# Patient Record
Sex: MALE | Race: WHITE | Employment: OTHER | URBAN - METROPOLITAN AREA
[De-identification: names, ages, dates, MRNs, and addresses within clinical notes are randomized per-mention and may not be internally consistent; named-entity substitution may affect disease eponyms.]

---

## 2017-02-10 PROBLEM — E23.6 PITUITARY MASS (HCC): Status: ACTIVE | Noted: 2017-02-10

## 2017-02-10 PROBLEM — I47.20 VENTRICULAR TACHYCARDIA (HCC): Status: ACTIVE | Noted: 2017-02-10

## 2017-02-10 PROBLEM — R51.9 GENERALIZED HEADACHES: Status: ACTIVE | Noted: 2017-02-10

## 2017-02-10 PROBLEM — R26.0 ATAXIC GAIT: Status: ACTIVE | Noted: 2017-02-10

## 2017-02-10 PROBLEM — I10 ACCELERATED HYPERTENSION: Status: ACTIVE | Noted: 2017-02-10

## 2017-02-10 PROBLEM — R00.0 WIDE-COMPLEX TACHYCARDIA: Status: ACTIVE | Noted: 2017-02-10

## 2017-02-10 PROBLEM — E22.0 ACROMEGALY (HCC): Status: ACTIVE | Noted: 2017-02-10

## 2017-02-10 PROBLEM — R55 SYNCOPE AND COLLAPSE: Status: ACTIVE | Noted: 2017-02-10

## 2017-02-10 PROBLEM — E23.6 PITUITARY MASS (HCC): Chronic | Status: ACTIVE | Noted: 2017-02-10

## 2017-03-24 ENCOUNTER — OFFICE VISIT (OUTPATIENT)
Dept: FAMILY MEDICINE CLINIC | Age: 77
End: 2017-03-24

## 2017-03-24 VITALS
HEART RATE: 69 BPM | DIASTOLIC BLOOD PRESSURE: 62 MMHG | WEIGHT: 285.6 LBS | RESPIRATION RATE: 14 BRPM | BODY MASS INDEX: 42.3 KG/M2 | OXYGEN SATURATION: 98 % | SYSTOLIC BLOOD PRESSURE: 116 MMHG | HEIGHT: 69 IN | TEMPERATURE: 98 F

## 2017-03-24 DIAGNOSIS — E78.2 MIXED HYPERLIPIDEMIA: ICD-10-CM

## 2017-03-24 DIAGNOSIS — I10 ESSENTIAL HYPERTENSION: Primary | ICD-10-CM

## 2017-03-24 DIAGNOSIS — E23.6 PITUITARY MASS (HCC): Chronic | ICD-10-CM

## 2017-03-24 DIAGNOSIS — Z51.81 MEDICATION MONITORING ENCOUNTER: ICD-10-CM

## 2017-03-24 DIAGNOSIS — J20.9 ACUTE TRACHEOBRONCHITIS: ICD-10-CM

## 2017-03-24 DIAGNOSIS — I47.20 VENTRICULAR TACHYARRHYTHMIA: ICD-10-CM

## 2017-03-24 DIAGNOSIS — I25.83 CORONARY ARTERY DISEASE DUE TO LIPID RICH PLAQUE: ICD-10-CM

## 2017-03-24 DIAGNOSIS — E22.0 ACROMEGALY (HCC): ICD-10-CM

## 2017-03-24 DIAGNOSIS — I25.10 CORONARY ARTERY DISEASE DUE TO LIPID RICH PLAQUE: ICD-10-CM

## 2017-03-24 DIAGNOSIS — E66.01 MORBID OBESITY WITH BMI OF 40.0-44.9, ADULT (HCC): ICD-10-CM

## 2017-03-24 DIAGNOSIS — D35.2 PITUITARY MACROADENOMA (HCC): ICD-10-CM

## 2017-03-24 PROCEDURE — G8510 SCR DEP NEG, NO PLAN REQD: HCPCS | Performed by: FAMILY MEDICINE

## 2017-03-24 PROCEDURE — 99214 OFFICE O/P EST MOD 30 MIN: CPT | Performed by: FAMILY MEDICINE

## 2017-03-24 PROCEDURE — 3288F FALL RISK ASSESSMENT DOCD: CPT | Performed by: FAMILY MEDICINE

## 2017-03-24 RX ORDER — LISINOPRIL 20 MG/1
TABLET ORAL
Qty: 180 TABLET | Refills: 3 | Status: SHIPPED | OUTPATIENT
Start: 2017-03-24 | End: 2017-04-12 | Stop reason: SDUPTHER

## 2017-03-24 RX ORDER — METHYLPREDNISOLONE 4 MG/1
TABLET ORAL
Qty: 1 KIT | Refills: 1 | Status: SHIPPED | OUTPATIENT
Start: 2017-03-24 | End: 2017-03-30

## 2017-03-24 RX ORDER — BENZONATATE 200 MG/1
200 CAPSULE ORAL 3 TIMES DAILY PRN
Qty: 30 CAPSULE | Refills: 3 | Status: SHIPPED | OUTPATIENT
Start: 2017-03-24 | End: 2018-06-04 | Stop reason: ALTCHOICE

## 2017-03-24 RX ORDER — METOPROLOL TARTRATE 50 MG/1
TABLET, FILM COATED ORAL
Qty: 180 TABLET | Refills: 3 | Status: SHIPPED | OUTPATIENT
Start: 2017-03-24 | End: 2017-04-12 | Stop reason: SDUPTHER

## 2017-03-24 RX ORDER — GUAIFENESIN 400 MG/1
400 TABLET ORAL 4 TIMES DAILY PRN
COMMUNITY
End: 2018-06-04 | Stop reason: ALTCHOICE

## 2017-03-24 ASSESSMENT — ENCOUNTER SYMPTOMS
COUGH: 1
GASTROINTESTINAL NEGATIVE: 1
CHOKING: 0
ALLERGIC/IMMUNOLOGIC NEGATIVE: 1
EYES NEGATIVE: 1
WHEEZING: 0
RHINORRHEA: 1
SHORTNESS OF BREATH: 0

## 2017-03-24 ASSESSMENT — PATIENT HEALTH QUESTIONNAIRE - PHQ9
SUM OF ALL RESPONSES TO PHQ9 QUESTIONS 1 & 2: 0
SUM OF ALL RESPONSES TO PHQ QUESTIONS 1-9: 0
1. LITTLE INTEREST OR PLEASURE IN DOING THINGS: 0
2. FEELING DOWN, DEPRESSED OR HOPELESS: 0

## 2017-03-29 LAB
CHOLESTEROL, TOTAL: 198 MG/DL
CHOLESTEROL/HDL RATIO: NORMAL
HDLC SERPL-MCNC: 45 MG/DL (ref 35–70)
LDL CHOLESTEROL CALCULATED: 124 MG/DL (ref 0–160)
TRIGL SERPL-MCNC: 143 MG/DL
VLDLC SERPL CALC-MCNC: 29 MG/DL

## 2017-04-12 DIAGNOSIS — I10 ESSENTIAL HYPERTENSION: ICD-10-CM

## 2017-04-13 RX ORDER — LISINOPRIL 20 MG/1
TABLET ORAL
Qty: 180 TABLET | Refills: 3 | Status: SHIPPED | OUTPATIENT
Start: 2017-04-13 | End: 2018-01-26 | Stop reason: SDUPTHER

## 2017-04-13 RX ORDER — METOPROLOL TARTRATE 50 MG/1
TABLET, FILM COATED ORAL
Qty: 180 TABLET | Refills: 3 | Status: SHIPPED | OUTPATIENT
Start: 2017-04-13 | End: 2018-01-26 | Stop reason: SDUPTHER

## 2018-01-26 DIAGNOSIS — I10 ESSENTIAL HYPERTENSION: ICD-10-CM

## 2018-01-29 RX ORDER — LISINOPRIL 20 MG/1
TABLET ORAL
Qty: 180 TABLET | Refills: 3 | Status: SHIPPED | OUTPATIENT
Start: 2018-01-29 | End: 2018-12-03 | Stop reason: SDUPTHER

## 2018-01-29 RX ORDER — METOPROLOL TARTRATE 50 MG/1
TABLET, FILM COATED ORAL
Qty: 180 TABLET | Refills: 3 | Status: SHIPPED | OUTPATIENT
Start: 2018-01-29 | End: 2018-12-03 | Stop reason: SDUPTHER

## 2018-02-19 ENCOUNTER — TELEPHONE (OUTPATIENT)
Dept: FAMILY MEDICINE CLINIC | Age: 78
End: 2018-02-19

## 2018-02-19 RX ORDER — OSELTAMIVIR PHOSPHATE 75 MG/1
75 CAPSULE ORAL 2 TIMES DAILY
Qty: 10 CAPSULE | Refills: 0 | Status: SHIPPED | OUTPATIENT
Start: 2018-02-19 | End: 2018-02-24

## 2018-06-04 ENCOUNTER — OFFICE VISIT (OUTPATIENT)
Dept: FAMILY MEDICINE CLINIC | Age: 78
End: 2018-06-04
Payer: MEDICARE

## 2018-06-04 VITALS
WEIGHT: 289.4 LBS | SYSTOLIC BLOOD PRESSURE: 122 MMHG | HEART RATE: 76 BPM | HEIGHT: 70 IN | BODY MASS INDEX: 41.43 KG/M2 | DIASTOLIC BLOOD PRESSURE: 76 MMHG | RESPIRATION RATE: 16 BRPM

## 2018-06-04 DIAGNOSIS — Z00.00 ROUTINE GENERAL MEDICAL EXAMINATION AT A HEALTH CARE FACILITY: Primary | ICD-10-CM

## 2018-06-04 PROCEDURE — G0438 PPPS, INITIAL VISIT: HCPCS | Performed by: FAMILY MEDICINE

## 2018-06-04 PROCEDURE — 4040F PNEUMOC VAC/ADMIN/RCVD: CPT | Performed by: FAMILY MEDICINE

## 2018-06-04 PROCEDURE — G8598 ASA/ANTIPLAT THER USED: HCPCS | Performed by: FAMILY MEDICINE

## 2018-06-04 ASSESSMENT — LIFESTYLE VARIABLES: HOW OFTEN DO YOU HAVE A DRINK CONTAINING ALCOHOL: 0

## 2018-06-04 ASSESSMENT — ANXIETY QUESTIONNAIRES: GAD7 TOTAL SCORE: 0

## 2018-06-04 ASSESSMENT — PATIENT HEALTH QUESTIONNAIRE - PHQ9: SUM OF ALL RESPONSES TO PHQ QUESTIONS 1-9: 0

## 2018-06-05 ENCOUNTER — OFFICE VISIT (OUTPATIENT)
Dept: FAMILY MEDICINE CLINIC | Age: 78
End: 2018-06-05
Payer: MEDICARE

## 2018-06-05 VITALS
BODY MASS INDEX: 41.37 KG/M2 | HEIGHT: 70 IN | OXYGEN SATURATION: 96 % | RESPIRATION RATE: 14 BRPM | HEART RATE: 64 BPM | SYSTOLIC BLOOD PRESSURE: 110 MMHG | DIASTOLIC BLOOD PRESSURE: 76 MMHG | WEIGHT: 289 LBS

## 2018-06-05 DIAGNOSIS — N50.812 TESTICULAR PAIN, LEFT: Primary | ICD-10-CM

## 2018-06-05 PROCEDURE — G8598 ASA/ANTIPLAT THER USED: HCPCS | Performed by: FAMILY MEDICINE

## 2018-06-05 PROCEDURE — G8427 DOCREV CUR MEDS BY ELIG CLIN: HCPCS | Performed by: FAMILY MEDICINE

## 2018-06-05 PROCEDURE — 4040F PNEUMOC VAC/ADMIN/RCVD: CPT | Performed by: FAMILY MEDICINE

## 2018-06-05 PROCEDURE — G8417 CALC BMI ABV UP PARAM F/U: HCPCS | Performed by: FAMILY MEDICINE

## 2018-06-05 PROCEDURE — 99213 OFFICE O/P EST LOW 20 MIN: CPT | Performed by: FAMILY MEDICINE

## 2018-06-05 PROCEDURE — 1123F ACP DISCUSS/DSCN MKR DOCD: CPT | Performed by: FAMILY MEDICINE

## 2018-06-05 PROCEDURE — 1036F TOBACCO NON-USER: CPT | Performed by: FAMILY MEDICINE

## 2018-12-03 DIAGNOSIS — I10 ESSENTIAL HYPERTENSION: ICD-10-CM

## 2018-12-04 RX ORDER — METOPROLOL TARTRATE 50 MG/1
TABLET, FILM COATED ORAL
Qty: 180 TABLET | Refills: 3 | Status: SHIPPED | OUTPATIENT
Start: 2018-12-04 | End: 2019-08-13 | Stop reason: SDUPTHER

## 2018-12-04 RX ORDER — LISINOPRIL 20 MG/1
TABLET ORAL
Qty: 180 TABLET | Refills: 3 | Status: SHIPPED | OUTPATIENT
Start: 2018-12-04 | End: 2019-08-13 | Stop reason: SDUPTHER

## 2019-02-07 ENCOUNTER — OFFICE VISIT (OUTPATIENT)
Dept: FAMILY MEDICINE CLINIC | Age: 79
End: 2019-02-07
Payer: MEDICARE

## 2019-02-07 VITALS
HEIGHT: 69 IN | BODY MASS INDEX: 43.22 KG/M2 | DIASTOLIC BLOOD PRESSURE: 84 MMHG | SYSTOLIC BLOOD PRESSURE: 136 MMHG | RESPIRATION RATE: 16 BRPM | HEART RATE: 64 BPM | WEIGHT: 291.8 LBS

## 2019-02-07 DIAGNOSIS — E22.0 ACROMEGALY (HCC): ICD-10-CM

## 2019-02-07 DIAGNOSIS — R42 EPISODIC LIGHTHEADEDNESS: ICD-10-CM

## 2019-02-07 DIAGNOSIS — I25.10 CORONARY ARTERY DISEASE DUE TO LIPID RICH PLAQUE: ICD-10-CM

## 2019-02-07 DIAGNOSIS — I25.83 CORONARY ARTERY DISEASE DUE TO LIPID RICH PLAQUE: ICD-10-CM

## 2019-02-07 DIAGNOSIS — I10 ESSENTIAL HYPERTENSION: Primary | ICD-10-CM

## 2019-02-07 DIAGNOSIS — M54.2 CERVICAL MUSCLE PAIN: ICD-10-CM

## 2019-02-07 DIAGNOSIS — E78.2 MIXED HYPERLIPIDEMIA: ICD-10-CM

## 2019-02-07 PROCEDURE — 99214 OFFICE O/P EST MOD 30 MIN: CPT | Performed by: FAMILY MEDICINE

## 2019-02-07 PROCEDURE — G8427 DOCREV CUR MEDS BY ELIG CLIN: HCPCS | Performed by: FAMILY MEDICINE

## 2019-02-07 PROCEDURE — G8598 ASA/ANTIPLAT THER USED: HCPCS | Performed by: FAMILY MEDICINE

## 2019-02-07 PROCEDURE — 1101F PT FALLS ASSESS-DOCD LE1/YR: CPT | Performed by: FAMILY MEDICINE

## 2019-02-07 PROCEDURE — 1036F TOBACCO NON-USER: CPT | Performed by: FAMILY MEDICINE

## 2019-02-07 PROCEDURE — 4040F PNEUMOC VAC/ADMIN/RCVD: CPT | Performed by: FAMILY MEDICINE

## 2019-02-07 PROCEDURE — G8417 CALC BMI ABV UP PARAM F/U: HCPCS | Performed by: FAMILY MEDICINE

## 2019-02-07 PROCEDURE — G8484 FLU IMMUNIZE NO ADMIN: HCPCS | Performed by: FAMILY MEDICINE

## 2019-02-07 PROCEDURE — 1123F ACP DISCUSS/DSCN MKR DOCD: CPT | Performed by: FAMILY MEDICINE

## 2019-02-07 ASSESSMENT — PATIENT HEALTH QUESTIONNAIRE - PHQ9
SUM OF ALL RESPONSES TO PHQ9 QUESTIONS 1 & 2: 0
2. FEELING DOWN, DEPRESSED OR HOPELESS: 0
1. LITTLE INTEREST OR PLEASURE IN DOING THINGS: 0
SUM OF ALL RESPONSES TO PHQ QUESTIONS 1-9: 0
SUM OF ALL RESPONSES TO PHQ QUESTIONS 1-9: 0

## 2019-02-07 ASSESSMENT — ENCOUNTER SYMPTOMS
ABDOMINAL PAIN: 0
RESPIRATORY NEGATIVE: 1
ALLERGIC/IMMUNOLOGIC NEGATIVE: 1
COUGH: 0
SHORTNESS OF BREATH: 0
NAUSEA: 0
GASTROINTESTINAL NEGATIVE: 1
PHOTOPHOBIA: 0

## 2019-08-13 ENCOUNTER — OFFICE VISIT (OUTPATIENT)
Dept: FAMILY MEDICINE CLINIC | Age: 79
End: 2019-08-13
Payer: MEDICARE

## 2019-08-13 VITALS
RESPIRATION RATE: 20 BRPM | DIASTOLIC BLOOD PRESSURE: 78 MMHG | HEART RATE: 64 BPM | WEIGHT: 291.6 LBS | BODY MASS INDEX: 41.75 KG/M2 | HEIGHT: 70 IN | SYSTOLIC BLOOD PRESSURE: 134 MMHG

## 2019-08-13 DIAGNOSIS — Z00.00 ROUTINE GENERAL MEDICAL EXAMINATION AT A HEALTH CARE FACILITY: Primary | ICD-10-CM

## 2019-08-13 DIAGNOSIS — I10 ESSENTIAL HYPERTENSION: ICD-10-CM

## 2019-08-13 PROCEDURE — 4040F PNEUMOC VAC/ADMIN/RCVD: CPT | Performed by: FAMILY MEDICINE

## 2019-08-13 PROCEDURE — 1123F ACP DISCUSS/DSCN MKR DOCD: CPT | Performed by: FAMILY MEDICINE

## 2019-08-13 PROCEDURE — G0439 PPPS, SUBSEQ VISIT: HCPCS | Performed by: FAMILY MEDICINE

## 2019-08-13 PROCEDURE — G8598 ASA/ANTIPLAT THER USED: HCPCS | Performed by: FAMILY MEDICINE

## 2019-08-13 RX ORDER — METOPROLOL TARTRATE 50 MG/1
50 TABLET, FILM COATED ORAL 2 TIMES DAILY
Qty: 180 TABLET | Refills: 3 | Status: SHIPPED | OUTPATIENT
Start: 2019-08-13 | End: 2020-02-13 | Stop reason: SDUPTHER

## 2019-08-13 RX ORDER — LISINOPRIL 20 MG/1
20 TABLET ORAL 2 TIMES DAILY
Qty: 180 TABLET | Refills: 3 | Status: SHIPPED | OUTPATIENT
Start: 2019-08-13 | End: 2020-02-13 | Stop reason: SDUPTHER

## 2019-08-13 RX ORDER — IBUPROFEN 200 MG
200 TABLET ORAL EVERY 6 HOURS PRN
Status: ON HOLD | COMMUNITY
End: 2021-10-19 | Stop reason: HOSPADM

## 2019-08-13 ASSESSMENT — PATIENT HEALTH QUESTIONNAIRE - PHQ9
SUM OF ALL RESPONSES TO PHQ QUESTIONS 1-9: 0
SUM OF ALL RESPONSES TO PHQ QUESTIONS 1-9: 0

## 2019-08-13 ASSESSMENT — LIFESTYLE VARIABLES: HOW OFTEN DO YOU HAVE A DRINK CONTAINING ALCOHOL: 0

## 2019-08-13 NOTE — PROGRESS NOTES
History of blood transfusion     Hyperlipidemia 2005    Hypertension 2008    Other disorders of kidney and ureter in diseases classified elsewhere     Pituitary macroadenoma ([facility] [address] 2009    Zoster 2009     Past Surgical History:   Procedure Laterality Date    ABDOMEN SURGERY  unsure    CARDIAC CATHETERIZATION      CHOLECYSTECTOMY      COLONOSCOPY      DENTAL SURGERY      at age 23 yr    HERNIA REPAIR  2003    right inguinal    TONSILLECTOMY  1946    TONSILLECTOMY AND ADENOIDECTOMY      TOOTH EXTRACTION  unsure     Family History   Problem Relation Age of Onset    Heart Disease Father     Hearing Loss Father     High Blood Pressure Father     Arthritis Father     Arthritis Mother     Cancer Mother     High Blood Pressure Mother     Vision Loss Mother     High Blood Pressure Brother     Other Brother         bilateral knee repacement    Stroke Brother        CareTeam (Including outside providers/suppliers regularly involved in providing care):   Patient Care Team:  Suzy Ontiveros MD as PCP - General (Family Medicine)  Suzy Ontiveros MD as PCP - [facility] Empaneled Provider    Wt Readings from Last 3 Encounters:   08/13/19 291 lb 9.6 oz (132.3 kg)   02/07/19 291 lb 12.8 oz (132.4 kg)   06/05/18 289 lb (131.1 kg)     Vitals:    08/13/19 1446   BP: 134/78   Site: Right Upper Arm   Position: Sitting   Cuff Size: Large Adult   Pulse: 64   Resp: 20   Weight: 291 lb 9.6 oz (132.3 kg)   Height: 5' 9.75\" (1.772 m)     Body mass index is 42.14 kg/m². Based upon direct observation of the patient, evaluation of cognition reveals recent and remote memory intact.     General Appearance: alert and oriented to person, place and time, well developed and well- nourished, in no acute distress  Skin: warm and dry, no rash or erythema  Head: normocephalic and atraumatic  ENT: tympanic membrane, external ear and ear canal normal bilaterally, nose without deformity, nasal mucosa and turbinates normal without polyps  Neck:

## 2019-08-13 NOTE — PATIENT INSTRUCTIONS
swimming. · Always wear a seat belt when traveling in a car. Always wear a helmet when riding a bicycle or motorcycle.

## 2020-02-13 ENCOUNTER — OFFICE VISIT (OUTPATIENT)
Dept: FAMILY MEDICINE CLINIC | Age: 80
End: 2020-02-13
Payer: MEDICARE

## 2020-02-13 VITALS
SYSTOLIC BLOOD PRESSURE: 122 MMHG | DIASTOLIC BLOOD PRESSURE: 74 MMHG | BODY MASS INDEX: 41.58 KG/M2 | RESPIRATION RATE: 16 BRPM | HEART RATE: 76 BPM | WEIGHT: 287.7 LBS

## 2020-02-13 PROCEDURE — 99214 OFFICE O/P EST MOD 30 MIN: CPT | Performed by: FAMILY MEDICINE

## 2020-02-13 RX ORDER — METOPROLOL TARTRATE 50 MG/1
50 TABLET, FILM COATED ORAL 2 TIMES DAILY
Qty: 180 TABLET | Refills: 3 | Status: SHIPPED | OUTPATIENT
Start: 2020-02-13 | End: 2020-11-12 | Stop reason: SDUPTHER

## 2020-02-13 RX ORDER — LISINOPRIL 20 MG/1
20 TABLET ORAL 2 TIMES DAILY
Qty: 180 TABLET | Refills: 3 | Status: SHIPPED | OUTPATIENT
Start: 2020-02-13 | End: 2020-11-12 | Stop reason: SDUPTHER

## 2020-02-13 RX ORDER — ERYTHROMYCIN 5 MG/G
OINTMENT OPHTHALMIC
Qty: 1 TUBE | Refills: 0 | Status: SHIPPED | OUTPATIENT
Start: 2020-02-13 | End: 2020-02-23

## 2020-02-13 SDOH — ECONOMIC STABILITY: INCOME INSECURITY: HOW HARD IS IT FOR YOU TO PAY FOR THE VERY BASICS LIKE FOOD, HOUSING, MEDICAL CARE, AND HEATING?: NOT HARD AT ALL

## 2020-02-13 SDOH — ECONOMIC STABILITY: FOOD INSECURITY: WITHIN THE PAST 12 MONTHS, YOU WORRIED THAT YOUR FOOD WOULD RUN OUT BEFORE YOU GOT MONEY TO BUY MORE.: NEVER TRUE

## 2020-02-13 SDOH — ECONOMIC STABILITY: TRANSPORTATION INSECURITY
IN THE PAST 12 MONTHS, HAS THE LACK OF TRANSPORTATION KEPT YOU FROM MEDICAL APPOINTMENTS OR FROM GETTING MEDICATIONS?: NO

## 2020-02-13 SDOH — ECONOMIC STABILITY: TRANSPORTATION INSECURITY
IN THE PAST 12 MONTHS, HAS LACK OF TRANSPORTATION KEPT YOU FROM MEETINGS, WORK, OR FROM GETTING THINGS NEEDED FOR DAILY LIVING?: NO

## 2020-02-13 SDOH — ECONOMIC STABILITY: FOOD INSECURITY: WITHIN THE PAST 12 MONTHS, THE FOOD YOU BOUGHT JUST DIDN'T LAST AND YOU DIDN'T HAVE MONEY TO GET MORE.: NEVER TRUE

## 2020-02-13 ASSESSMENT — ENCOUNTER SYMPTOMS
BLOOD IN STOOL: 0
RESPIRATORY NEGATIVE: 1
EYE ITCHING: 1
NAUSEA: 0
EYE REDNESS: 1
DIARRHEA: 0
ABDOMINAL DISTENTION: 0
ALLERGIC/IMMUNOLOGIC NEGATIVE: 1
EYE PAIN: 0
GASTROINTESTINAL NEGATIVE: 1
ANAL BLEEDING: 0
CONSTIPATION: 0
RECTAL PAIN: 0
ABDOMINAL PAIN: 0
VOMITING: 0
EYE DISCHARGE: 0

## 2020-02-13 ASSESSMENT — PATIENT HEALTH QUESTIONNAIRE - PHQ9
SUM OF ALL RESPONSES TO PHQ QUESTIONS 1-9: 0
1. LITTLE INTEREST OR PLEASURE IN DOING THINGS: 0
SUM OF ALL RESPONSES TO PHQ9 QUESTIONS 1 & 2: 0
2. FEELING DOWN, DEPRESSED OR HOPELESS: 0
SUM OF ALL RESPONSES TO PHQ QUESTIONS 1-9: 0

## 2020-02-13 NOTE — PROGRESS NOTES
Subjective:      Patient ID: Teddy Raygoza is a [de-identified] y.o. male. HPI  Follow up of chronic conditions. Encounter Diagnoses   Name Primary? 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  Coronary artery disease due to lipid rich plaque     Pituitary macroadenoma, 2009     Routine general medical examination at a health care facility     Medication monitoring encounter     Urinary hesitancy     Hordeolum externum of right lower eyelid      HTN is stable with current medications. Pt has no medication side effects nor orthostatic symptoms. BP Readings from Last 3 Encounters:   02/13/20 122/74   08/13/19 134/78   02/07/19 136/84     Patient has had some mild urinary hesitation but feels like it is not severe enough to warrant any attention currently. His acromegaly secondary to his pituitary adenoma is unchanged. He is not actively following with endocrinology at this time although he has been evaluated at [facility] in the past.    His coronary artery disease remained stable. He denies having exertional chest pain or shortness of breath. He also has had no PND or nocturnal dyspnea. About a week and a half ago, a stye formed on his right lower eyelid and he started spraying the area with colloidal silver which helped decrease the swelling and tenderness. It has not totally resolved and is here for further evaluation also. The rest of this patient's conditions are stable. Past medical and surgical hx reviewed.   Past Medical History:   Diagnosis Date    Adenomatous colon polyp 2011    Blood transfusion reaction     Erectile dysfunction     History of blood transfusion     Hyperlipidemia 2005    Hypertension 2008    Other disorders of kidney and ureter in diseases classified elsewhere     Pituitary macroadenoma ([address] 2009    Zoster 2009     Past Surgical History:   Procedure Laterality Date    ABDOMEN SURGERY  unsure    CARDIAC CATHETERIZATION      CHOLECYSTECTOMY      COLONOSCOPY      DENTAL SURGERY      at age 23 yr   [address] [facility]  2003    right inguinal    TONSILLECTOMY  1946    TONSILLECTOMY AND ADENOIDECTOMY      TOOTH EXTRACTION  unsure     Portions of this note were completed with a voice recording program.  Efforts were made to edit the dictations but occasionally words are mis-transcribed. Review of Systems   Constitutional: Negative. HENT: Negative. Eyes: Positive for redness and itching. Negative for pain and discharge. Respiratory: Negative. Cardiovascular: Negative. Negative for palpitations and leg swelling. Gastrointestinal: Negative. Negative for abdominal distention, abdominal pain, anal bleeding, blood in stool, constipation, diarrhea, nausea, rectal pain and vomiting. Endocrine: Positive for polyuria. Negative for polydipsia and polyphagia. Genitourinary: Positive for difficulty urinating. Musculoskeletal: Positive for arthralgias. Skin: Negative. Allergic/Immunologic: Negative. Neurological: Negative. Hematological: Negative. Psychiatric/Behavioral: Negative. All other systems reviewed and are negative. Objective:   Physical Exam  Vitals signs and nursing note reviewed. Constitutional:       Appearance: He is obese. HENT:      Head: Normocephalic. Right Ear: Tympanic membrane, ear canal and external ear normal.      Left Ear: Tympanic membrane, ear canal and external ear normal.      Mouth/Throat:      Pharynx: Oropharynx is clear. Eyes:      Pupils: Pupils are equal, round, and reactive to light. Neck:      Musculoskeletal: No muscular tenderness. Vascular: No carotid bruit. Cardiovascular:      Rate and Rhythm: Normal rate and regular rhythm. Pulses: Normal pulses. Heart sounds: Normal heart sounds. Pulmonary:      Effort: Pulmonary effort is normal.      Breath sounds: Normal breath sounds.    Abdominal:      General: Bowel sounds are normal.      Palpations: There is no mass. Hernia: No hernia is present. Musculoskeletal:         General: No swelling or tenderness. Left lower leg: No edema. Lymphadenopathy:      Cervical: No cervical adenopathy. Skin:     General: Skin is warm and dry. Capillary Refill: Capillary refill takes less than 2 seconds. Neurological:      General: No focal deficit present. Mental Status: He is alert and oriented to person, place, and time. Psychiatric:         Mood and Affect: Mood normal.         Assessment:       Diagnosis Orders   1. Mixed hyperlipidemia  Lipid, Fasting   2. Essential hypertension  metoprolol tartrate (LOPRESSOR) 50 MG tablet    lisinopril (PRINIVIL;ZESTRIL) 20 MG tablet    CBC With Auto Differential    Comprehensive Metabolic Panel, Fasting    Lipid, Fasting    Urinalysis   3. 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 4. Coronary artery disease due to lipid rich plaque     5. Pituitary macroadenoma, 2009     6. Routine general medical examination at a health care facility     7. Medication monitoring encounter  CBC With Auto Differential    Comprehensive Metabolic Panel, Fasting    Lipid, Fasting    Urinalysis    PSA Prostatic Specific Antigen   8. Urinary hesitancy  Urinalysis    PSA Prostatic Specific Antigen   9.  Hordeolum externum of right lower eyelid  erythromycin (ROMYCIN) 5 MG/GM ophthalmic ointment           Plan:      Orders Placed This Encounter   Procedures    CBC With Auto Differential     Standing Status:   Future     Standing Expiration Date:   2/13/2021    Comprehensive Metabolic Panel, Fasting     Standing Status:   Future     Standing Expiration Date:   2/13/2021    Lipid, Fasting     Standing Status:   Future     Standing Expiration Date:   2/13/2021    Urinalysis     Standing Status:   Future     Standing Expiration Date:   2/13/2021   Kim Mendieta PSA Prostatic Specific Antigen     Standing Status:   Future     Standing Expiration Date: 2/13/2021     Medications Discontinued During This Encounter   Medication Reason    metoprolol tartrate (LOPRESSOR) 50 MG tablet REORDER    lisinopril (PRINIVIL;ZESTRIL) 20 MG tablet REORDER     Current Outpatient Medications   Medication Sig Dispense Refill    metoprolol tartrate (LOPRESSOR) 50 MG tablet Take 1 tablet by mouth 2 times daily 180 tablet 3    lisinopril (PRINIVIL;ZESTRIL) 20 MG tablet Take 1 tablet by mouth 2 times daily 180 tablet 3    erythromycin (ROMYCIN) 5 MG/GM ophthalmic ointment Apply qid to OD until clear. 1 Tube 0    Misc Natural Products (GLUCOSAMINE CHONDROITIN MSM PO) Take by mouth      ibuprofen (ADVIL;MOTRIN) 200 MG tablet Take 200 mg by mouth every 6 hours as needed for Pain      docusate sodium (COLACE) 100 MG capsule Take 100 mg by mouth every evening       multivitamin (THERAGRAN) per tablet Take 1 tablet by mouth daily       Lysine 500 MG CAPS Take 500 mg by mouth. 2 daily         Arginine 500 MG CAPS Take 1,000 mg by mouth daily       Olive Leaf 250 MG CAPS Take 250 mg by mouth Once or twice daily      B Complex Vitamins (VITAMIN B COMPLEX PO) Take  by mouth. Twice weekly        No current facility-administered medications for this visit. Continue presents medications. Continue present level of physical activity. Discussed use, benefit, and side effects of prescribed medications. Barriers to compliance discussed. All patient questions answered. Pt voiced understanding.           George Maldonado MD

## 2020-02-13 NOTE — PATIENT INSTRUCTIONS
You may receive a survey about your visit with us today. The feedback from our patients helps us identify what is working well and where the service to all patients can be enhanced. Thank you! Continue presents medications. Continue present level of physical activity. Patient Education        Styes and Chalazia: Care Instructions  Your Care Instructions    Styes and chalazia (say \"lat-PZM-ccv-anmol\") are both conditions that can cause swelling of the eyelid. A stye is an infection in the root of an eyelash. The infection causes a tender red lump on the edge of the eyelid. The infection can spread until the whole eyelid becomes red and inflamed. Styes usually break open, and a tiny amount of pus drains. They usually clear up on their own in about a week, but they sometimes need treatment with antibiotics. A chalazion is a lump or cyst in the eyelid (chalazion is singular; chalazia is plural). It is caused by swelling and inflammation of deep oil glands inside the eyelid. Chalazia are usually not infected. They can take a few months to heal.  If a chalazion becomes more swollen and painful or does not go away, you may need to have it drained by your doctor. Follow-up care is a key part of your treatment and safety. Be sure to make and go to all appointments, and call your doctor if you are having problems. It's also a good idea to know your test results and keep a list of the medicines you take. How can you care for yourself at home? · Do not rub your eyes. Do not squeeze or try to open a stye or chalazion. · To help a stye or chalazion heal faster:  ? Put a warm, moist compress on your eye for 5 to 10 minutes, 3 to 6 times a day. Heat often brings a stye to a point where it drains on its own. Keep in mind that warm compresses will often increase swelling a little at first.  ? Do not use hot water or heat a wet cloth in a microwave oven. The compress may get too hot and can burn the eyelid.   · Always wash your hands before and after you use a compress or touch your eyes. · If the doctor gave you antibiotic drops or ointment, use the medicine exactly as directed. Use the medicine for as long as instructed, even if your eye starts to feel better. · To put in eyedrops or ointment:  ? Tilt your head back, and pull your lower eyelid down with one finger. ? Drop or squirt the medicine inside the lower lid. ? Close your eye for 30 to 60 seconds to let the drops or ointment move around. ? Do not touch the ointment or dropper tip to your eyelashes or any other surface. · Do not wear eye makeup or contact lenses until the stye or chalazion heals. · Do not share towels, pillows, or washcloths while you have a stye. When should you call for help? Call your doctor now or seek immediate medical care if:    · You have pain in your eye.     · You have a change in vision or loss of vision.     · Redness and swelling get much worse.    Watch closely for changes in your health, and be sure to contact your doctor if:    · Your stye does not get better in 1 week.     · Your chalazion does not start to get better after several weeks. Where can you learn more? Go to https://invendo medical.[employer]. org and sign in to your [employer] account. Enter F573 in the [facility] box to learn more about \"Styes and Chalazia: Care Instructions. \"     If you do not have an account, please click on the \"Sign Up Now\" link. Current as of: May 5, 2019  Content Version: 12.3  © 0784-9668 Healthwise, Incorporated. Care instructions adapted under license by [facility] ([facility]). If you have questions about a medical condition or this instruction, always ask your healthcare professional. [place] [address] any warranty or liability for your use of this information.          Patient Education        Styes and Chalazia: Care Instructions  Your Care Instructions    Styes and chalazia (say \"utx-XBN-tmx-uh\") are both conditions that can cause swelling of the eyelid. A stye is an infection in the root of an eyelash. The infection causes a tender red lump on the edge of the eyelid. The infection can spread until the whole eyelid becomes red and inflamed. Styes usually break open, and a tiny amount of pus drains. They usually clear up on their own in about a week, but they sometimes need treatment with antibiotics. A chalazion is a lump or cyst in the eyelid (chalazion is singular; chalazia is plural). It is caused by swelling and inflammation of deep oil glands inside the eyelid. Chalazia are usually not infected. They can take a few months to heal.  If a chalazion becomes more swollen and painful or does not go away, you may need to have it drained by your doctor. Follow-up care is a key part of your treatment and safety. Be sure to make and go to all appointments, and call your doctor if you are having problems. It's also a good idea to know your test results and keep a list of the medicines you take. How can you care for yourself at home? · Do not rub your eyes. Do not squeeze or try to open a stye or chalazion. · To help a stye or chalazion heal faster:  ? Put a warm, moist compress on your eye for 5 to 10 minutes, 3 to 6 times a day. Heat often brings a stye to a point where it drains on its own. Keep in mind that warm compresses will often increase swelling a little at first.  ? Do not use hot water or heat a wet cloth in a microwave oven. The compress may get too hot and can burn the eyelid. · Always wash your hands before and after you use a compress or touch your eyes. · If the doctor gave you antibiotic drops or ointment, use the medicine exactly as directed. Use the medicine for as long as instructed, even if your eye starts to feel better. · To put in eyedrops or ointment:  ? Tilt your head back, and pull your lower eyelid down with one finger. ? Drop or squirt the medicine inside the lower lid.   ? Close your eye for 30 to 60 seconds to let the drops or ointment move around. ? Do not touch the ointment or dropper tip to your eyelashes or any other surface. · Do not wear eye makeup or contact lenses until the stye or chalazion heals. · Do not share towels, pillows, or washcloths while you have a stye. When should you call for help? Call your doctor now or seek immediate medical care if:    · You have pain in your eye.     · You have a change in vision or loss of vision.     · Redness and swelling get much worse.    Watch closely for changes in your health, and be sure to contact your doctor if:    · Your stye does not get better in 1 week.     · Your chalazion does not start to get better after several weeks. Where can you learn more? Go to https://chpepiceweb.[employer]. org and sign in to your [employer] account. Enter W964 in the [employer] box to learn more about \"Styes and Chalazia: Care Instructions. \"     If you do not have an account, please click on the \"Sign Up Now\" link. Current as of: May 5, 2019  Content Version: 12.3  © 0663-6695 Healthwise, Incorporated. Care instructions adapted under license by [facility] ([facility]). If you have questions about a medical condition or this instruction, always ask your healthcare professional. [address] any warranty or liability for your use of this information.

## 2020-02-19 LAB
CHOLESTEROL, TOTAL: 205 MG/DL
CHOLESTEROL/HDL RATIO: NORMAL
HDLC SERPL-MCNC: 40 MG/DL (ref 35–70)
LDL CHOLESTEROL CALCULATED: 123 MG/DL (ref 0–160)
TRIGL SERPL-MCNC: 210 MG/DL
VLDLC SERPL CALC-MCNC: 42 MG/DL

## 2020-02-19 NOTE — RESULT ENCOUNTER NOTE
The labs came back looking good but his PSA level was 7.9 which is elevated but still less than 10.0. This may be all age-related change but in order to repeat a PSA in 6 months is on the printer and this will tell us if it is a stable reading or if there is a change that needs to be investigated.

## 2020-08-13 ENCOUNTER — OFFICE VISIT (OUTPATIENT)
Dept: FAMILY MEDICINE CLINIC | Age: 80
End: 2020-08-13
Payer: MEDICARE

## 2020-08-13 VITALS
HEART RATE: 68 BPM | BODY MASS INDEX: 41.42 KG/M2 | TEMPERATURE: 96.8 F | WEIGHT: 286.6 LBS | SYSTOLIC BLOOD PRESSURE: 124 MMHG | RESPIRATION RATE: 16 BRPM | DIASTOLIC BLOOD PRESSURE: 72 MMHG

## 2020-08-13 PROCEDURE — 99214 OFFICE O/P EST MOD 30 MIN: CPT | Performed by: FAMILY MEDICINE

## 2020-08-13 ASSESSMENT — ENCOUNTER SYMPTOMS
ALLERGIC/IMMUNOLOGIC NEGATIVE: 1
GASTROINTESTINAL NEGATIVE: 1
RESPIRATORY NEGATIVE: 1
SHORTNESS OF BREATH: 0

## 2020-08-13 NOTE — PROGRESS NOTES
SURGERY      at age 23 yr   [address] [facility]  2003    right inguinal    TONSILLECTOMY  1946    TONSILLECTOMY AND ADENOIDECTOMY      TOOTH EXTRACTION  unsure     Portions of this note were completed with a voice recording program.  Efforts were made to edit the dictations but occasionally words are mis-transcribed. Review of Systems   Constitutional: Negative. HENT: Negative. Respiratory: Negative. Negative for shortness of breath. Cardiovascular: Negative. Negative for chest pain, palpitations and leg swelling. Gastrointestinal: Negative. Endocrine: Negative. Genitourinary: Negative. Musculoskeletal: Positive for arthralgias. Allergic/Immunologic: Negative. Neurological: Negative. Negative for dizziness, light-headedness and headaches. Hematological: Negative. Psychiatric/Behavioral: Negative. All other systems reviewed and are negative. Objective:   Physical Exam  Vitals signs and nursing note reviewed. Constitutional:       Appearance: He is obese. HENT:      Right Ear: Tympanic membrane, ear canal and external ear normal.      Left Ear: Tympanic membrane, ear canal and external ear normal.      Mouth/Throat:      Mouth: Mucous membranes are moist.      Pharynx: Oropharynx is clear. Eyes:      Conjunctiva/sclera: Conjunctivae normal.      Pupils: Pupils are equal, round, and reactive to light. Neck:      Vascular: No carotid bruit. Cardiovascular:      Rate and Rhythm: Normal rate and regular rhythm. Pulses: Normal pulses. Heart sounds: Normal heart sounds. Pulmonary:      Effort: Pulmonary effort is normal.      Breath sounds: Normal breath sounds. Abdominal:      General: Bowel sounds are normal.   Musculoskeletal:      Right lower leg: No edema. Left lower leg: No edema. Skin:     General: Skin is warm and dry. Neurological:      General: No focal deficit present.       Mental Status: He is alert and oriented to person, place, and

## 2020-11-03 PROBLEM — R42 DIZZINESS: Status: RESOLVED | Noted: 2020-11-03 | Resolved: 2020-11-03

## 2020-11-12 ENCOUNTER — OFFICE VISIT (OUTPATIENT)
Dept: FAMILY MEDICINE CLINIC | Age: 80
End: 2020-11-12
Payer: MEDICARE

## 2020-11-12 VITALS
WEIGHT: 287 LBS | BODY MASS INDEX: 41.09 KG/M2 | DIASTOLIC BLOOD PRESSURE: 78 MMHG | SYSTOLIC BLOOD PRESSURE: 134 MMHG | RESPIRATION RATE: 16 BRPM | HEIGHT: 70 IN | HEART RATE: 86 BPM

## 2020-11-12 PROCEDURE — G0439 PPPS, SUBSEQ VISIT: HCPCS | Performed by: FAMILY MEDICINE

## 2020-11-12 RX ORDER — METOPROLOL TARTRATE 50 MG/1
50 TABLET, FILM COATED ORAL 2 TIMES DAILY
Qty: 180 TABLET | Refills: 3 | Status: SHIPPED | OUTPATIENT
Start: 2020-11-12 | End: 2021-12-28 | Stop reason: SDUPTHER

## 2020-11-12 RX ORDER — LISINOPRIL 20 MG/1
20 TABLET ORAL 2 TIMES DAILY
Qty: 180 TABLET | Refills: 3 | Status: SHIPPED | OUTPATIENT
Start: 2020-11-12 | End: 2021-12-28 | Stop reason: SDUPTHER

## 2020-11-12 ASSESSMENT — PATIENT HEALTH QUESTIONNAIRE - PHQ9
1. LITTLE INTEREST OR PLEASURE IN DOING THINGS: 0
SUM OF ALL RESPONSES TO PHQ QUESTIONS 1-9: 0
2. FEELING DOWN, DEPRESSED OR HOPELESS: 0
SUM OF ALL RESPONSES TO PHQ9 QUESTIONS 1 & 2: 0

## 2020-11-12 ASSESSMENT — LIFESTYLE VARIABLES: HOW OFTEN DO YOU HAVE A DRINK CONTAINING ALCOHOL: 0

## 2020-11-12 NOTE — PROGRESS NOTES
 Hyperlipidemia 2005    Hypertension 2008    Other disorders of kidney and ureter in diseases classified elsewhere     Pituitary macroadenoma ([facility] [address] 2009    Zoster 2009       Past Surgical History:   Procedure Laterality Date    ABDOMEN SURGERY  unsure    CARDIAC CATHETERIZATION      CHOLECYSTECTOMY      COLONOSCOPY      DENTAL SURGERY      at age 23 yr    HERNIA REPAIR  2003    right inguinal    TONSILLECTOMY  1946    TONSILLECTOMY AND ADENOIDECTOMY      TOOTH EXTRACTION  unsure         Family History   Problem Relation Age of Onset    Heart Disease Father     Hearing Loss Father     High Blood Pressure Father     Arthritis Father     Arthritis Mother     Cancer Mother     High Blood Pressure Mother     Vision Loss Mother     High Blood Pressure Brother     Other Brother         bilateral knee repacement    Stroke Brother        CareTeam (Including outside providers/suppliers regularly involved in providing care):   Patient Care Team:  Jenaro Marte MD as PCP - General (Family Medicine)  Jenaro Marte MD as PCP - [facility] Empaneled Provider    Wt Readings from Last 3 Encounters:   11/12/20 287 lb (130.2 kg)   08/13/20 286 lb 9.6 oz (130 kg)   02/13/20 287 lb 11.2 oz (130.5 kg)     Vitals:    11/12/20 1416   BP: 134/78   Pulse: 86   Resp: 16   Weight: 287 lb (130.2 kg)   Height: 5' 10\" (1.778 m)     Body mass index is 41.18 kg/m². Based upon direct observation of the patient, evaluation of cognition reveals recent and remote memory intact.     General Appearance: alert and oriented to person, place and time, well developed and well- nourished, in no acute distress  Skin: warm and dry, no rash or erythema  Head: normocephalic and atraumatic  Eyes: pupils equal, round, and reactive to light, extraocular eye movements intact, conjunctivae normal  ENT: tympanic membrane, external ear and ear canal normal bilaterally, nose without deformity, nasal mucosa and turbinates normal without polyps  Neck: supple and non-tender without mass, no thyromegaly or thyroid nodules, no cervical lymphadenopathy  Pulmonary/Chest: clear to auscultation bilaterally- no wheezes, rales or rhonchi, normal air movement, no respiratory distress  Cardiovascular: normal rate, regular rhythm, normal S1 and S2, no murmurs, rubs, clicks, or gallops, distal pulses intact, no carotid bruits  Abdomen: soft, non-tender, non-distended, normal bowel sounds, no masses or organomegaly  Extremities: no cyanosis, clubbing or edema  Musculoskeletal: normal range of motion, no joint swelling, deformity or tenderness  Neurologic: reflexes normal and symmetric, no cranial nerve deficit, gait, coordination and speech normal    Patient's complete Health Risk Assessment and screening values have been reviewed and are found in Flowsheets. The following problems were reviewed today and where indicated follow up appointments were made and/or referrals ordered. Positive Risk Factor Screenings with Interventions:     Health Habits/Nutrition:  Health Habits/Nutrition  Do you exercise for at least 20 minutes 2-3 times per week?: (!) No  Have you lost any weight without trying in the past 3 months?: No  Do you eat fewer than 2 meals per day?: No  Have you seen a dentist within the past year?: (!) No(dentures)  Body mass index: (!) 41.18  Health Habits/Nutrition Interventions:  · none. Hearing/Vision:  No exam data present  Hearing/Vision  Do you or your family notice any trouble with your hearing?: (!) Yes  Do you have difficulty driving, watching TV, or doing any of your daily activities because of your eyesight?: No  Have you had an eye exam within the past year?: Yes  Hearing/Vision Interventions:  · none.     Safety:  Safety  Do you have working smoke detectors?: Yes  Have all throw rugs been removed or fastened?: (!) No  Do you have non-slip mats or surfaces in all bathtubs/showers?: (!) No  Do all of your stairways have a railing or banister?: Yes  Are your doorways, halls and stairs free of clutter?: Yes  Do you always fasten your seatbelt when you are in a car?: Yes  Safety Interventions:  · none. Personalized Preventive Plan   Current Health Maintenance Status  There is no immunization history for the selected administration types on file for this patient. Health Maintenance   Topic Date Due    DTaP/Tdap/Td vaccine (1 - Tdap) 02/23/1958    Shingles Vaccine (1 of 2) 02/23/1989    Pneumococcal 65+ years Vaccine (1 of 1 - PPSV23) 02/23/2004    Potassium monitoring  02/09/2018    Creatinine monitoring  02/09/2018    Annual Wellness Visit (AWV)  02/13/2020    Flu vaccine (1) 11/12/2021 (Originally 9/1/2020)    Hepatitis A vaccine  Aged Out    Hepatitis B vaccine  Aged Out    Hib vaccine  Aged Out    Meningococcal (ACWY) vaccine  Aged Out     Recommendations for [employer] Due: see orders and patient instructions/AVS.  . Recommended screening schedule for the next 5-10 years is provided to the patient in written form: see Patient Instructions/AVS.    Fidel Perez was seen today for medicare awv. Diagnoses and all orders for this visit:    Encounter for subsequent annual wellness visit (AWV) in Medicare patient    Routine general medical examination at a health care facility    Essential hypertension  -     lisinopril (PRINIVIL;ZESTRIL) 20 MG tablet; Take 1 tablet by mouth 2 times daily  -     metoprolol tartrate (LOPRESSOR) 50 MG tablet; Take 1 tablet by mouth 2 times daily            Gabo received counseling on the following healthy behaviors: nutrition and exercise    Patient given educational materials on Hypertension    I have instructed Fidel Perez to complete a self tracking handout on Blood Sugars  and instructed them to bring it with them to his next appointment. Discussed use, benefit, and side effects of prescribed medications. Barriers to medication compliance addressed. All patient questions answered. Pt voiced understanding.

## 2020-11-12 NOTE — PATIENT INSTRUCTIONS
Personalized Preventive Plan for Michel Hernandez - 11/12/2020  Medicare offers a range of preventive health benefits. Some of the tests and screenings are paid in full while other may be subject to a deductible, co-insurance, and/or copay. Some of these benefits include a comprehensive review of your medical history including lifestyle, illnesses that may run in your family, and various assessments and screenings as appropriate. After reviewing your medical record and screening and assessments performed today your provider may have ordered immunizations, labs, imaging, and/or referrals for you. A list of these orders (if applicable) as well as your Preventive Care list are included within your After Visit Summary for your review. Other Preventive Recommendations:    · A preventive eye exam performed by an eye specialist is recommended every 1-2 years to screen for glaucoma; cataracts, macular degeneration, and other eye disorders. · A preventive dental visit is recommended every 6 months. · Try to get at least 150 minutes of exercise per week or 10,000 steps per day on a pedometer . · Order or download the FREE \"Exercise & Physical Activity: Your Everyday Guide\" from [employer]. Call 5-739.549.4115 or search [employer] online. · You need 5257-1540 mg of calcium and 2380-2786 IU of vitamin D per day. It is possible to meet your calcium requirement with diet alone, but a vitamin D supplement is usually necessary to meet this goal.  · When exposed to the sun, use a sunscreen that protects against both UVA and UVB radiation with an SPF of 30 or greater. Reapply every 2 to 3 hours or after sweating, drying off with a towel, or swimming. · Always wear a seat belt when traveling in a car. Always wear a helmet when riding a bicycle or motorcycle.

## 2020-11-12 NOTE — PROGRESS NOTES
Chronic Disease Visit Information    BP Readings from Last 3 Encounters:   08/13/20 124/72   02/13/20 122/74   08/13/19 134/78          LDL Calculated (mg/dL)   Date Value   02/19/2020 123     HDL (mg/dL)   Date Value   02/19/2020 40     BUN (mg/dl)   Date Value   02/09/2017 15     CREATININE (mg/dl)   Date Value   02/09/2017 0.8     Glucose (mg/dl)   Date Value   02/09/2017 93            Have you changed or started any medications since your last visit including any over-the-counter medicines, vitamins, or herbal medicines? no   Are you having any side effects from any of your medications? -  no  Have you stopped taking any of your medications? Is so, why? -  no    Have you seen any other physician or provider since your last visit? No  Have you had any other diagnostic tests since your last visit? No  Have you been seen in the emergency room and/or had an admission to a hospital since we last saw you? No  Have you had your annual diabetic retinal (eye) exam? Yes - Records Requested  Have you had your routine dental cleaning in the past 6 months? no    Have you activated your [employer] account? If not, what are your barriers?  No:      Patient Care Team:  Lara Tovar MD as PCP - General (Family Medicine)  Lara Tovar MD [facility] PCP - [facility]         Medical History Review  Past Medical, Family, and Social History reviewed and does contribute to the patient presenting condition    Health Maintenance   Topic Date Due    DTaP/Tdap/Td vaccine (1 - Tdap) 02/23/1958    Shingles Vaccine (1 of 2) 02/23/1989    Pneumococcal 65+ years Vaccine (1 of 1 - PPSV23) 02/23/2004    Potassium monitoring  02/09/2018    Creatinine monitoring  02/09/2018    Annual Wellness Visit (AWV)  02/13/2020    Flu vaccine (1) 11/12/2021 (Originally 9/1/2020)    Hepatitis A vaccine  Aged Out    Hepatitis B vaccine  Aged Out    Hib vaccine  Aged Out    Meningococcal (ACWY) vaccine  Aged Out

## 2021-05-18 ENCOUNTER — OFFICE VISIT (OUTPATIENT)
Dept: FAMILY MEDICINE CLINIC | Age: 81
End: 2021-05-18
Payer: MEDICARE

## 2021-05-18 VITALS
BODY MASS INDEX: 40.95 KG/M2 | WEIGHT: 285.4 LBS | DIASTOLIC BLOOD PRESSURE: 78 MMHG | SYSTOLIC BLOOD PRESSURE: 118 MMHG | HEART RATE: 60 BPM | RESPIRATION RATE: 20 BRPM

## 2021-05-18 DIAGNOSIS — R00.0 WIDE-COMPLEX TACHYCARDIA: ICD-10-CM

## 2021-05-18 DIAGNOSIS — E22.0 ACROMEGALY (HCC): ICD-10-CM

## 2021-05-18 DIAGNOSIS — E78.2 MIXED HYPERLIPIDEMIA: ICD-10-CM

## 2021-05-18 DIAGNOSIS — R73.01 IFG (IMPAIRED FASTING GLUCOSE): ICD-10-CM

## 2021-05-18 DIAGNOSIS — I25.83 CORONARY ARTERY DISEASE DUE TO LIPID RICH PLAQUE: ICD-10-CM

## 2021-05-18 DIAGNOSIS — D35.2 PITUITARY MACROADENOMA (HCC): ICD-10-CM

## 2021-05-18 DIAGNOSIS — I25.10 CORONARY ARTERY DISEASE DUE TO LIPID RICH PLAQUE: ICD-10-CM

## 2021-05-18 DIAGNOSIS — I10 ESSENTIAL HYPERTENSION: Primary | ICD-10-CM

## 2021-05-18 DIAGNOSIS — E66.01 MORBIDLY OBESE (HCC): ICD-10-CM

## 2021-05-18 PROCEDURE — 99214 OFFICE O/P EST MOD 30 MIN: CPT | Performed by: FAMILY MEDICINE

## 2021-05-18 SDOH — ECONOMIC STABILITY: FOOD INSECURITY: WITHIN THE PAST 12 MONTHS, THE FOOD YOU BOUGHT JUST DIDN'T LAST AND YOU DIDN'T HAVE MONEY TO GET MORE.: NEVER TRUE

## 2021-05-18 SDOH — ECONOMIC STABILITY: FOOD INSECURITY: WITHIN THE PAST 12 MONTHS, YOU WORRIED THAT YOUR FOOD WOULD RUN OUT BEFORE YOU GOT MONEY TO BUY MORE.: NEVER TRUE

## 2021-05-18 ASSESSMENT — PATIENT HEALTH QUESTIONNAIRE - PHQ9
SUM OF ALL RESPONSES TO PHQ9 QUESTIONS 1 & 2: 0
SUM OF ALL RESPONSES TO PHQ QUESTIONS 1-9: 0
2. FEELING DOWN, DEPRESSED OR HOPELESS: 0
SUM OF ALL RESPONSES TO PHQ QUESTIONS 1-9: 0

## 2021-05-18 ASSESSMENT — ENCOUNTER SYMPTOMS
RESPIRATORY NEGATIVE: 1
GASTROINTESTINAL NEGATIVE: 1

## 2021-05-18 NOTE — PROGRESS NOTES
2021    Judith Marin (:  1940) is a 79 y.o. male, here for a preventive medicine evaluation. Chief Complaint   Patient presents with    6 Month Follow-Up     6 month eval.  Doing well overall. BP and weight stable. 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 Last MRI in 2017. No progression of his acromegaly. Has not had labs or MRI for a few years. Denies vision changes. Hx of CAD per chart. Last stress and echo ok in 2017. No longer seeing Cardio. Due for labs and immunizations. 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  Medication monitoring encounter    S/P laparoscopic cholecystectomy    ED (erectile dysfunction)    Pituitary mass ([facility]) follows at [facility]    Wide-complex tachycardia ([address]    near syncope  dizziness    Generalized headache new today  vertex    Frequent PVCs    Ataxic gait    Accelerated hypertension    Testicular pain, left    Episodic lightheadedness, not positional    Cervical muscle pain       Review of Systems   Constitutional: Negative. HENT: Negative. Respiratory: Negative. Cardiovascular: Negative. Gastrointestinal: Negative. Musculoskeletal: Negative. All other systems reviewed and are negative. Prior to Visit Medications    Medication Sig Taking?  Authorizing Provider   Cholecalciferol (VITAMIN D-3 PO) Take by mouth Yes Historical Provider, MD   ZINC PO Take by mouth Yes Historical Provider, MD   lisinopril (PRINIVIL;ZESTRIL) 20 MG tablet Take 1 tablet by mouth 2 times daily Yes Darshan Solis MD   metoprolol tartrate (LOPRESSOR) 50 MG tablet Take 1 tablet by mouth 2 times daily Yes Darshan Solis MD   Misc Natural Products (GLUCOSAMINE CHONDROITIN MSM PO) Take by mouth Yes Historical Provider, MD   ibuprofen (ADVIL;MOTRIN) 200 MG tablet Take 200 mg by mouth every 6 hours as needed for Pain Yes Historical Provider, MD   docusate sodium (COLACE) 100 MG capsule Take 100 mg by mouth every evening  Yes Historical Provider, MD   multivitamin (THERAGRAN) per tablet Take 1 tablet by mouth daily  Yes Historical Provider, MD   Lysine 500 MG CAPS Take 500 mg by mouth. 2 daily    Yes Historical Provider, MD   Arginine 500 MG CAPS Take 1,000 mg by mouth daily  Yes Historical Provider, MD   [place] 250 MG CAPS Take 250 mg by mouth Once or twice daily Yes Historical Provider, MD   B Complex Vitamins (VITAMIN B COMPLEX PO) Take  by mouth. Twice weekly  Yes Historical Provider, MD        Allergies   Allergen Reactions    Dilaudid [Hydromorphone Hcl] Shortness Of Breath and Swelling    Hydrocodone-Acetaminophen Other (See Comments)     Visual halucinations.        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    Zoster 2009       Past Surgical History:   Procedure Laterality Date    ABDOMEN SURGERY  unsure    CARDIAC CATHETERIZATION      CHOLECYSTECTOMY      COLONOSCOPY      DENTAL SURGERY      at age 23 yr   [facility] HERNIA REPAIR  2003    right inguinal    TONSILLECTOMY  1946    TONSILLECTOMY AND ADENOIDECTOMY      TOOTH EXTRACTION  unsure       Social History     Socioeconomic History    Marital status:      Spouse name: Hansa Minus Number of children: 6    Years of education: 15  Highest education level: Associate degree: occupational, technical, or vocational program   Occupational History    Not on file   Tobacco Use    Smoking status: Former Smoker     Packs/day: 2.00     Years: 10.00     Pack years: 20.00     Types: Cigarettes     Quit date: 1961     Years since quittin.6    Smokeless tobacco: Never Used   Substance and Sexual Activity    Alcohol use: No    Drug use: No    Sexual activity: Yes     Partners: Female   Other Topics Concern    Not on file   Social History Narrative    Not on file     Social Determinants of Health     Financial Resource Strain: Low Risk     Difficulty of Paying Living Expenses: Not hard at all   Food Insecurity: No Food Insecurity    Worried About [address] in the Last Year: Never true    [address] in the Last Year: Never true   Transportation Needs:     Lack of Transportation (Medical):      Lack of Transportation (Non-Medical):    Physical Activity:     Days of Exercise per Week:     Minutes of Exercise per Session:    Stress:     Feeling of Stress :    Social Connections:     Frequency of Communication with Friends and Family:     Frequency of Social Gatherings with Friends and Family:     Attends Mandaen Services:     Active Member of Clubs or Organizations:     Attends Club or Organization Meetings:     Marital Status:    Intimate Partner Violence:     Fear of Current or Ex-Partner:     Emotionally Abused:     Physically Abused:     Sexually Abused:         Family History   Problem Relation Age of Onset    Heart Disease Father     Hearing Loss Father     High Blood Pressure Father     Arthritis Father     Arthritis Mother     Cancer Mother     High Blood Pressure Mother     Vision Loss Mother     High Blood Pressure Brother     Other Brother         bilateral knee repacement    Stroke Brother        ADVANCE DIRECTIVE: N, <no information>    Vitals:    21 1353   BP: 118/78   Site: reasons: The 2013 ASCVD risk score is only valid for ages 36 to 78    There is no immunization history for the selected administration types on file for this patient. Health Maintenance   Topic Date Due    COVID-19 Vaccine (1) Never done    DTaP/Tdap/Td vaccine (1 - Tdap) Never done    Shingles Vaccine (1 of 2) Never done    Pneumococcal 65+ years Vaccine (1 of 1 - PPSV23) Never done    Potassium monitoring  02/09/2018    Creatinine monitoring  02/09/2018    Flu vaccine (Season Ended) 11/12/2021 (Originally 9/1/2021)    Annual Wellness Visit (AWV)  11/13/2021    Hepatitis A vaccine  Aged Out    Hepatitis B vaccine  Aged Out    Hib vaccine  Aged Out    Meningococcal (ACWY) vaccine  Aged Out          ASSESSMENT/PLAN:  1. Essential hypertension  -     CBC Auto Differential; Future  2. Wide-complex tachycardia ([address]  3. Morbidly obese ([address]  4. Acromegaly ([address]  5. Pituitary macroadenoma ([address]  6. Mixed hyperlipidemia  -     Lipid Panel w/ Reflex Direct LDL; Future  -     Comprehensive Metabolic Panel; Future  -     TSH with Reflex; Future  7. Coronary artery disease due to lipid rich plaque  8. IFG (impaired fasting glucose)  -     Comprehensive Metabolic Panel; Future  -     Hemoglobin A1C; Future    -  Chronic medical problems stable  -  Continue current medications  -  Check labs, will call  -  Discussed follow up imaging and labs for #5, declines at this time  -  Declines immunizations    Return in about 1 year (around 5/18/2022) for HTN. An electronic signature was used to authenticate this note.     --Reena Coulter DO on 5/18/2021 at 2:42 PM

## 2021-05-25 LAB
AVERAGE GLUCOSE: NORMAL
CHOLESTEROL, TOTAL: 221 MG/DL
CHOLESTEROL/HDL RATIO: NORMAL
HBA1C MFR BLD: 5.5 %
HDLC SERPL-MCNC: 46 MG/DL (ref 35–70)
LDL CHOLESTEROL CALCULATED: 142 MG/DL (ref 0–160)
NONHDLC SERPL-MCNC: NORMAL MG/DL
TRIGL SERPL-MCNC: 185 MG/DL
TSH SERPL DL<=0.05 MIU/L-ACNC: 3.69 UIU/ML
VLDLC SERPL CALC-MCNC: NORMAL MG/DL

## 2021-07-23 ENCOUNTER — OFFICE VISIT (OUTPATIENT)
Dept: FAMILY MEDICINE CLINIC | Age: 81
End: 2021-07-23
Payer: MEDICARE

## 2021-07-23 VITALS
DIASTOLIC BLOOD PRESSURE: 76 MMHG | HEART RATE: 56 BPM | SYSTOLIC BLOOD PRESSURE: 126 MMHG | RESPIRATION RATE: 20 BRPM | BODY MASS INDEX: 40.75 KG/M2 | WEIGHT: 284 LBS

## 2021-07-23 DIAGNOSIS — L02.419 ABSCESS, AXILLA: Primary | ICD-10-CM

## 2021-07-23 PROCEDURE — 99213 OFFICE O/P EST LOW 20 MIN: CPT | Performed by: FAMILY MEDICINE

## 2021-07-23 RX ORDER — SULFAMETHOXAZOLE AND TRIMETHOPRIM 800; 160 MG/1; MG/1
1 TABLET ORAL 2 TIMES DAILY
Qty: 20 TABLET | Refills: 0 | Status: SHIPPED | OUTPATIENT
Start: 2021-07-23 | End: 2021-08-02

## 2021-07-23 ASSESSMENT — ENCOUNTER SYMPTOMS
RESPIRATORY NEGATIVE: 1
GASTROINTESTINAL NEGATIVE: 1

## 2021-07-23 NOTE — PROGRESS NOTES
Sylvia Sweet (:  1940) is a 79 y.o. male,Established patient, here for evaluation of the following chief complaint(s): Other (lump under left arm - red and warm) and Health Maintenance (needs a pneumonia vaccine)                Subjective   SUBJECTIVE/OBJECTIVE:  HPI:    Chief Complaint   Patient presents with    Other     lump under left arm - red and warm    Health Maintenance     needs a pneumonia vaccine     Pt here for lump under his left arm x 3-4 days. Sore to touch. Started draining yesterday. 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  Medication monitoring encounter    S/P laparoscopic cholecystectomy    ED (erectile dysfunction)    [facility]) follows at [facility]    Wide-complex tachycardia ([address]    near syncope  dizziness    Generalized headache new today  vertex    Frequent PVCs    Ataxic gait    Accelerated hypertension    Testicular pain, left    Episodic lightheadedness, not positional    Cervical muscle pain     Past Surgical History:   Procedure Laterality Date    ABDOMEN SURGERY  unsure    CARDIAC CATHETERIZATION      CHOLECYSTECTOMY      COLONOSCOPY      DENTAL SURGERY      at age 23 yr    HERNIA REPAIR  2003    right inguinal    TONSILLECTOMY  1946    TONSILLECTOMY AND ADENOIDECTOMY      TOOTH EXTRACTION  unsure     Social History     Tobacco Use    Smoking status: Former Smoker     Packs/day: 2.00     Years: 10.00     Pack years: 20.00     Types: Cigarettes     Quit date: 1961     Years since quittin.8    Smokeless tobacco: Never Used   Substance Use Topics    Alcohol use: No    Drug use: No         Review of Systems   Constitutional: Negative. HENT: Negative. Respiratory: Negative. Cardiovascular: Negative. Gastrointestinal: Negative. Musculoskeletal: Negative. Skin:        Lump under left arm   All other systems reviewed and are negative. Objective   Physical Exam  Vitals and nursing note reviewed. Constitutional:       General: He is not in acute distress. Appearance: Normal appearance. He is well-developed. HENT:      Head: Normocephalic and atraumatic. Right Ear: Tympanic membrane normal.      Left Ear: Tympanic membrane normal.   Eyes:      Conjunctiva/sclera: Conjunctivae normal.   Cardiovascular:      Rate and Rhythm: Normal rate and regular rhythm. Heart sounds: Normal heart sounds. No murmur heard. Pulmonary:      Effort: Pulmonary effort is normal.      Breath sounds: Normal breath sounds. No wheezing, rhonchi or rales. Abdominal:      General: There is no distension. Musculoskeletal:      Cervical back: Neck supple. Skin:     General: Skin is warm and dry. Findings: No rash (on exposed surfaces). Neurological:      General: No focal deficit present. Mental Status: He is alert. Psychiatric:         Attention and Perception: Attention normal.         Mood and Affect: Mood normal.         Speech: Speech normal.         Behavior: Behavior normal. Behavior is cooperative. Thought Content: Thought content normal.         Judgment: Judgment normal.     ASSESSMENT/PLAN:  1. Abscess, axilla  -     sulfamethoxazole-trimethoprim (BACTRIM DS) 800-160 MG per tablet; Take 1 tablet by mouth 2 times daily for 10 days, Disp-20 tablet, R-0Normal  -     mupirocin (BACTROBAN) 2 % ointment; Apply 3 times daily. , Disp-30 g, R-0, Normal    -  Orders above  -  Warm compresses    Return if symptoms worsen or fail to improve. An electronic signature was used to authenticate this note.     --Marval Heimlich,

## 2021-10-18 ENCOUNTER — APPOINTMENT (OUTPATIENT)
Dept: GENERAL RADIOLOGY | Age: 81
End: 2021-10-18
Payer: MEDICARE

## 2021-10-18 ENCOUNTER — HOSPITAL ENCOUNTER (OUTPATIENT)
Age: 81
Setting detail: OBSERVATION
Discharge: HOME | End: 2021-10-19
Attending: EMERGENCY MEDICINE | Admitting: STUDENT IN AN ORGANIZED HEALTH CARE EDUCATION/TRAINING PROGRAM
Payer: MEDICARE

## 2021-10-18 ENCOUNTER — TELEPHONE (OUTPATIENT)
Dept: FAMILY MEDICINE CLINIC | Age: 81
End: 2021-10-18

## 2021-10-18 DIAGNOSIS — U07.1 ACUTE HYPOXEMIC RESPIRATORY FAILURE DUE TO COVID-19 (HCC): Primary | ICD-10-CM

## 2021-10-18 DIAGNOSIS — J96.01 ACUTE HYPOXEMIC RESPIRATORY FAILURE DUE TO COVID-19 (HCC): Primary | ICD-10-CM

## 2021-10-18 PROBLEM — J12.82 PNEUMONIA DUE TO COVID-19 VIRUS: Status: ACTIVE | Noted: 2021-10-18

## 2021-10-18 LAB
ALBUMIN SERPL-MCNC: 3.6 G/DL (ref 3.5–5.1)
ALP BLD-CCNC: 58 U/L (ref 38–126)
ALT SERPL-CCNC: 36 U/L (ref 11–66)
ANION GAP SERPL CALCULATED.3IONS-SCNC: 7 MEQ/L (ref 8–16)
AST SERPL-CCNC: 49 U/L (ref 5–40)
BASOPHILS # BLD: 0.4 %
BASOPHILS ABSOLUTE: 0 THOU/MM3 (ref 0–0.1)
BILIRUB SERPL-MCNC: 0.5 MG/DL (ref 0.3–1.2)
BILIRUBIN DIRECT: < 0.2 MG/DL (ref 0–0.3)
BUN BLDV-MCNC: 15 MG/DL (ref 7–22)
C-REACTIVE PROTEIN: 2.5 MG/DL (ref 0–1)
CALCIUM SERPL-MCNC: 8.9 MG/DL (ref 8.5–10.5)
CHLORIDE BLD-SCNC: 101 MEQ/L (ref 98–111)
CO2: 30 MEQ/L (ref 23–33)
CREAT SERPL-MCNC: 0.9 MG/DL (ref 0.4–1.2)
EKG ATRIAL RATE: 69 BPM
EKG P AXIS: 60 DEGREES
EKG P-R INTERVAL: 206 MS
EKG Q-T INTERVAL: 368 MS
EKG QRS DURATION: 106 MS
EKG QTC CALCULATION (BAZETT): 394 MS
EKG R AXIS: -21 DEGREES
EKG T AXIS: 56 DEGREES
EKG VENTRICULAR RATE: 69 BPM
EOSINOPHIL # BLD: 0.4 %
EOSINOPHILS ABSOLUTE: 0 THOU/MM3 (ref 0–0.4)
ERYTHROCYTE [DISTWIDTH] IN BLOOD BY AUTOMATED COUNT: 13.1 % (ref 11.5–14.5)
ERYTHROCYTE [DISTWIDTH] IN BLOOD BY AUTOMATED COUNT: 46.5 FL (ref 35–45)
FERRITIN: 1241 NG/ML (ref 22–322)
GFR SERPL CREATININE-BSD FRML MDRD: 81 ML/MIN/1.73M2
GLUCOSE BLD-MCNC: 110 MG/DL (ref 70–108)
HCT VFR BLD CALC: 39.1 % (ref 42–52)
HEMOGLOBIN: 13 GM/DL (ref 14–18)
IMMATURE GRANS (ABS): 0.02 THOU/MM3 (ref 0–0.07)
IMMATURE GRANULOCYTES: 0.7 %
LACTIC ACID, SEPSIS: 1.5 MMOL/L (ref 0.5–1.9)
LYMPHOCYTES # BLD: 31.1 %
LYMPHOCYTES ABSOLUTE: 0.9 THOU/MM3 (ref 1–4.8)
MCH RBC QN AUTO: 32.3 PG (ref 26–33)
MCHC RBC AUTO-ENTMCNC: 33.2 GM/DL (ref 32.2–35.5)
MCV RBC AUTO: 97.3 FL (ref 80–94)
MONOCYTES # BLD: 15.7 %
MONOCYTES ABSOLUTE: 0.4 THOU/MM3 (ref 0.4–1.3)
NUCLEATED RED BLOOD CELLS: 0 /100 WBC
OSMOLALITY CALCULATION: 277.1 MOSMOL/KG (ref 275–300)
PLATELET # BLD: 131 THOU/MM3 (ref 130–400)
PMV BLD AUTO: 10 FL (ref 9.4–12.4)
POTASSIUM REFLEX MAGNESIUM: 4.5 MEQ/L (ref 3.5–5.2)
PRO-BNP: 280.5 PG/ML (ref 0–1800)
PROCALCITONIN: 0.1 NG/ML (ref 0.01–0.09)
RBC # BLD: 4.02 MILL/MM3 (ref 4.7–6.1)
SARS-COV-2, NAAT: DETECTED
SCAN OF BLOOD SMEAR: NORMAL
SEG NEUTROPHILS: 51.7 %
SEGMENTED NEUTROPHILS ABSOLUTE COUNT: 1.4 THOU/MM3 (ref 1.8–7.7)
SODIUM BLD-SCNC: 138 MEQ/L (ref 135–145)
TOTAL PROTEIN: 6.3 G/DL (ref 6.1–8)
TROPONIN T: < 0.01 NG/ML
WBC # BLD: 2.8 THOU/MM3 (ref 4.8–10.8)

## 2021-10-18 PROCEDURE — 1200000000 HC SEMI PRIVATE

## 2021-10-18 PROCEDURE — G0378 HOSPITAL OBSERVATION PER HR: HCPCS

## 2021-10-18 PROCEDURE — 6360000002 HC RX W HCPCS: Performed by: STUDENT IN AN ORGANIZED HEALTH CARE EDUCATION/TRAINING PROGRAM

## 2021-10-18 PROCEDURE — 87635 SARS-COV-2 COVID-19 AMP PRB: CPT

## 2021-10-18 PROCEDURE — 96361 HYDRATE IV INFUSION ADD-ON: CPT

## 2021-10-18 PROCEDURE — 93005 ELECTROCARDIOGRAM TRACING: CPT | Performed by: EMERGENCY MEDICINE

## 2021-10-18 PROCEDURE — 84484 ASSAY OF TROPONIN QUANT: CPT

## 2021-10-18 PROCEDURE — 99219 PR INITIAL OBSERVATION CARE/DAY 50 MINUTES: CPT | Performed by: STUDENT IN AN ORGANIZED HEALTH CARE EDUCATION/TRAINING PROGRAM

## 2021-10-18 PROCEDURE — 6370000000 HC RX 637 (ALT 250 FOR IP): Performed by: STUDENT IN AN ORGANIZED HEALTH CARE EDUCATION/TRAINING PROGRAM

## 2021-10-18 PROCEDURE — 86140 C-REACTIVE PROTEIN: CPT

## 2021-10-18 PROCEDURE — 80076 HEPATIC FUNCTION PANEL: CPT

## 2021-10-18 PROCEDURE — 2580000003 HC RX 258: Performed by: STUDENT IN AN ORGANIZED HEALTH CARE EDUCATION/TRAINING PROGRAM

## 2021-10-18 PROCEDURE — 36415 COLL VENOUS BLD VENIPUNCTURE: CPT

## 2021-10-18 PROCEDURE — 83605 ASSAY OF LACTIC ACID: CPT

## 2021-10-18 PROCEDURE — 71045 X-RAY EXAM CHEST 1 VIEW: CPT

## 2021-10-18 PROCEDURE — 96372 THER/PROPH/DIAG INJ SC/IM: CPT

## 2021-10-18 PROCEDURE — 85025 COMPLETE CBC W/AUTO DIFF WBC: CPT

## 2021-10-18 PROCEDURE — 83880 ASSAY OF NATRIURETIC PEPTIDE: CPT

## 2021-10-18 PROCEDURE — 80048 BASIC METABOLIC PNL TOTAL CA: CPT

## 2021-10-18 PROCEDURE — 93010 ELECTROCARDIOGRAM REPORT: CPT | Performed by: NUCLEAR MEDICINE

## 2021-10-18 PROCEDURE — 96374 THER/PROPH/DIAG INJ IV PUSH: CPT

## 2021-10-18 PROCEDURE — 82728 ASSAY OF FERRITIN: CPT

## 2021-10-18 PROCEDURE — 99284 EMERGENCY DEPT VISIT MOD MDM: CPT

## 2021-10-18 PROCEDURE — 84145 PROCALCITONIN (PCT): CPT

## 2021-10-18 RX ORDER — 0.9 % SODIUM CHLORIDE 0.9 %
1000 INTRAVENOUS SOLUTION INTRAVENOUS ONCE
Status: COMPLETED | OUTPATIENT
Start: 2021-10-18 | End: 2021-10-18

## 2021-10-18 RX ORDER — SODIUM CHLORIDE 0.9 % (FLUSH) 0.9 %
5-40 SYRINGE (ML) INJECTION PRN
Status: DISCONTINUED | OUTPATIENT
Start: 2021-10-18 | End: 2021-10-19 | Stop reason: HOSPADM

## 2021-10-18 RX ORDER — DEXAMETHASONE SODIUM PHOSPHATE 4 MG/ML
6 INJECTION, SOLUTION INTRA-ARTICULAR; INTRALESIONAL; INTRAMUSCULAR; INTRAVENOUS; SOFT TISSUE EVERY 24 HOURS
Status: DISCONTINUED | OUTPATIENT
Start: 2021-10-19 | End: 2021-10-19 | Stop reason: HOSPADM

## 2021-10-18 RX ORDER — ASCORBIC ACID 500 MG
500 TABLET ORAL DAILY
Status: DISCONTINUED | OUTPATIENT
Start: 2021-10-18 | End: 2021-10-19 | Stop reason: HOSPADM

## 2021-10-18 RX ORDER — ONDANSETRON 2 MG/ML
4 INJECTION INTRAMUSCULAR; INTRAVENOUS EVERY 6 HOURS PRN
Status: DISCONTINUED | OUTPATIENT
Start: 2021-10-18 | End: 2021-10-19 | Stop reason: HOSPADM

## 2021-10-18 RX ORDER — LISINOPRIL 20 MG/1
20 TABLET ORAL 2 TIMES DAILY
Status: DISCONTINUED | OUTPATIENT
Start: 2021-10-18 | End: 2021-10-19 | Stop reason: HOSPADM

## 2021-10-18 RX ORDER — SODIUM CHLORIDE 0.9 % (FLUSH) 0.9 %
5-40 SYRINGE (ML) INJECTION EVERY 12 HOURS SCHEDULED
Status: DISCONTINUED | OUTPATIENT
Start: 2021-10-18 | End: 2021-10-19 | Stop reason: HOSPADM

## 2021-10-18 RX ORDER — DEXAMETHASONE SODIUM PHOSPHATE 4 MG/ML
6 INJECTION, SOLUTION INTRA-ARTICULAR; INTRALESIONAL; INTRAMUSCULAR; INTRAVENOUS; SOFT TISSUE ONCE
Status: COMPLETED | OUTPATIENT
Start: 2021-10-18 | End: 2021-10-18

## 2021-10-18 RX ORDER — METOPROLOL TARTRATE 50 MG/1
50 TABLET, FILM COATED ORAL 2 TIMES DAILY
Status: DISCONTINUED | OUTPATIENT
Start: 2021-10-18 | End: 2021-10-19 | Stop reason: HOSPADM

## 2021-10-18 RX ORDER — ONDANSETRON 4 MG/1
4 TABLET, ORALLY DISINTEGRATING ORAL EVERY 8 HOURS PRN
Status: DISCONTINUED | OUTPATIENT
Start: 2021-10-18 | End: 2021-10-19 | Stop reason: HOSPADM

## 2021-10-18 RX ORDER — ZINC SULFATE 50(220)MG
50 CAPSULE ORAL DAILY
Status: DISCONTINUED | OUTPATIENT
Start: 2021-10-18 | End: 2021-10-19 | Stop reason: HOSPADM

## 2021-10-18 RX ORDER — POLYETHYLENE GLYCOL 3350 17 G/17G
17 POWDER, FOR SOLUTION ORAL DAILY PRN
Status: DISCONTINUED | OUTPATIENT
Start: 2021-10-18 | End: 2021-10-19 | Stop reason: HOSPADM

## 2021-10-18 RX ORDER — VITAMIN B COMPLEX
1000 TABLET ORAL DAILY
Status: DISCONTINUED | OUTPATIENT
Start: 2021-10-18 | End: 2021-10-19 | Stop reason: HOSPADM

## 2021-10-18 RX ORDER — SODIUM CHLORIDE 9 MG/ML
25 INJECTION, SOLUTION INTRAVENOUS PRN
Status: DISCONTINUED | OUTPATIENT
Start: 2021-10-18 | End: 2021-10-19 | Stop reason: HOSPADM

## 2021-10-18 RX ORDER — ALBUTEROL SULFATE 90 UG/1
2 AEROSOL, METERED RESPIRATORY (INHALATION) EVERY 6 HOURS PRN
Status: DISCONTINUED | OUTPATIENT
Start: 2021-10-18 | End: 2021-10-19 | Stop reason: HOSPADM

## 2021-10-18 RX ADMIN — Medication 1000 UNITS: at 18:05

## 2021-10-18 RX ADMIN — SODIUM CHLORIDE 1000 ML: 9 INJECTION, SOLUTION INTRAVENOUS at 13:39

## 2021-10-18 RX ADMIN — BARICITINIB 4 MG: 2 TABLET, FILM COATED ORAL at 22:00

## 2021-10-18 RX ADMIN — OXYCODONE HYDROCHLORIDE AND ACETAMINOPHEN 500 MG: 500 TABLET ORAL at 18:05

## 2021-10-18 RX ADMIN — ENOXAPARIN SODIUM 40 MG: 40 INJECTION SUBCUTANEOUS at 18:45

## 2021-10-18 RX ADMIN — LISINOPRIL 20 MG: 20 TABLET ORAL at 22:00

## 2021-10-18 RX ADMIN — DEXAMETHASONE SODIUM PHOSPHATE 6 MG: 4 INJECTION, SOLUTION INTRA-ARTICULAR; INTRALESIONAL; INTRAMUSCULAR; INTRAVENOUS; SOFT TISSUE at 13:39

## 2021-10-18 RX ADMIN — METOPROLOL TARTRATE 50 MG: 50 TABLET, FILM COATED ORAL at 22:00

## 2021-10-18 RX ADMIN — Medication 50 MG: at 18:05

## 2021-10-18 ASSESSMENT — ENCOUNTER SYMPTOMS
DIARRHEA: 0
NAUSEA: 0
CONSTIPATION: 0
ABDOMINAL PAIN: 0
CHEST TIGHTNESS: 0
ABDOMINAL DISTENTION: 0
COUGH: 1
SHORTNESS OF BREATH: 1

## 2021-10-18 ASSESSMENT — PAIN SCALES - GENERAL: PAINLEVEL_OUTOF10: 0

## 2021-10-18 NOTE — ED NOTES
Pt to room 36. Alert and oriented x4. Breathing easy and unlabored on 2L of O2 via NC. Pt denies all needs at this time. Family at bedside. Call light within reach. Will continue to monitor for safety and comfort.       Pauline Fuentes RN  10/18/21 2993

## 2021-10-18 NOTE — ED NOTES
ED to inpatient nurses report    Chief Complaint   Patient presents with    Concern For COVID-19    Cough      Present to ED from home  LOC: alert and orientated to name, place, date  Vital signs   Vitals:    10/18/21 1458 10/18/21 1604 10/18/21 1741 10/18/21 1847   BP:   (!) 181/93 (!) 162/89   Pulse: 66 65 71 74   Resp: 21 22 21 26   Temp:       TempSrc:       SpO2: 96% 96% 94% 95%   Weight:          Oxygen Baseline RA    Current needs required 2L Bipap/Cpap No  LDAs:   Peripheral IV 10/18/21 Right Antecubital (Active)   Site Assessment Clean;Dry; Intact 10/18/21 1338   Line Status Blood return noted; Flushed;Normal saline locked 10/18/21 1338   Dressing Status Clean;Dry; Intact 10/18/21 1338   Dressing Intervention New 10/18/21 1338     Mobility: Independent  Pending ED orders: NA  Present condition: Stable  Person of contract, phone number   Our promise was given to patient    Electronically signed by Marge Martinez RN on 10/18/2021 at [address] [place] [address], RN  10/18/21 8949

## 2021-10-18 NOTE — ACP (ADVANCE CARE PLANNING)
Advance Care Planning     Advance Care Planning Activator (Inpatient)  Conversation Note      Date of ACP Conversation: 10/18/2021     Conversation Conducted with: Patient with Decision Making Capacity    ACP Activator: Lenore Noonan RN, BSN, Dylan Gamez 82 Decision Maker:     Current Designated Health Care Decision Maker:     Primary Decision Maker: Pedrito Paredes - [phone]    Secondary Decision Maker: Dori [facility] - [phone]    Supplemental (Other) Decision Maker: Jemima Ahr - Child - [phone]    Today we documented Decision Maker(s) consistent with ACP documents on file. Care Preferences    Ventilation: \"If you were in your present state of health and suddenly became very ill and were unable to breathe on your own, what would your preference be about the use of a ventilator (breathing machine) if it were available to you? \"      Would the patient desire the use of ventilator (breathing machine)?: yes    \"If your health worsens and it becomes clear that your chance of recovery is unlikely, what would your preference be about the use of a ventilator (breathing machine) if it were available to you? \"     Would the patient desire the use of ventilator (breathing machine)?: No      Resuscitation  \"CPR works best to restart the heart when there is a sudden event, like a heart attack, in someone who is otherwise healthy. Unfortunately, CPR does not typically restart the heart for people who have serious health conditions or who are very sick. \"    \"In the event your heart stopped as a result of an underlying serious health condition, would you want attempts to be made to restart your heart (answer \"yes\" for attempt to resuscitate) or would you prefer a natural death (answer \"no\" for do not attempt to resuscitate)? \" yes       [x] Yes   [] No   Educated Patient / Tomah Hopes regarding differences between Advance Directives and portable DNR orders.     Length of ACP Conversation in minutes:  13  Conversation Outcomes:  [x] ACP discussion completed  [x] Existing advance directive reviewed with patient; no changes to patient's previously recorded wishes  [] New Advance Directive completed  [] Portable Do Not Rescitate prepared for Provider review and signature  [] POLST/POST/MOLST/MOST prepared for Provider review and signature      Follow-up plan:    [] Schedule follow-up conversation to continue planning  [] Referred individual to Provider for additional questions/concerns   [] Advised patient/agent/surrogate to review completed ACP document and update if needed with changes in condition, patient preferences or care setting    [x] This note routed to one or more involved healthcare providers     Patient A&O, step-daughter visiting in room. He is Covid (+). Nasal cannula in place. He has Living Will and HCPOA on file and does not wish to update. He asked if Olman Salmeron could be added and I indicated that if we do a different document, we could but since he has 3 children who are on the current document, he wishes to leave it as is. I discussed the importance of talking about his care preferences with them. He spoke of being coded before and wishes to remain a full code, although he spoke of how beautiful katiereji was. Added Huong Tsai number to the system from [facility]. No consults this date. I indicated to patient that should his oxygen needs increase, they may re-approach him about his ventilation preferences.

## 2021-10-18 NOTE — ED TRIAGE NOTES
Patient presents to the ED with wife as a patient in another room for concerns of covid. Patient states he and his wife started with a sore/scratchy throat last week Friday and is now coughing. His PCP advised he come in to be evaluated and tested for covid. He is not vaccinated. He denies any pain and denies any SOB. EKG completed. VSS. Respirations easy and regular.

## 2021-10-18 NOTE — H&P
Hospitalist - History & Physical      Patient: [facility] [place], [place]    Unit/Bed:36/036A  YOB: 1940  MRN: [id number]   Acct: [de-identified]   PCP: Cindy Simmons DO    Date of Service: Pt seen/examined on 10/18/21  and Admitted to [Inpatient] with expected LOS [greater than] two midnights due to medical therapy. Chief Complaint: Shortness of breath    Assessment and Plan:-  1. Acute apostle respiratory failure  2. COVID-19 pneumonia  a. Covid directed therapy  i. Decadron 10 mg 10/18. Start Decadron 6 mg 10/19.  ii. Baricitinib 10/18  iii. Vitamin C/D and zinc  iv. As needed albuterol  b. Incentive spirometer and Acapella  c. Currently on 2 L via nasal cannula. We will wean as tolerated. 3. Hypertension  a. Continue home medications      Disposition: Dissipate discharge home in 48 to 72 hours    History Of Present Illness:    Patient is a very pleasant 79-year-old male with past medical history of hypertension presents the hospital with shortness of breath, fevers, loss of taste/smell, and diarrhea. Patient states he has been having symptoms for approximately 1 week. His wife has been experiencing similar symptoms. Approximately 3 days prior to arrival, his wife was tested positive for COVID-19. The patient states that since that time, they have had worsening shortness of breath and dry cough. They spoke to their PCP who recommended coming to the hospital for evaluation. Upon arrival, patient was noted to be hypoxic and assistant placed on 2 L nasal cannula with improvement in his oxygen saturation. He was Covid positive and started on IV Decadron therapy.     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 Current Outpatient Medications on File Prior to Encounter   Medication Sig Dispense Refill    Cholecalciferol (VITAMIN D-3 PO) Take by mouth      ZINC PO Take by mouth      lisinopril (PRINIVIL;ZESTRIL) 20 MG tablet Take 1 tablet by mouth 2 times daily 180 tablet 3    metoprolol tartrate (LOPRESSOR) 50 MG tablet Take 1 tablet by mouth 2 times daily 180 tablet 3    Misc Natural Products (GLUCOSAMINE CHONDROITIN MSM PO) Take by mouth      ibuprofen (ADVIL;MOTRIN) 200 MG tablet Take 200 mg by mouth every 6 hours as needed for Pain      docusate sodium (COLACE) 100 MG capsule Take 100 mg by mouth every evening       multivitamin (THERAGRAN) per tablet Take 1 tablet by mouth daily       Lysine 500 MG CAPS Take 500 mg by mouth. 2 daily         Arginine 500 MG CAPS Take 1,000 mg by mouth daily       Olive Leaf 250 MG CAPS Take 250 mg by mouth Once or twice daily      B Complex Vitamins (VITAMIN B COMPLEX PO) Take  by mouth. Twice weekly          Allergies:    Dilaudid [hydromorphone hcl] and Hydrocodone-acetaminophen    Social History:    reports that he quit smoking about 60 years ago. His smoking use included cigarettes. He has a 20.00 pack-year smoking history. He has never used smokeless tobacco. He reports that he does not drink alcohol and does not use drugs.     Family History:       Problem Relation Age of Onset    Heart Disease Father     Hearing Loss Father     High Blood Pressure Father     Arthritis Father     Arthritis Mother     Cancer Mother     High Blood Pressure Mother     Vision Loss Mother     High Blood Pressure Brother     Other Brother         bilateral knee repacement    Stroke Brother        Diet:  ADULT DIET; Regular; No Added Salt (3-4 gm)    Review of systems:   Pertinent positives as noted in the HPI. All other systems reviewed and negative. PHYSICAL EXAM:  /77   Pulse 66   Temp 98.8 °F (37.1 °C) (Oral)   Resp 21   Wt 284 lb (128.8 kg)   SpO2 96%   BMI 40.75 kg/m²   General appearance: No apparent distress, appears stated age and cooperative. HEENT: Normal cephalic, atraumatic without obvious deformity. Pupils equal, round, and reactive to light. Extra ocular muscles intact. Conjunctivae/corneas clear. Neck: Supple, with full range of motion. No jugular venous distention. Trachea midline. Respiratory:  Normal respiratory effort. Clear to auscultation, bilaterally without Rales/Wheezes/Rhonchi. Cardiovascular: Regular rate and rhythm with normal S1/S2 without murmurs, rubs or gallops. Abdomen: Soft, non-tender, non-distended with normal bowel sounds. Musculoskeletal:  No clubbing, cyanosis or edema bilaterally. Skin: Skin color, texture, turgor normal.  No rashes or lesions. Neurologic:  Neurovascularly intact without any focal sensory/motor deficits. Cranial nerves: II-XII intact, grossly non-focal.  Psychiatric: Alert and oriented, thought content appropriate, normal insight  Capillary Refill: Brisk,< 3 seconds   Peripheral Pulses: +2 palpable, equal bilaterally     Labs:   Recent Labs     10/18/21  1202   WBC 2.8*   HGB 13.0*   HCT 39.1*        Recent Labs     10/18/21  1202      K 4.5      CO2 30   BUN 15   CREATININE 0.9   CALCIUM 8.9     Recent Labs     10/18/21  1202   AST 49*   ALT 36   BILIDIR <0.2   BILITOT 0.5   ALKPHOS 58     No results for input(s): INR in the last 72 hours.   No results for input(s): Bailee Lance in the last 72 hours. Urinalysis:    Lab Results   Component Value Date    NITRU NEGATIVE 08/24/2014    WBCUA 2 08/24/2014    BACTERIA NONE 08/24/2014    RBCUA 3 08/24/2014    BLOODU NEGATIVE 08/24/2014    SPECGRAV >1.030 08/24/2014       Radiology:   XR CHEST PORTABLE   Final Result   1. Patchy opacities seen in the right lung base and in the left lung base. Findings are concerning for infectious etiology. 2. Platelike atelectasis also seen in the left lung base. **This report has been created using voice recognition software. It may contain minor errors which are inherent in voice recognition technology. **      Final report electronically signed by Dr Reyes Panning on 10/18/2021 12:22 PM        XR CHEST PORTABLE    Result Date: 10/18/2021  PROCEDURE: XR CHEST PORTABLE CLINICAL INFORMATION: SOB, COVID COMPARISON: Chest radiograph 8/26/2014 TECHNIQUE: AP portable chest radiograph performed. FINDINGS: Patchy opacities seen in the right lung base. Patchy opacity and streaky opacities seen in the left lung base. Cardiac silhouette is mildly enlarged. Hyperinflation of the lungs may suggest underlying chronic lung disease or emphysematous changes. No pleural effusion. No pneumothorax. No acute bony abnormality. 1. Patchy opacities seen in the right lung base and in the left lung base. Findings are concerning for infectious etiology. 2. Platelike atelectasis also seen in the left lung base. **This report has been created using voice recognition software. It may contain minor errors which are inherent in voice recognition technology. ** Final report electronically signed by Dr Reyes Panning on 10/18/2021 12:22 PM    Electronically signed by Karina Coughlin DO on 10/18/2021 at 4:01 PM

## 2021-10-18 NOTE — ED NOTES
Pt sitting up. Eating. Pt medicated per order. Call light within reach. Will continue to monitor for safety and comfort.       Pauline Fuentes RN  10/18/21 1357

## 2021-10-18 NOTE — ED PROVIDER NOTES
pain     Physical Exam     Gen:     Non-toxic, well appearing  Head:   AT/NC               EOMI, NEGIN               Oropharynx Clear, mucous membranes moist  Neck:    Supple, no meningismus; No LAD  Chest:    Abd:      SNT/ND, obese; No rebound/guarding/rigidity. Neg McBurneys  Extrem: No edema, neg homans.   Neuro:   Alert, Awake, no lateralizing deficts               CN's grossly intact bilaterally    DIAGNOSTIC RESULTS   RADIOLOGY:   XR CHEST PORTABLE    (Results Pending)       LABS:  Labs Reviewed   COVID-19, RAPID   CBC WITH AUTO DIFFERENTIAL   BASIC METABOLIC PANEL W/ REFLEX TO MG FOR LOW K   HEPATIC FUNCTION PANEL   TROPONIN   BRAIN NATRIURETIC PEPTIDE   LACTATE, SEPSIS   LACTATE, SEPSIS   FERRITIN   C-REACTIVE PROTEIN       EMERGENCY DEPARTMENT COURSE:        Medical Decision-Making:   Hypoxic, likely COVID19  Imaging consistent with pneumonia  Labs  Imaging  Plan admit for decadron/supplemental oxygen    Keysha Borges [facility]  Attending Emergency Physician       Linsey Peralta DO  10/18/21 7851

## 2021-10-18 NOTE — ED PROVIDER NOTES
[place] ENCOUNTER          Pt Name: June Rockwell  MRN: [id number]  [place] 1940  Date of evaluation: 10/18/2021  Treating Resident Physician: Brendon Acevedo MD  Supervising Physician: Kai Awad       Chief Complaint   Patient presents with    Concern For COVID-19    Cough     History obtained from the patient. HISTORY OF PRESENT ILLNESS    HPI  June Rockwell is a 79 y.o. male who presents to the emergency department for evaluation of hypoxia at home. Patient has not been diagnosed with Covid, however his wife tested positive for Covid last week. Patient has been symptomatic since then, however has had worsening shortness of breath over the past several days. Her daughter, who is her caretaker, patient has been satting consistently in the mid to low 80s at home. Satting 89% here at rest.  Patient denies any chest pain, complaining only of shortness of breath, cough, malaise. Denies fever, chills  The patient has no other acute complaints at this time. REVIEW OF SYSTEMS   Review of Systems   Constitutional: Positive for fatigue. Negative for chills, diaphoresis and fever. HENT: Negative. Respiratory: Positive for cough and shortness of breath. Negative for chest tightness. Cardiovascular: Negative for chest pain, palpitations and leg swelling. Gastrointestinal: Negative for abdominal distention, abdominal pain, constipation, diarrhea and nausea. Genitourinary: Negative. Musculoskeletal: Positive for arthralgias and myalgias. Neurological: Negative.            PAST MEDICAL AND SURGICAL HISTORY     Past Medical History:   Diagnosis Date    Adenomatous colon polyp 2011    Blood transfusion reaction     Erectile dysfunction     History of blood transfusion     Hyperlipidemia 2005    Hypertension 2008    Other disorders of kidney and ureter in diseases classified elsewhere     Pituitary macroadenoma ([facility] [address] 2009    Pneumonia due to COVID-19 virus 10/18/2021    Zoster 2009     Past Surgical History:   Procedure Laterality Date    ABDOMEN SURGERY  unsure    CARDIAC CATHETERIZATION      CHOLECYSTECTOMY      COLONOSCOPY      DENTAL SURGERY      at age 23 yr   [address]  2003    right inguinal    TONSILLECTOMY  1946    TONSILLECTOMY AND ADENOIDECTOMY      TOOTH EXTRACTION  unsure         MEDICATIONS     Current Facility-Administered Medications:     sodium chloride flush 0.9 % injection 5-40 mL, 5-40 mL, IntraVENous, 2 times per day, Catalino Zuleta, DO    sodium chloride flush 0.9 % injection 5-40 mL, 5-40 mL, IntraVENous, PRN, Catalino Zuleta DO    0.9 % sodium chloride infusion, 25 mL, IntraVENous, PRN, Catalino Zuleta DO    enoxaparin (LOVENOX) injection 40 mg, 40 mg, SubCUTAneous, Q24H, Riley Daniels DO, 40 mg at 10/18/21 1845    ondansetron (ZOFRAN-ODT) disintegrating tablet 4 mg, 4 mg, Oral, Q8H PRN **OR** ondansetron (ZOFRAN) injection 4 mg, 4 mg, IntraVENous, Q6H PRN, Catalino Zuleta DO    polyethylene glycol (GLYCOLAX) packet 17 g, 17 g, Oral, Daily PRN, Catalino Zuleta DO    [START ON 10/19/2021] dexamethasone (DECADRON) injection 6 mg, 6 mg, IntraVENous, Q24H, Riley Daniels DO    albuterol sulfate  (90 Base) MCG/ACT inhaler 2 puff, 2 puff, Inhalation, Q6H PRN, Catalino Zuleta DO    ascorbic acid (VITAMIN C) tablet 500 mg, 500 mg, Oral, Daily, Catalino Zuleta DO, 500 mg at 10/18/21 1805    Vitamin D (CHOLECALCIFEROL) tablet 1,000 Units, 1,000 Units, Oral, Daily, Catalino Zuleta DO, 1,000 Units at 10/18/21 1805    zinc sulfate (ZINCATE) capsule 50 mg, 50 mg, Oral, Daily, Catalino Zuleta DO, 50 mg at 10/18/21 1805    lisinopril (PRINIVIL;ZESTRIL) tablet 20 mg, 20 mg, Oral, BID, Vernia Conn, DO    metoprolol tartrate (LOPRESSOR) tablet 50 mg, 50 mg, Oral, BID, Riley Daniels,     baricitinib (OLUMIANT) tablet 4 mg, 4 mg, Oral, Pharynx: Oropharynx is clear. Eyes:      Extraocular Movements: Extraocular movements intact. Conjunctiva/sclera: Conjunctivae normal.      Pupils: Pupils are equal, round, and reactive to light. Cardiovascular:      Rate and Rhythm: Normal rate and regular rhythm. Pulmonary:      Effort: No respiratory distress. Breath sounds: Rales present. Comments: tachypnea  Chest:      Chest wall: No tenderness. Abdominal:      General: Abdomen is flat. Bowel sounds are normal. There is no distension. Palpations: Abdomen is soft. Tenderness: There is no abdominal tenderness. There is no left CVA tenderness, guarding or rebound. Musculoskeletal:         General: No swelling or tenderness. Normal range of motion. Skin:     General: Skin is warm and dry. Capillary Refill: Capillary refill takes less than 2 seconds. Neurological:      General: No focal deficit present. Mental Status: He is alert and oriented to person, place, and time. Mental status is at baseline. Motor: No weakness. Coordination: Coordination normal.              MEDICAL DECISION MAKING   Initial Assessment:   3 58-year-old male, history of smoking for couple years in the 62s, history of hypertension, hyperlipidemia, pituitary macroadenoma, CAD, presenting with hypoxia from home, complaining of legs and shortness of breath. Physical exam significant for hypoxia, tachypnea, rales in bilateral bases. Plan:    CBC, CMP, troponin, EKG, CRP, procalcitonin, ferritin. Covid.  Labs significant for positive Covid, procalcitonin 0.1, elevated CRP, elevated ferritin, leukopenia.    Patient given Decadron, fluids   Will admit for acute hypoxic respiratory failure secondary to COVID-19        ED RESULTS   Laboratory results:  Labs Reviewed   COVID-19, RAPID - Abnormal; Notable for the following components:       Result Value    SARS-CoV-2, NAAT DETECTED (*)     All other components within normal limits   CBC WITH AUTO DIFFERENTIAL - Abnormal; Notable for the following components:    WBC 2.8 (*)     RBC 4.02 (*)     Hemoglobin 13.0 (*)     Hematocrit 39.1 (*)     MCV 97.3 (*)     RDW-SD 46.5 (*)     Segs Absolute 1.4 (*)     Lymphocytes Absolute 0.9 (*)     All other components within normal limits   BASIC METABOLIC PANEL W/ REFLEX TO MG FOR LOW K - Abnormal; Notable for the following components:    Glucose 110 (*)     All other components within normal limits   HEPATIC FUNCTION PANEL - Abnormal; Notable for the following components:    AST 49 (*)     All other components within normal limits   FERRITIN - Abnormal; Notable for the following components:    Ferritin 1,241 (*)     All other components within normal limits   C-REACTIVE PROTEIN - Abnormal; Notable for the following components:    CRP 2.50 (*)     All other components within normal limits   PROCALCITONIN - Abnormal; Notable for the following components:    Procalcitonin 0.10 (*)     All other components within normal limits   ANION GAP - Abnormal; Notable for the following components:    Anion Gap 7.0 (*)     All other components within normal limits   GLOMERULAR FILTRATION RATE, ESTIMATED - Abnormal; Notable for the following components:    Est, Glom Filt Rate 81 (*)     All other components within normal limits   TROPONIN   BRAIN NATRIURETIC PEPTIDE   LACTATE, SEPSIS   OSMOLALITY   SCAN OF BLOOD SMEAR   BASIC METABOLIC PANEL W/ REFLEX TO MG FOR LOW K   CBC WITH AUTO DIFFERENTIAL       Radiologic studies results:  XR CHEST PORTABLE   Final Result   1. Patchy opacities seen in the right lung base and in the left lung base. Findings are concerning for infectious etiology. 2. Platelike atelectasis also seen in the left lung base. **This report has been created using voice recognition software. It may contain minor errors which are inherent in voice recognition technology. **      Final report electronically signed by Dr Deni Chacko on 10/18/2021 12:22 PM          ED Medications administered this visit:   Medications   sodium chloride flush 0.9 % injection 5-40 mL (has no administration in time range)   sodium chloride flush 0.9 % injection 5-40 mL (has no administration in time range)   0.9 % sodium chloride infusion (has no administration in time range)   enoxaparin (LOVENOX) injection 40 mg (40 mg SubCUTAneous Given 10/18/21 1845)   ondansetron (ZOFRAN-ODT) disintegrating tablet 4 mg (has no administration in time range)     Or   ondansetron (ZOFRAN) injection 4 mg (has no administration in time range)   polyethylene glycol (GLYCOLAX) packet 17 g (has no administration in time range)   dexamethasone (DECADRON) injection 6 mg (has no administration in time range)   albuterol sulfate  (90 Base) MCG/ACT inhaler 2 puff (has no administration in time range)   ascorbic acid (VITAMIN C) tablet 500 mg (500 mg Oral Given 10/18/21 1805)   Vitamin D (CHOLECALCIFEROL) tablet 1,000 Units (1,000 Units Oral Given 10/18/21 1805)   zinc sulfate (ZINCATE) capsule 50 mg (50 mg Oral Given 10/18/21 1805)   lisinopril (PRINIVIL;ZESTRIL) tablet 20 mg (has no administration in time range)   metoprolol tartrate (LOPRESSOR) tablet 50 mg (has no administration in time range)   baricitinib (OLUMIANT) tablet 4 mg (has no administration in time range)   0.9 % sodium chloride bolus (0 mLs IntraVENous Stopped 10/18/21 1739)   dexamethasone (DECADRON) injection 6 mg (6 mg IntraVENous Given 10/18/21 1339)         ED COURSE             MEDICATION CHANGES     Current Discharge Medication List            FINAL DISPOSITION     Final diagnoses:   Acute hypoxemic respiratory failure due to COVID-19 [facility])     Condition: condition: stable  Dispo: Admit to med/surg floor      This transcription was electronically signed.  Parts of this transcriptions may have been dictated by use of voice recognition software and electronically transcribed, and parts may have been transcribed with the assistance of an ED scribe. The transcription may contain errors not detected in proofreading. Please refer to my supervising physician's documentation if my documentation differs.     Electronically Signed: Daisy Stern MD, 10/18/21, 10:40 PM          Daisy Stern MD  Resident  10/18/21 9502

## 2021-10-19 VITALS
SYSTOLIC BLOOD PRESSURE: 137 MMHG | DIASTOLIC BLOOD PRESSURE: 73 MMHG | WEIGHT: 284 LBS | TEMPERATURE: 97.8 F | RESPIRATION RATE: 19 BRPM | HEART RATE: 72 BPM | OXYGEN SATURATION: 96 % | BODY MASS INDEX: 40.75 KG/M2

## 2021-10-19 LAB
ANION GAP SERPL CALCULATED.3IONS-SCNC: 8 MEQ/L (ref 8–16)
BASOPHILS # BLD: 0.4 %
BASOPHILS ABSOLUTE: 0 THOU/MM3 (ref 0–0.1)
BUN BLDV-MCNC: 15 MG/DL (ref 7–22)
CALCIUM SERPL-MCNC: 9.5 MG/DL (ref 8.5–10.5)
CHLORIDE BLD-SCNC: 100 MEQ/L (ref 98–111)
CO2: 31 MEQ/L (ref 23–33)
CREAT SERPL-MCNC: 0.9 MG/DL (ref 0.4–1.2)
EOSINOPHIL # BLD: 0 %
EOSINOPHILS ABSOLUTE: 0 THOU/MM3 (ref 0–0.4)
ERYTHROCYTE [DISTWIDTH] IN BLOOD BY AUTOMATED COUNT: 12.6 % (ref 11.5–14.5)
ERYTHROCYTE [DISTWIDTH] IN BLOOD BY AUTOMATED COUNT: 43.2 FL (ref 35–45)
GFR SERPL CREATININE-BSD FRML MDRD: 81 ML/MIN/1.73M2
GLUCOSE BLD-MCNC: 125 MG/DL (ref 70–108)
HCT VFR BLD CALC: 40.1 % (ref 42–52)
HEMOGLOBIN: 13.8 GM/DL (ref 14–18)
IMMATURE GRANS (ABS): 0.03 THOU/MM3 (ref 0–0.07)
IMMATURE GRANULOCYTES: 1.1 %
LYMPHOCYTES # BLD: 33.8 %
LYMPHOCYTES ABSOLUTE: 0.9 THOU/MM3 (ref 1–4.8)
MCH RBC QN AUTO: 32.6 PG (ref 26–33)
MCHC RBC AUTO-ENTMCNC: 34.4 GM/DL (ref 32.2–35.5)
MCV RBC AUTO: 94.8 FL (ref 80–94)
MONOCYTES # BLD: 13.5 %
MONOCYTES ABSOLUTE: 0.4 THOU/MM3 (ref 0.4–1.3)
NUCLEATED RED BLOOD CELLS: 0 /100 WBC
PLATELET # BLD: 165 THOU/MM3 (ref 130–400)
PMV BLD AUTO: 10 FL (ref 9.4–12.4)
POTASSIUM REFLEX MAGNESIUM: 5.3 MEQ/L (ref 3.5–5.2)
RBC # BLD: 4.23 MILL/MM3 (ref 4.7–6.1)
SEG NEUTROPHILS: 51.2 %
SEGMENTED NEUTROPHILS ABSOLUTE COUNT: 1.4 THOU/MM3 (ref 1.8–7.7)
SODIUM BLD-SCNC: 139 MEQ/L (ref 135–145)
WBC # BLD: 2.7 THOU/MM3 (ref 4.8–10.8)

## 2021-10-19 PROCEDURE — 2580000003 HC RX 258: Performed by: STUDENT IN AN ORGANIZED HEALTH CARE EDUCATION/TRAINING PROGRAM

## 2021-10-19 PROCEDURE — 6370000000 HC RX 637 (ALT 250 FOR IP): Performed by: STUDENT IN AN ORGANIZED HEALTH CARE EDUCATION/TRAINING PROGRAM

## 2021-10-19 PROCEDURE — 96376 TX/PRO/DX INJ SAME DRUG ADON: CPT

## 2021-10-19 PROCEDURE — 94761 N-INVAS EAR/PLS OXIMETRY MLT: CPT

## 2021-10-19 PROCEDURE — 99239 HOSP IP/OBS DSCHRG MGMT >30: CPT | Performed by: STUDENT IN AN ORGANIZED HEALTH CARE EDUCATION/TRAINING PROGRAM

## 2021-10-19 PROCEDURE — 6360000002 HC RX W HCPCS: Performed by: STUDENT IN AN ORGANIZED HEALTH CARE EDUCATION/TRAINING PROGRAM

## 2021-10-19 PROCEDURE — 80048 BASIC METABOLIC PNL TOTAL CA: CPT

## 2021-10-19 PROCEDURE — G0378 HOSPITAL OBSERVATION PER HR: HCPCS

## 2021-10-19 PROCEDURE — 36415 COLL VENOUS BLD VENIPUNCTURE: CPT

## 2021-10-19 PROCEDURE — 85025 COMPLETE CBC W/AUTO DIFF WBC: CPT

## 2021-10-19 RX ORDER — ALBUTEROL SULFATE 90 UG/1
2 AEROSOL, METERED RESPIRATORY (INHALATION) EVERY 6 HOURS PRN
Qty: 18 G | Refills: 3 | Status: SHIPPED | OUTPATIENT
Start: 2021-10-19

## 2021-10-19 RX ORDER — DEXAMETHASONE 6 MG/1
6 TABLET ORAL
Qty: 9 TABLET | Refills: 0 | Status: SHIPPED | OUTPATIENT
Start: 2021-10-19 | End: 2021-10-28

## 2021-10-19 RX ORDER — ASCORBIC ACID 500 MG
500 TABLET ORAL DAILY
Qty: 30 TABLET | Refills: 3 | Status: SHIPPED | OUTPATIENT
Start: 2021-10-20

## 2021-10-19 RX ADMIN — BARICITINIB 4 MG: 2 TABLET, FILM COATED ORAL at 08:51

## 2021-10-19 RX ADMIN — Medication 1000 UNITS: at 08:51

## 2021-10-19 RX ADMIN — LISINOPRIL 20 MG: 20 TABLET ORAL at 08:51

## 2021-10-19 RX ADMIN — Medication 50 MG: at 08:51

## 2021-10-19 RX ADMIN — DEXAMETHASONE SODIUM PHOSPHATE 6 MG: 4 INJECTION, SOLUTION INTRA-ARTICULAR; INTRALESIONAL; INTRAMUSCULAR; INTRAVENOUS; SOFT TISSUE at 08:52

## 2021-10-19 RX ADMIN — OXYCODONE HYDROCHLORIDE AND ACETAMINOPHEN 500 MG: 500 TABLET ORAL at 08:51

## 2021-10-19 RX ADMIN — METOPROLOL TARTRATE 50 MG: 50 TABLET, FILM COATED ORAL at 08:51

## 2021-10-19 RX ADMIN — SODIUM CHLORIDE, PRESERVATIVE FREE 10 ML: 5 INJECTION INTRAVENOUS at 03:54

## 2021-10-19 RX ADMIN — SODIUM CHLORIDE, PRESERVATIVE FREE 10 ML: 5 INJECTION INTRAVENOUS at 08:52

## 2021-10-19 ASSESSMENT — PAIN SCALES - GENERAL
PAINLEVEL_OUTOF10: 0
PAINLEVEL_OUTOF10: 0

## 2021-10-19 NOTE — PROGRESS NOTES
A home oxygen evaluation has been completed. [x]Patient is an inpatient. It is expected that the patient will be discharged within the next 48 hours. Qualified provider to write orders for possible sleep study or home oxygen prescription. Social service/care managers will arrange for home oxygen if ordered. If patient is active, arrange for Home Medical supplier to assess for Oxygen Conserving Device per pulse oximetry. []Patient is an outpatient. Results will be faxed to the ordering provider. Qualified provider to write orders for possible sleep study or home oxygen prescription. Patient was placed on room air . SpO2 was 97 % on room air at rest. Patients SpO2 was 89% or above and did not qualify for home oxygen. Patient was walked for 6 minutes. SpO2 was 92 % during walking. Patients SpO2 was 89% or above and did not qualify for home oxygen. Patient does not have a positive pressure airway device at home. Patient is not  diagnosed with Obstructive Sleep Apnea. Patient was not set up/instructed on a nocturnal study. Results will be given to qualified provider. Evaluation completed by Onel Rodríguez RN's.

## 2021-10-19 NOTE — DISCHARGE SUMMARY
Hospitalist Discharge Summary        Patient: Suhail Garza  YOB: 1940  MRN: [id number]   Acct: [de-identified]    Primary Care Physician: Kayla Hough DO    Admit date  10/18/2021    Discharge date:  10/19/2021 11:56 AM    Chief Complaint on presentation :-    Shortness of breath    Discharge Assessment and Plan:-   1. Acute apostle respiratory failure  2. COVID-19 pneumonia  a. Received 10 mg Decadron upon presentation. We will continue with 6 mg Decadron daily to complete a 10-day course. b. Also received baricitinib while inpatient. c. Continue vitamin C/D and zinc.  d. Prescription for as needed albuterol inhaler provided at time of discharge. e. Patient underwent home oxygen evaluation prior to discharge. He did not require any oxygen at rest or with exertion to maintain appropriate oxygen saturations. 3. Hypertension  a. Continue home medications    Initial H and P and Hospital course:-  Patient presented to hospital with complaints of worsening shortness of breath. He had been having symptoms for approximately 1 week. His wife had tested positive for COVID-19. After discussion with his outpatient PCP, this is remained to present to the hospital for evaluation. Upon arrival, the patient was noted to be mildly hypoxic and was placed on supplemental oxygen at 2 L. Received Decadron therapy as well as baricitinib. He was monitored overnight and the following morning had appropriate oxygen saturations on minimal oxygen via nasal cannula. He underwent home oxygen evaluation and was able to maintain appropriate oxygen saturations on room air with both rest and exertion.     Physical Exam:-  Vitals:   Patient Vitals for the past 24 hrs:   BP Temp Temp src Pulse Resp SpO2   10/19/21 1137 (!) 126/51 97.5 °F (36.4 °C) Oral 63 18 92 %   10/19/21 0855 -- -- -- -- -- 92 %   10/19/21 0845 138/68 98 °F (36.7 °C) Oral 65 18 98 %   10/19/21 0335 (!) 141/87 97.2 °F (36.2 °C) Oral 77 20 96 %   10/19/21 0057 (!) 143/90 -- -- 74 -- --   10/19/21 0000 (!) 143/90 97 °F (36.1 °C) Oral 74 20 95 %   10/18/21 2120 (!) 150/94 98.7 °F (37.1 °C) Oral 80 22 91 %   10/18/21 1847 (!) 162/89 -- -- 74 26 95 %   10/18/21 1741 (!) 181/93 -- -- 71 21 94 %   10/18/21 1604 -- -- -- 65 22 96 %   10/18/21 1458 -- -- -- 66 21 96 %   10/18/21 1341 -- -- -- 61 18 96 %   10/18/21 1226 120/77 -- -- 65 17 96 %     Weight:   Weight: 284 lb (128.8 kg)   24 hour intake/output:     Intake/Output Summary (Last 24 hours) at 10/19/2021 1156  Last data filed at 10/19/2021 0354  Gross per 24 hour   Intake 210 ml   Output --   Net 210 ml       General appearance: No apparent distress, appears stated age and cooperative. HEENT: Normal cephalic, atraumatic without obvious deformity. Pupils equal, round, and reactive to light. Extra ocular muscles intact. Conjunctivae/corneas clear. Neck: Supple, with full range of motion. No jugular venous distention. Trachea midline. Respiratory:  Normal respiratory effort. Clear to auscultation, bilaterally without Rales/Wheezes/Rhonchi. Cardiovascular: Regular rate and rhythm with normal S1/S2 without murmurs, rubs or gallops. Abdomen: Soft, non-tender, non-distended with normal bowel sounds. Musculoskeletal:  No clubbing, cyanosis or edema bilaterally. Skin: Skin color, texture, turgor normal.  No rashes or lesions. Neurologic:  Neurovascularly intact without any focal sensory/motor deficits.  Cranial nerves: II-XII intact, grossly non-focal.  Psychiatric: Alert and oriented, thought content appropriate, normal insight  Capillary Refill: Brisk,< 3 seconds Peripheral Pulses: +2 palpable, equal bilaterally        Discharge Medications:-      Medication List      START taking these medications    albuterol sulfate  (90 Base) MCG/ACT inhaler  Inhale 2 puffs into the lungs every 6 hours as needed for Wheezing or Shortness of Breath     ascorbic acid 500 MG tablet  Commonly known as: VITAMIN C  Take 1 tablet by mouth daily  Start taking on: October 20, 2021     dexamethasone 6 MG tablet  Commonly known as: Decadron  Take 1 tablet by mouth daily (with breakfast) for 9 days        CONTINUE taking these medications    Arginine 500 MG Caps     docusate sodium 100 MG capsule  Commonly known as: COLACE     GLUCOSAMINE CHONDROITIN MSM PO     lisinopril 20 MG tablet  Commonly known as: PRINIVIL;ZESTRIL  Take 1 tablet by mouth 2 times daily     Lysine 500 MG Caps     metoprolol tartrate 50 MG tablet  Commonly known as: LOPRESSOR  Take 1 tablet by mouth 2 times daily     multivitamin per tablet     Olive Leaf 250 MG Caps     VITAMIN B COMPLEX PO     VITAMIN D-3 PO     ZINC PO        STOP taking these medications    ibuprofen 200 MG tablet  Commonly known as: ADVIL;MOTRIN           Where to Get Your Medications      These medications were sent to [id number] - [place], [place] - [address] [facility] [facility] [address] - P [phone] - F [phone]  . Hilario Rowe [address] [place] [id number]    Phone: [phone]   · albuterol sulfate  (90 Base) MCG/ACT inhaler  · ascorbic acid 500 MG tablet  · dexamethasone 6 MG tablet          Labs :-  Recent Results (from the past 72 hour(s))   EKG Emergency    Collection Time: 10/18/21 11:04 AM   Result Value Ref Range    Ventricular Rate 69 BPM    Atrial Rate 69 BPM    P-R Interval 206 ms    QRS Duration 106 ms    Q-T Interval 368 ms    QTc Calculation (Bazett) 394 ms    P Axis 60 degrees    R Axis -21 degrees    T Axis 56 degrees   CBC Auto Differential    Collection Time: 10/18/21 12:02 PM   Result Value Ref Range    WBC 2.8 (L) 4.8 - 10.8 thou/mm3    RBC 4.02 (L) 4.70 - 6.10 mill/mm3    Hemoglobin 13.0 (L) 14.0 - 18.0 gm/dl    Hematocrit 39.1 (L) 42.0 - 52.0 %    MCV 97.3 (H) 80.0 - 94.0 fL    MCH 32.3 26.0 - 33.0 pg    MCHC 33.2 32.2 - 35.5 gm/dl    RDW-CV 13.1 11.5 - 14.5 %    RDW-SD 46.5 (H) 35.0 - 45.0 fL    Platelets 750 994 - 389 thou/mm3    MPV 10.0 9.4 - 12.4 fL    Seg Neutrophils 51.7 %    Lymphocytes 31.1 %    Monocytes 15.7 %    Eosinophils 0.4 %    Basophils 0.4 %    Immature Granulocytes 0.7 %    Segs Absolute 1.4 (L) 1 - 7 thou/mm3    Lymphocytes Absolute 0.9 (L) 1.0 - 4.8 thou/mm3    Monocytes Absolute 0.4 0.4 - 1.3 thou/mm3    Eosinophils Absolute 0.0 0.0 - 0.4 thou/mm3    Basophils Absolute 0.0 0.0 - 0.1 thou/mm3    Immature Grans (Abs) 0.02 0.00 - 0.07 thou/mm3    nRBC 0 /100 wbc   Basic Metabolic Panel w/ Reflex to MG    Collection Time: 10/18/21 12:02 PM   Result Value Ref Range    Sodium 138 135 - 145 meq/L    Potassium reflex Magnesium 4.5 3.5 - 5.2 meq/L    Chloride 101 98 - 111 meq/L    CO2 30 23 - 33 meq/L    Glucose 110 (H) 70 - 108 mg/dL    BUN 15 7 - 22 mg/dL    CREATININE 0.9 0.4 - 1.2 mg/dL    Calcium 8.9 8.5 - 10.5 mg/dL   Hepatic Function Panel    Collection Time: 10/18/21 12:02 PM   Result Value Ref Range    Albumin 3.6 3.5 - 5.1 g/dL    Total Bilirubin 0.5 0.3 - 1.2 mg/dL    Bilirubin, Direct <0.2 0.0 - 0.3 mg/dL    Alkaline Phosphatase 58 38 - 126 U/L    AST 49 (H) 5 - 40 U/L    ALT 36 11 - 66 U/L    Total Protein 6.3 6.1 - 8.0 g/dL   Troponin    Collection Time: 10/18/21 12:02 PM   Result Value Ref Range    Troponin T < 0.010 ng/ml   Brain Natriuretic Peptide    Collection Time: 10/18/21 12:02 PM   Result Value Ref Range    Pro-.5 0.0 - 1800.0 pg/mL   Lactate, Sepsis    Collection Time: 10/18/21 12:02 PM   Result Value Ref Range    Lactic Acid, Sepsis 1.5 0.5 - 1.9 mmol/L   Ferritin    Collection Time: 10/18/21 12:02 PM   Result Value Ref Range    Ferritin 1,241 (H) 22 - 322 ng/mL   C-reactive protein    Collection Time: 10/18/21 12:02 PM   Result Value Ref Range    CRP 2.50 (H) 0.00 - 1.00 mg/dl   Procalcitonin    Collection Time: 10/18/21 12:02 PM   Result Value Ref Range    Procalcitonin 0.10 (H) 0.01 - 0.09 ng/mL   Anion Gap    Collection Time: 10/18/21 12:02 PM   Result Value Ref Range    Anion Gap 7.0 (L) 8.0 - 16.0 meq/L   Osmolality Collection Time: 10/18/21 12:02 PM   Result Value Ref Range    Osmolality Calc 277.1 275.0 - 300.0 mOsmol/kg   Glomerular Filtration Rate, Estimated    Collection Time: 10/18/21 12:02 PM   Result Value Ref Range    Est Glojeffrey Filt Rate 81 (A) ml/min/1.73m2   Scan of Blood Smear    Collection Time: 10/18/21 12:02 PM   Result Value Ref Range    SCAN OF BLOOD SMEAR see below    COVID-19, Rapid    Collection Time: 10/18/21 12:30 PM    Specimen: Nasopharyngeal Swab   Result Value Ref Range    SARS-CoV-2, NAAT DETECTED (AA) NOT DETECTED   Basic Metabolic Panel w/ Reflex to MG    Collection Time: 10/19/21  8:48 AM   Result Value Ref Range    Sodium 139 135 - 145 meq/L    Potassium reflex Magnesium 5.3 (H) 3.5 - 5.2 meq/L    Chloride 100 98 - 111 meq/L    CO2 31 23 - 33 meq/L    Glucose 125 (H) 70 - 108 mg/dL    BUN 15 7 - 22 mg/dL    CREATININE 0.9 0.4 - 1.2 mg/dL    Calcium 9.5 8.5 - 10.5 mg/dL   CBC auto differential    Collection Time: 10/19/21  8:48 AM   Result Value Ref Range    WBC 2.7 (L) 4.8 - 10.8 thou/mm3    RBC 4.23 (L) 4.70 - 6.10 mill/mm3    Hemoglobin 13.8 (L) 14.0 - 18.0 gm/dl    Hematocrit 40.1 (L) 42.0 - 52.0 %    MCV 94.8 (H) 80.0 - 94.0 fL    MCH 32.6 26.0 - 33.0 pg    MCHC 34.4 32.2 - 35.5 gm/dl    RDW-CV 12.6 11.5 - 14.5 %    RDW-SD 43.2 35.0 - 45.0 fL    Platelets 561 526 - 585 thou/mm3    MPV 10.0 9.4 - 12.4 fL    Seg Neutrophils 51.2 %    Lymphocytes 33.8 %    Monocytes 13.5 %    Eosinophils 0.0 %    Basophils 0.4 %    Immature Granulocytes 1.1 %    Segs Absolute 1.4 (L) 1 - 7 thou/mm3    Lymphocytes Absolute 0.9 (L) 1.0 - 4.8 thou/mm3    Monocytes Absolute 0.4 0.4 - 1.3 thou/mm3    Eosinophils Absolute 0.0 0.0 - 0.4 thou/mm3    Basophils Absolute 0.0 0.0 - 0.1 thou/mm3    Immature Grans (Abs) 0.03 0.00 - 0.07 thou/mm3    nRBC 0 /100 wbc   Anion Gap    Collection Time: 10/19/21  8:48 AM   Result Value Ref Range    Anion Gap 8.0 8.0 - 16.0 meq/L   Glomerular Filtration Rate, Estimated    Collection Time: 10/19/21  8:48 AM   Result Value Ref Range    Est, Glom Filt Rate 81 (A) ml/min/1.73m2        Microbiology:    Blood culture #1:   Lab Results   Component Value Date    BC No growth-preliminary  No growth   08/25/2014       Blood culture #2:No results found for: Samir Dixon    Organism:  No results found for: LABGRAM    MRSA culture only:No results found for: [facility]    Urine culture:   Lab Results   Component Value Date    LABURIN  08/24/2014     No growth-preliminary  Growth of Contaminants. The mixture of organisms present  are not a common cause of urinary tract infections and  probably represent skin isidro or distal urethral isidro. Lab Results   Component Value Date    ORG Growth of Contaminants 08/24/2014        Respiratory culture: No results found for: CULTRESP    Aerobic and Anaerobic :  No results found for: LABAERO  No results found for: LABANAE    Urinalysis:      Lab Results   Component Value Date    NITRU NEGATIVE 08/24/2014    WBCUA 2 08/24/2014    BACTERIA NONE 08/24/2014    RBCUA 3 08/24/2014    BLOODU NEGATIVE 08/24/2014    SPECGRAV >1.030 08/24/2014       Radiology:-  XR CHEST PORTABLE    Result Date: 10/18/2021  PROCEDURE: XR CHEST PORTABLE CLINICAL INFORMATION: SOB, COVID COMPARISON: Chest radiograph 8/26/2014 TECHNIQUE: AP portable chest radiograph performed. FINDINGS: Patchy opacities seen in the right lung base. Patchy opacity and streaky opacities seen in the left lung base. Cardiac silhouette is mildly enlarged. Hyperinflation of the lungs may suggest underlying chronic lung disease or emphysematous changes. No pleural effusion. No pneumothorax. No acute bony abnormality. 1. Patchy opacities seen in the right lung base and in the left lung base. Findings are concerning for infectious etiology. 2. Platelike atelectasis also seen in the left lung base. **This report has been created using voice recognition software.   It may contain minor errors which are inherent in voice recognition technology. ** Final report electronically signed by Dr Reyes Panning on 10/18/2021 12:22 PM       Follow-up scheduled after discharge :-    in 5-7 days with Charline Cramer DO    Consultations during this hospital stay:-  [] NONE [] Cardiology  [] Nephrology  [] Hemo onco   [] GI   [] ID  [] Endocrine  [] Pulm    [] Neuro    [] Psych   [] Urology  [] ENT   [] G SURGERY   []Ortho    []CV surg    [] Palliative  [] Hospice [] Pain management   []    []TCU   [] PT/OT  OTHERS:-    Disposition: home  Condition at Discharge: Stable    Time Spent:- 35 minutes    Electronically signed by Karina Coughlin DO on 10/19/21 at 11:56 AM EDT   Discharging Hospitalist

## 2021-10-19 NOTE — PROGRESS NOTES
Patient educated on discharge instructions and new medications. No questions or concerns voiced at this time.

## 2021-10-19 NOTE — ED NOTES
Patient transferred to 8B room 030 nurse informed prior to leaving the floor.       Hermilo Arroyochment  10/18/21 2023

## 2021-10-19 NOTE — ED NOTES
Pt updated with inpatient bed status. Denies needs at this time. Currently doing incentive spirometer. Will continue to monitor.      Speedy Gonzalez RN  10/18/21 2004

## 2021-10-20 ENCOUNTER — TELEPHONE (OUTPATIENT)
Dept: FAMILY MEDICINE CLINIC | Age: 81
End: 2021-10-20

## 2021-10-20 ENCOUNTER — CARE COORDINATION (OUTPATIENT)
Dept: CASE MANAGEMENT | Age: 81
End: 2021-10-20

## 2021-10-20 NOTE — CARE COORDINATION
Covid-19 Initial Follow Up Call    Patient contacted regarding COVID-19 risk, exposure and diagnosis. Discussed COVID-19 related testing which was available at this time. Test results were positive. Patient informed of results, if available? Yes. Care Transition Nurse contacted the patient by telephone to perform post discharge assessment. Call within 2 business days of discharge: Yes. Verified name and  with patient as identifiers. Provided introduction to self, and explanation of the CTN/ACM role, and reason for call due to risk factors for infection and/or exposure to COVID-19. Spoke with Julia Worley after speaking with his wife (both discharged with Covid), said he is wonderful. Slept well. Has a productive cough and is determined to get the pneumonia out of him. Is using the IS several times a day. Denies other Covid symptoms. Appetite and fluid intake is good. Is aware of f/u appt. No other questions or concerns at this time. Will continue to follow. Symptoms reviewed with patient who verbalized the following symptoms: no new symptoms and no worsening symptoms. Due to no new or worsening symptoms encounter was not routed to provider for escalation. Discussed follow-up appointments. If no appointment was previously scheduled, appointment scheduling offered: na. [facility] follow up appointment(s):   Future Appointments   Date Time Provider Huber French   10/25/2021  9:30 AM Melissa Marshall DO [address] [facility]   2022  2:00 PM Melissa Marshall DO [address] [facility]     Non-[facility] follow up appointment(s): fidelina    Non-face-to-face services provided:  Scheduled appointment with PCP-10/25  Obtained and reviewed discharge summary and/or continuity of care documents     Advance Care Planning:   Does patient have an Advance Directive:  reviewed and current. Educated patient about risk for severe COVID-19 due to risk factors according to CDC guidelines.  CTN reviewed discharge instructions, medical action plan and red flag symptoms with the patient who verbalized understanding. Discussed COVID vaccination status: Yes. Education provided on COVID-19 vaccination as appropriate. Discussed exposure protocols and quarantine with CDC Guidelines. Patient was given an opportunity to verbalize any questions and concerns and agrees to contact CTN or health care provider for questions related to their healthcare. Reviewed and educated patient on any new and changed medications related to discharge diagnosis     Was patient discharged with a pulse oximeter? No, has at home. Discussed and confirmed pulse oximeter discharge instructions and when to notify provider or seek emergency care. CTN provided contact information. Plan for follow-up call in 5-7 days based on severity of symptoms and risk factors.

## 2021-10-20 NOTE — TELEPHONE ENCOUNTER
[facility] Transitions Initial Follow Up Call    Outreach made within 2 business days of discharge: Yes    Patient: David Sommers Patient : 1940   MRN: [id number]  Reason for Admission: There are no discharge diagnoses documented for the most recent discharge. Discharge Date: 10/19/21       Spoke with: Daughter BODØ on HIPAA    Discharge department/facility: [facility]    TCM Interactive Patient Contact:  Was patient able to fill all prescriptions: Yes  Was patient instructed to bring all medications to the follow-up visit: Yes  Is patient taking all medications as directed in the discharge summary?  Yes  Does patient understand their discharge instructions: Yes  Does patient have questions or concerns that need addressed prior to 7-14 day follow up office visit: no    Scheduled appointment with PCP within 7-14 days    Follow Up  Future Appointments   Date Time Provider Huber French   10/25/2021  9:30 AM Trudee Cabot, [address]   2022  2:00 PM Trudee Cabot, DO [address] [place] [address] ([address] [place] [address])

## 2021-10-25 ENCOUNTER — VIRTUAL VISIT (OUTPATIENT)
Dept: FAMILY MEDICINE CLINIC | Age: 81
End: 2021-10-25
Payer: MEDICARE

## 2021-10-25 DIAGNOSIS — U07.1 ACUTE RESPIRATORY FAILURE DUE TO COVID-19 (HCC): ICD-10-CM

## 2021-10-25 DIAGNOSIS — J96.00 ACUTE RESPIRATORY FAILURE DUE TO COVID-19 (HCC): ICD-10-CM

## 2021-10-25 DIAGNOSIS — I10 ESSENTIAL HYPERTENSION: ICD-10-CM

## 2021-10-25 DIAGNOSIS — J12.82 PNEUMONIA DUE TO COVID-19 VIRUS: Primary | ICD-10-CM

## 2021-10-25 DIAGNOSIS — E66.01 MORBIDLY OBESE (HCC): ICD-10-CM

## 2021-10-25 DIAGNOSIS — U07.1 PNEUMONIA DUE TO COVID-19 VIRUS: Primary | ICD-10-CM

## 2021-10-25 DIAGNOSIS — I25.83 CORONARY ARTERY DISEASE DUE TO LIPID RICH PLAQUE: ICD-10-CM

## 2021-10-25 DIAGNOSIS — R00.0 WIDE-COMPLEX TACHYCARDIA: ICD-10-CM

## 2021-10-25 DIAGNOSIS — I25.10 CORONARY ARTERY DISEASE DUE TO LIPID RICH PLAQUE: ICD-10-CM

## 2021-10-25 PROCEDURE — 99496 TRANSJ CARE MGMT HIGH F2F 7D: CPT | Performed by: FAMILY MEDICINE

## 2021-10-25 PROCEDURE — 1111F DSCHRG MED/CURRENT MED MERGE: CPT | Performed by: FAMILY MEDICINE

## 2021-10-25 NOTE — PROGRESS NOTES
Post-Discharge Transitional Care Management Services      Freddy Barber   YOB: 1940    Date of Visit:  10/25/2021  30 Day Post-Discharge Date: 11/19/21    Admit date  10/18/2021                       Discharge date:  10/19/2021 11:56 AM     Chief Complaint on presentation :-    Shortness of breath     Discharge Assessment and Plan:-   1. Acute apostle respiratory failure  2. COVID-19 pneumonia  a. Received 10 mg Decadron upon presentation. We will continue with 6 mg Decadron daily to complete a 10-day course. b. Also received baricitinib while inpatient. c. Continue vitamin C/D and zinc.  d. Prescription for as needed albuterol inhaler provided at time of discharge. e. Patient underwent home oxygen evaluation prior to discharge. He did not require any oxygen at rest or with exertion to maintain appropriate oxygen saturations. 3. Hypertension  a. Continue home medications     Initial H and P and Hospital course:-  Patient presented to hospital with complaints of worsening shortness of breath. He had been having symptoms for approximately 1 week. His wife had tested positive for COVID-19. After discussion with his outpatient PCP, this is remained to present to the hospital for evaluation.     Upon arrival, the patient was noted to be mildly hypoxic and was placed on supplemental oxygen at 2 L. Received Decadron therapy as well as baricitinib. He was monitored overnight and the following morning had appropriate oxygen saturations on minimal oxygen via nasal cannula.     He underwent home oxygen evaluation and was able to maintain appropriate oxygen saturations on room air with both rest and exertion. Doing well. Still with mild fatigue and cough. No fevers. Denies chest tightness or wheezing at this time. Will finish out Telogis. Albuterol prn, has not needed much over the last several days.       Allergies   Allergen Reactions    Dilaudid [Hydromorphone Hcl] Shortness Of Breath and Swelling    Hydrocodone-Acetaminophen Other (See Comments)     Visual halucinations. No outpatient medications have been marked as taking for the 10/25/21 encounter (Appointment) with Dante Aaron DO. There were no vitals filed for this visit. There is no height or weight on file to calculate BMI. Wt Readings from Last 3 Encounters:   10/18/21 284 lb (128.8 kg)   07/23/21 284 lb (128.8 kg)   05/18/21 285 lb 6.4 oz (129.5 kg)     BP Readings from Last 3 Encounters:   10/19/21 137/73   07/23/21 126/76   05/18/21 118/78        Patient was admitted to [address] [place] [address] from 10/18/21 to 10/19/21 for COVID pneumonia. Inpatient course: Discharge summary reviewed- see chart. Current status: improved    Review of Systems:  A comprehensive review of systems was negative except for what was noted in the HPI. Physical Exam:  General Appearance: alert and oriented to person, place and time, well developed and well- nourished, in no acute distress  Head: normocephalic and atraumatic  Neck: supple   Pulmonary/Chest: no respiratory distress  Neurologic: speech normal    Initial post-discharge communication occurred between medical assistant and caregiver- daughter on 10/20/21- see documentation in chart: telephone encounter.     Assessment/Plan:  Diagnoses and all orders for this visit:    Pneumonia due to COVID-19 virus    Acute respiratory failure due to COVID-19 [facility])    Essential hypertension    Coronary artery disease due to lipid rich plaque    Morbidly obese (HCC)    Wide-complex tachycardia ([address]    -  Pt is doing well, recovering nicely  -  Chronic medical problems otherwise stable  -  Continue current medications  -  Follow up with specialists as scheduled  -  RTO prn    Diagnostic test results reviewed: inpatient labs, EKG and chest x-ray    Patient risk of morbidity and mortality: high    Medical Decision Making: high complexity

## 2021-10-27 ENCOUNTER — CARE COORDINATION (OUTPATIENT)
Dept: CASE MANAGEMENT | Age: 81
End: 2021-10-27

## 2021-10-27 NOTE — CARE COORDINATION
Covid-19 Subsequent Follow Up Call    Patient contacted regarding COVID-19 risk, exposure and diagnosis. Discussed COVID-19 related testing which was available at this time. Test results were positive. Patient informed of results, if available? Yes    Care Transition Nurse contacted the family by telephone to perform follow-up assessment. Verified name and  with family as identifiers. Patient has following risk factors of: no known risk factors and Covid. Gabo's wife returned call for her follow up and said that Clemente Narayan is doing well. His O2 sat was 95% on RA. His cough is getting better. Continues to use IS several times a day. Saw PCP couple days ago and went well. Appetite and fluid intake continues to be good. He is pacing himself, resting when needed. No other questions or concerns at this time. Will continue to follow. Symptoms reviewed with family who verbalized the following symptoms: no new symptoms and no worsening symptoms. Due to no new or worsening symptoms encounter was not routed to provider for escalation. Educated patient about risk for severe COVID-19 due to risk factors according to CDC guidelines. CTN reviewed discharge instructions, medical action plan and red flag symptoms with the family who verbalized understanding. Discussed COVID vaccination status: Yes. Education provided on COVID-19 vaccination as appropriate. Discussed exposure protocols and quarantine with CDC Guidelines. Family was given an opportunity to verbalize any questions and concerns and agrees to contact CTN or health care provider for questions related to their healthcare. Was patient discharged with a pulse oximeter? No, has at home. Discussed and confirmed pulse oximeter discharge instructions and when to notify provider or seek emergency care. CTN provided contact information. Plan for follow-up call in 5-7 days based on severity of symptoms and risk factors.

## 2021-11-03 ENCOUNTER — CARE COORDINATION (OUTPATIENT)
Dept: CASE MANAGEMENT | Age: 81
End: 2021-11-03

## 2021-11-03 NOTE — CARE COORDINATION
[facility]   Covid Call    11/3/2021    Patient: Miguel Ángel Blevins  Patient : 1940   MRN: [id number]  Reason for Admission: Covid  Discharge Date: 10/19/21 RARS: Readmission Risk Score: 11    COVID Follow Up Call:     Patient states he is doing very well. Denies, Chest pain, SOB, fever & chills, loss of taste or smell, extreme fatigue, dizziness, N/V/D, Pain and body aches, confusion. Patient confirms all medications are available and are being taken as directed. Patient has no needs or concerns for writer at this time. Instructed patient that this is the Final Transition Call and to call their Specialist/PCP for any problems or issues that may occur. Expresses understanding. Isaías Davidson LPN    [phone]  Yuko Yun / Brandy  Coordinator        LPN Care Coordinator contacted the patient by telephone to perform follow-up assessment. Verified name and  with patient as identifiers. Patient has following risk factors of: pneumonia. Symptoms reviewed with patient who verbalized the following symptoms: no new symptoms and no worsening symptoms. Due to no new or worsening symptoms encounter was not routed to provider for escalation. Educated patient about risk for severe COVID-19 due to risk factors according to CDC guidelines. LPN CC reviewed discharge instructions, medical action plan and red flag symptoms with the patient who verbalized understanding. Discussed COVID vaccination status: No. Education provided on COVID-19 vaccination as appropriate. Discussed exposure protocols and quarantine with CDC Guidelines. Patient was given an opportunity to verbalize any questions and concerns and agrees to contact LPN CC or health care provider for questions related to their healthcare. Was patient discharged with a pulse oximeter?  Yes Discussed and confirmed pulse oximeter discharge instructions and when to notify provider or seek emergency care. LPN CC provided contact information. No further follow-up call identified based on severity of symptoms and risk factors. Care Transitions Subsequent and Final Call    Subsequent and Final Calls  Do you have any ongoing symptoms?: No  Have your medications changed?: No  Do you have any questions related to your medications?: No  Do you currently have any active services?: No  Do you have any needs or concerns that I can assist you with?: No  Identified Barriers: None  Care Transitions Interventions  Other Interventions:            Follow Up  Future Appointments   Date Time Provider Huber French   5/26/2022  2:00 PM Kristina Garsia DO [address] [place] [address]

## 2021-12-28 DIAGNOSIS — I10 ESSENTIAL HYPERTENSION: ICD-10-CM

## 2021-12-28 RX ORDER — METOPROLOL TARTRATE 50 MG/1
50 TABLET, FILM COATED ORAL 2 TIMES DAILY
Qty: 180 TABLET | Refills: 3 | Status: SHIPPED | OUTPATIENT
Start: 2021-12-28

## 2021-12-28 RX ORDER — LISINOPRIL 20 MG/1
20 TABLET ORAL 2 TIMES DAILY
Qty: 180 TABLET | Refills: 3 | Status: SHIPPED | OUTPATIENT
Start: 2021-12-28

## 2022-05-26 ENCOUNTER — OFFICE VISIT (OUTPATIENT)
Dept: FAMILY MEDICINE CLINIC | Age: 82
End: 2022-05-26
Payer: MEDICARE

## 2022-05-26 VITALS
HEART RATE: 72 BPM | HEIGHT: 70 IN | DIASTOLIC BLOOD PRESSURE: 76 MMHG | SYSTOLIC BLOOD PRESSURE: 124 MMHG | BODY MASS INDEX: 40.5 KG/M2 | RESPIRATION RATE: 16 BRPM | WEIGHT: 282.9 LBS

## 2022-05-26 DIAGNOSIS — E66.01 MORBIDLY OBESE (HCC): ICD-10-CM

## 2022-05-26 DIAGNOSIS — D35.2 PITUITARY MACROADENOMA (HCC): ICD-10-CM

## 2022-05-26 DIAGNOSIS — E22.0 ACROMEGALY (HCC): ICD-10-CM

## 2022-05-26 DIAGNOSIS — E78.2 MIXED HYPERLIPIDEMIA: ICD-10-CM

## 2022-05-26 DIAGNOSIS — R00.0 WIDE-COMPLEX TACHYCARDIA: ICD-10-CM

## 2022-05-26 DIAGNOSIS — Z00.00 MEDICARE ANNUAL WELLNESS VISIT, SUBSEQUENT: Primary | ICD-10-CM

## 2022-05-26 DIAGNOSIS — R73.01 IFG (IMPAIRED FASTING GLUCOSE): ICD-10-CM

## 2022-05-26 DIAGNOSIS — I10 ESSENTIAL HYPERTENSION: ICD-10-CM

## 2022-05-26 DIAGNOSIS — I25.10 CORONARY ARTERY DISEASE DUE TO LIPID RICH PLAQUE: ICD-10-CM

## 2022-05-26 DIAGNOSIS — I25.83 CORONARY ARTERY DISEASE DUE TO LIPID RICH PLAQUE: ICD-10-CM

## 2022-05-26 PROCEDURE — 1123F ACP DISCUSS/DSCN MKR DOCD: CPT | Performed by: FAMILY MEDICINE

## 2022-05-26 PROCEDURE — G0439 PPPS, SUBSEQ VISIT: HCPCS | Performed by: FAMILY MEDICINE

## 2022-05-26 SDOH — ECONOMIC STABILITY: FOOD INSECURITY: WITHIN THE PAST 12 MONTHS, YOU WORRIED THAT YOUR FOOD WOULD RUN OUT BEFORE YOU GOT MONEY TO BUY MORE.: NEVER TRUE

## 2022-05-26 SDOH — ECONOMIC STABILITY: FOOD INSECURITY: WITHIN THE PAST 12 MONTHS, THE FOOD YOU BOUGHT JUST DIDN'T LAST AND YOU DIDN'T HAVE MONEY TO GET MORE.: NEVER TRUE

## 2022-05-26 ASSESSMENT — PATIENT HEALTH QUESTIONNAIRE - PHQ9
SUM OF ALL RESPONSES TO PHQ QUESTIONS 1-9: 0
1. LITTLE INTEREST OR PLEASURE IN DOING THINGS: 0
SUM OF ALL RESPONSES TO PHQ QUESTIONS 1-9: 0
SUM OF ALL RESPONSES TO PHQ QUESTIONS 1-9: 0
2. FEELING DOWN, DEPRESSED OR HOPELESS: 0
SUM OF ALL RESPONSES TO PHQ QUESTIONS 1-9: 0
SUM OF ALL RESPONSES TO PHQ9 QUESTIONS 1 & 2: 0

## 2022-05-26 ASSESSMENT — ENCOUNTER SYMPTOMS
GASTROINTESTINAL NEGATIVE: 1
RESPIRATORY NEGATIVE: 1

## 2022-05-26 ASSESSMENT — LIFESTYLE VARIABLES: HOW OFTEN DO YOU HAVE A DRINK CONTAINING ALCOHOL: NEVER

## 2022-05-26 ASSESSMENT — SOCIAL DETERMINANTS OF HEALTH (SDOH): HOW HARD IS IT FOR YOU TO PAY FOR THE VERY BASICS LIKE FOOD, HOUSING, MEDICAL CARE, AND HEATING?: NOT HARD AT ALL

## 2022-05-26 NOTE — PATIENT INSTRUCTIONS
You may receive a survey about your visit with us today. The feedback from our patients helps us identify what is working well and where the service to all patients can be enhanced. Thank you! Personalized Preventive Plan for Makayla Tyler - 5/26/2022  Medicare offers a range of preventive health benefits. Some of the tests and screenings are paid in full while other may be subject to a deductible, co-insurance, and/or copay. Some of these benefits include a comprehensive review of your medical history including lifestyle, illnesses that may run in your family, and various assessments and screenings as appropriate. After reviewing your medical record and screening and assessments performed today your provider may have ordered immunizations, labs, imaging, and/or referrals for you. A list of these orders (if applicable) as well as your Preventive Care list are included within your After Visit Summary for your review. Other Preventive Recommendations:    · A preventive eye exam performed by an eye specialist is recommended every 1-2 years to screen for glaucoma; cataracts, macular degeneration, and other eye disorders. · A preventive dental visit is recommended every 6 months. · Try to get at least 150 minutes of exercise per week or 10,000 steps per day on a pedometer . · Order or download the FREE \"Exercise & Physical Activity: Your Everyday Guide\" from [employer]. Call 0-391.686.4433 or search [employer] online. · You need 6658-8279 mg of calcium and 6804-5042 IU of vitamin D per day. It is possible to meet your calcium requirement with diet alone, but a vitamin D supplement is usually necessary to meet this goal.  · When exposed to the sun, use a sunscreen that protects against both UVA and UVB radiation with an SPF of 30 or greater. Reapply every 2 to 3 hours or after sweating, drying off with a towel, or swimming.   · Always wear a seat belt when

## 2022-05-26 NOTE — PROGRESS NOTES
2022    Mo Grider (:  1940) is a 79 y.o. male, here for a preventive medicine evaluation. Chief Complaint   Patient presents with   [employer] Medicare AWV     AWV. Doing well overall. BPs and weight stable. BP Readings from Last 3 Encounters:   22 124/76   10/19/21 137/73   21 126/76     Wt Readings from Last 3 Encounters:   22 282 lb 14.4 oz (128.3 kg)   10/18/21 284 lb (128.8 kg)   21 284 lb (128.8 kg)     COVID last fall, no residual symptoms. 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 Last MRI in 2017. No progression of his acromegaly. Has not had labs or MRI for a few years. Denies vision changes.     Hx of CAD per chart. Last stress and echo ok in 2017. No longer seeing Cardio. 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  Medication monitoring encounter    S/P laparoscopic cholecystectomy    ED (erectile dysfunction)    Pituitary mass (HCC) follows at [facility]    Wide-complex tachycardia ([address]    near syncope  dizziness    Generalized headache new today  vertex    Frequent PVCs    Ataxic gait    Accelerated hypertension    Testicular pain, left    Episodic lightheadedness, not positional    Cervical muscle pain    Pneumonia due to COVID-19 virus       Review of Systems   Constitutional: Negative. HENT: Negative. Respiratory: Negative. Cardiovascular: Negative. Gastrointestinal: Negative. Musculoskeletal: Negative. All other systems reviewed and are negative. Prior to Visit Medications    Medication Sig Taking?  Authorizing Provider   lisinopril (PRINIVIL;ZESTRIL) 20 MG tablet Take 1 tablet by mouth 2 times daily Yes KILEY Jimenez CNP   metoprolol tartrate (LOPRESSOR) 50 MG tablet Take 1 tablet by mouth 2 times daily Yes Joe Ya APRN - CNP   ascorbic acid (VITAMIN C) 500 MG tablet Take 1 tablet by mouth daily Yes Marie Barnett DO   Cholecalciferol (VITAMIN D-3 PO) Take by mouth Yes Historical Provider, MD   ZINC PO Take by mouth Yes Historical Provider, MD   Misc Natural Products (GLUCOSAMINE CHONDROITIN MSM PO) Take by mouth Yes Historical Provider, MD   docusate sodium (COLACE) 100 MG capsule Take 100 mg by mouth every evening  Yes Historical Provider, MD   multivitamin (THERAGRAN) per tablet Take 1 tablet by mouth daily  Yes Historical Provider, MD   Lysine 500 MG CAPS Take 500 mg by mouth. 2 daily    Yes Historical Provider, MD   Arginine 500 MG CAPS Take 1,000 mg by mouth daily  Yes Historical Provider, MD   [place] 250 MG CAPS Take 250 mg by mouth Once or twice daily Yes Historical Provider, MD   B Complex Vitamins (VITAMIN B COMPLEX PO) Take  by mouth. Twice weekly  Yes Historical Provider, MD   albuterol sulfate  (90 Base) MCG/ACT inhaler Inhale 2 puffs into the lungs every 6 hours as needed for Wheezing or Shortness of Breath  Patient not taking: Reported on 5/26/2022  Marie Barnett DO        Allergies   Allergen Reactions    Dilaudid [Hydromorphone Hcl] Shortness Of Breath and Swelling    Hydrocodone-Acetaminophen Other (See Comments)     Visual halucinations.        Past Medical History:   Diagnosis Date    Adenomatous colon polyp 2011    Blood transfusion reaction     Erectile dysfunction     History of blood transfusion     Hyperlipidemia 2005    Hypertension 2008    Other disorders of kidney and ureter in diseases classified elsewhere     Pituitary macroadenoma ([facility] 2009    Pneumonia due to COVID-19 virus 10/18/2021    Zoster 2009       Past Surgical History:   Procedure Laterality Date    ABDOMEN SURGERY  unsure    CARDIAC CATHETERIZATION      CHOLECYSTECTOMY      COLONOSCOPY      DENTAL SURGERY      at age 23 yr    HERNIA REPAIR  2003    right inguinal    TONSILLECTOMY  6    TONSILLECTOMY AND ADENOIDECTOMY      TOOTH EXTRACTION  unsure       Social History     Socioeconomic History    Marital status:      Spouse name: Marj Argueta Number of children: 10    Years of education: 15    Highest education level: Associate degree: occupational, technical, or vocational program   Occupational History    Not on file   Tobacco Use    Smoking status: Former Smoker     Packs/day: 2.00     Years: 10.00     Pack years: 20.00     Types: Cigarettes     Quit date: 1961     Years since quittin.7    Smokeless tobacco: Never Used   Substance and Sexual Activity    Alcohol use: No    Drug use: No    Sexual activity: Yes     Partners: Female   Other Topics Concern    Not on file   Social History Narrative    Not on file     Social Determinants of Health     Financial Resource Strain: Low Risk     Difficulty of Paying Living Expenses: Not hard at all   Food Insecurity: No Food Insecurity    Worried About [employer] in the Last Year: Never true    [facility] in the Last Year: Never true   Transportation Needs:     Lack of Transportation (Medical): Not on file    Lack of Transportation (Non-Medical):  Not on file   Physical Activity: Unknown    Days of Exercise per Week: 0 days    Minutes of Exercise per Session: Not on file   Stress:     Feeling of Stress : Not on file   Social Connections:     Frequency of Communication with Friends and Family: Not on file    Frequency of Social Gatherings with Friends and Family: Not on file    Attends Latter day Services: Not on file    Active Member of Clubs or Organizations: Not on file    Attends Club or Organization Meetings: Not on file    Marital Status: Not on file   Intimate Partner Violence:     Fear of Current or Ex-Partner: Not on file    Emotionally Abused: Not on file    Physically Abused: Not on file    Sexually Abused: Not on file   Housing Stability:     Unable to Pay for Housing in the Last Year: Not on file    Number of Places Lived in the Last Year: Not on file    Unstable Housing in the Last Year: Not on file        Family History   Problem Relation Age of Onset    Heart Disease Father     Hearing Loss Father     High Blood Pressure Father     Arthritis Father     Arthritis Mother     Cancer Mother     High Blood Pressure Mother     Vision Loss Mother     High Blood Pressure Brother     Other Brother         bilateral knee repacement    Stroke Brother        ADVANCE DIRECTIVE: N, <no information>    Vitals:    05/26/22 1409   BP: 124/76   Site: Left Upper Arm   Position: Sitting   Cuff Size: Large Adult   Pulse: 72   Resp: 16   Weight: 282 lb 14.4 oz (128.3 kg)   Height: 5' 10\" (1.778 m)     Estimated body mass index is 40.59 kg/m² as calculated from the following:    Height as of this encounter: 5' 10\" (1.778 m). Weight as of this encounter: 282 lb 14.4 oz (128.3 kg). Physical Exam  Vitals and nursing note reviewed. Constitutional:       General: He is not in acute distress. Appearance: Normal appearance. He is well-developed. HENT:      Head: Normocephalic and atraumatic. Right Ear: Tympanic membrane normal.      Left Ear: Tympanic membrane normal.   Eyes:      Conjunctiva/sclera: Conjunctivae normal.   Cardiovascular:      Rate and Rhythm: Normal rate and regular rhythm. Heart sounds: Normal heart sounds. No murmur heard. Pulmonary:      Effort: Pulmonary effort is normal.      Breath sounds: Normal breath sounds. No wheezing, rhonchi or rales. Abdominal:      General: There is no distension. Musculoskeletal:      Cervical back: Neck supple. Skin:     General: Skin is warm and dry. Findings: No rash (on exposed surfaces). Neurological:      General: No focal deficit present. Mental Status: He is alert.    Psychiatric:         Attention and Perception: Attention normal.         Mood and Affect: Mood normal.         Speech: Speech normal.         Behavior: Behavior normal. Behavior is cooperative. Thought Content: Thought content normal.         Judgment: Judgment normal.         No flowsheet data found. Lab Results   Component Value Date    CHOL 221 05/25/2021    CHOL 205 02/19/2020    CHOL 198 03/28/2017    TRIG 185 05/25/2021    TRIG 210 02/19/2020    TRIG 143 03/28/2017    HDL 46 05/25/2021    HDL 40 02/19/2020    HDL 45 03/28/2017    LDLCALC 142 05/25/2021    LDLCALC 123 02/19/2020    LDLCALC 124 03/28/2017    GLUCOSE 125 10/19/2021    LABA1C 5.5 05/25/2021       The ASCVD Risk score (Jada Hu, et al., 2013) failed to calculate for the following reasons: The 2013 ASCVD risk score is only valid for ages 36 to 78    There is no immunization history for the selected administration types on file for this patient. Health Maintenance   Topic Date Due    COVID-19 Vaccine (1) Never done    DTaP/Tdap/Td vaccine (1 - Tdap) Never done    Shingles vaccine (1 of 2) Never done    Pneumococcal 65+ years Vaccine (1 - PCV) Never done    Depression Screen  05/18/2022    Flu vaccine (Season Ended) 09/01/2022    Annual Wellness Visit (AWV)  05/27/2023    Hepatitis A vaccine  Aged Out    Hepatitis B vaccine  Aged Out    Hib vaccine  Aged Out    Meningococcal (ACWY) vaccine  Aged Out       Assessment & Plan   Medicare annual wellness visit, subsequent  Essential hypertension  -     CBC with Auto Differential; Future  Coronary artery disease due to lipid rich plaque  Morbidly obese (HCC)  Wide-complex tachycardia (HCC)  Mixed hyperlipidemia  -     Lipid Panel w/ Reflex Direct LDL; Future  -     Comprehensive Metabolic Panel; Future  -     TSH with Reflex; Future  IFG (impaired fasting glucose)  -     Comprehensive Metabolic Panel;  Future  -     Hemoglobin A1C; Future  Acromegaly ([facility]  Pituitary macroadenoma (HCC)    -  Chronic medical problems stable  -  Labs reviewed, look fine  - Continue current medications  -  Follow up with specialists as scheduled  -  Declines all immunizations    Return for Medicare Annual Wellness Visit in 1 year. --Derrick Santos, DO    Medicare Annual Wellness Visit    Andrew Sagastume is here for Medicare AWV    Assessment & Plan   Medicare annual wellness visit, subsequent  Essential hypertension  -     CBC with Auto Differential; Future  Coronary artery disease due to lipid rich plaque  Morbidly obese (HCC)  Wide-complex tachycardia (HCC)  Mixed hyperlipidemia  -     Lipid Panel w/ Reflex Direct LDL; Future  -     Comprehensive Metabolic Panel; Future  -     TSH with Reflex; Future  IFG (impaired fasting glucose)  -     Comprehensive Metabolic Panel; Future  -     Hemoglobin A1C; Future  Acromegaly ([facility]  Pituitary macroadenoma ([facility] [address]      Recommendations for Preventive Services Due: see orders and patient instructions/AVS.  Recommended screening schedule for the next 5-10 years is provided to the patient in written form: see Patient Instructions/AVS.     Return for Medicare Annual Wellness Visit in 1 year. Subjective       Patient's complete Health Risk Assessment and screening values have been reviewed and are found in Flowsheets. The following problems were reviewed today and where indicated follow up appointments were made and/or referrals ordered.     Positive Risk Factor Screenings with Interventions:              Health Habits/Nutrition:     Physical Activity: Unknown    Days of Exercise per Week: 0 days    Minutes of Exercise per Session: Not on file        Body mass index: (!) 40.59  Have you seen the dentist within the past year?: N/A - wear dentures    Health Habits/Nutrition Interventions:  · Nutritional issues:  educational materials for healthy, well-balanced diet provided             Objective   Vitals:    05/26/22 1409   BP: 124/76   Site: Left Upper Arm   Position: Sitting   Cuff Size: Large Adult   Pulse: 72   Resp: 16   Weight: 282 lb 14.4 oz (128.3 kg)   Height: 5' 10\" (1.778 m)      Body mass index is 40.59 kg/m². Allergies   Allergen Reactions    Dilaudid [Hydromorphone Hcl] Shortness Of Breath and Swelling    Hydrocodone-Acetaminophen Other (See Comments)     Visual halucinations. Prior to Visit Medications    Medication Sig Taking? Authorizing Provider   lisinopril (PRINIVIL;ZESTRIL) 20 MG tablet Take 1 tablet by mouth 2 times daily Yes Brian Half, APRN - CNP   metoprolol tartrate (LOPRESSOR) 50 MG tablet Take 1 tablet by mouth 2 times daily Yes Brian Half, APRN - CNP   ascorbic acid (VITAMIN C) 500 MG tablet Take 1 tablet by mouth daily Yes Stanford Del Rio DO   Cholecalciferol (VITAMIN D-3 PO) Take by mouth Yes Historical Provider, MD   ZINC PO Take by mouth Yes Historical Provider, MD   Misc Natural Products (GLUCOSAMINE CHONDROITIN MSM PO) Take by mouth Yes Historical Provider, MD   docusate sodium (COLACE) 100 MG capsule Take 100 mg by mouth every evening  Yes Historical Provider, MD   multivitamin (THERAGRAN) per tablet Take 1 tablet by mouth daily  Yes Historical Provider, MD   Lysine 500 MG CAPS Take 500 mg by mouth. 2 daily    Yes Historical Provider, MD   Arginine 500 MG CAPS Take 1,000 mg by mouth daily  Yes Historical Provider, MD   [place] 250 MG CAPS Take 250 mg by mouth Once or twice daily Yes Historical Provider, MD   B Complex Vitamins (VITAMIN B COMPLEX PO) Take  by mouth.  Twice weekly  Yes Historical Provider, MD   albuterol sulfate  (90 Base) MCG/ACT inhaler Inhale 2 puffs into the lungs every 6 hours as needed for Wheezing or Shortness of Breath  Patient not taking: Reported on 5/26/2022  DO [place] (Including outside providers/suppliers regularly involved in providing care):   Patient Care Team:  Stefan Horowitz DO as PCP - General (Family Medicine)  Stefan Horowitz DO [facility]CP [facility]     Reviewed and updated this visit:  Tobacco  Allergies  Meds  Problems  Med Hx  Surg Hx  Soc Hx  Fam Hx

## 2022-06-02 LAB
ALBUMIN SERPL-MCNC: 4.5 G/DL
ALP BLD-CCNC: 68 U/L
ALT SERPL-CCNC: 20 U/L
ANION GAP SERPL CALCULATED.3IONS-SCNC: 2.3 MMOL/L
AST SERPL-CCNC: 25 U/L
BILIRUB SERPL-MCNC: 0.6 MG/DL (ref 0.1–1.4)
BUN BLDV-MCNC: 16 MG/DL
CALCIUM SERPL-MCNC: 9.6 MG/DL
CHLORIDE BLD-SCNC: 101 MMOL/L
CO2: 27 MMOL/L
CREAT SERPL-MCNC: 0.94 MG/DL
GFR CALCULATED: 80
GLUCOSE BLD-MCNC: 95 MG/DL
POTASSIUM SERPL-SCNC: 4.5 MMOL/L
SODIUM BLD-SCNC: 142 MMOL/L
TOTAL PROTEIN: 6.5

## 2022-06-06 DIAGNOSIS — E78.2 MIXED HYPERLIPIDEMIA: ICD-10-CM

## 2022-06-06 DIAGNOSIS — R79.89 ELEVATED TSH: Primary | ICD-10-CM

## 2022-09-06 LAB — TSH SERPL DL<=0.05 MIU/L-ACNC: 6.78 UIU/ML

## 2023-02-06 ENCOUNTER — TELEPHONE (OUTPATIENT)
Dept: FAMILY MEDICINE CLINIC | Age: 83
End: 2023-02-06

## 2023-02-06 DIAGNOSIS — I10 ESSENTIAL HYPERTENSION: ICD-10-CM

## 2023-02-06 RX ORDER — LISINOPRIL 20 MG/1
20 TABLET ORAL 2 TIMES DAILY
Qty: 180 TABLET | Refills: 3 | Status: SHIPPED | OUTPATIENT
Start: 2023-02-06

## 2023-02-06 RX ORDER — METOPROLOL TARTRATE 50 MG/1
50 TABLET, FILM COATED ORAL 2 TIMES DAILY
Qty: 180 TABLET | Refills: 3 | Status: SHIPPED | OUTPATIENT
Start: 2023-02-06

## 2023-05-30 ENCOUNTER — OFFICE VISIT (OUTPATIENT)
Dept: FAMILY MEDICINE CLINIC | Age: 83
End: 2023-05-30
Payer: MEDICARE

## 2023-05-30 VITALS
HEART RATE: 56 BPM | RESPIRATION RATE: 20 BRPM | SYSTOLIC BLOOD PRESSURE: 136 MMHG | DIASTOLIC BLOOD PRESSURE: 84 MMHG | WEIGHT: 281.8 LBS | HEIGHT: 70 IN | BODY MASS INDEX: 40.34 KG/M2

## 2023-05-30 DIAGNOSIS — I25.10 CORONARY ARTERY DISEASE DUE TO LIPID RICH PLAQUE: ICD-10-CM

## 2023-05-30 DIAGNOSIS — E66.01 OBESITY, CLASS III, BMI 40-49.9 (MORBID OBESITY) (HCC): ICD-10-CM

## 2023-05-30 DIAGNOSIS — Z00.00 MEDICARE ANNUAL WELLNESS VISIT, SUBSEQUENT: Primary | ICD-10-CM

## 2023-05-30 DIAGNOSIS — I10 ESSENTIAL HYPERTENSION: ICD-10-CM

## 2023-05-30 DIAGNOSIS — D35.2 PITUITARY MACROADENOMA (HCC): ICD-10-CM

## 2023-05-30 DIAGNOSIS — I25.83 CORONARY ARTERY DISEASE DUE TO LIPID RICH PLAQUE: ICD-10-CM

## 2023-05-30 DIAGNOSIS — E66.01 MORBIDLY OBESE (HCC): ICD-10-CM

## 2023-05-30 DIAGNOSIS — R00.0 WIDE-COMPLEX TACHYCARDIA: ICD-10-CM

## 2023-05-30 DIAGNOSIS — E78.2 MIXED HYPERLIPIDEMIA: ICD-10-CM

## 2023-05-30 DIAGNOSIS — E22.0 ACROMEGALY (HCC): ICD-10-CM

## 2023-05-30 DIAGNOSIS — I47.20 VENTRICULAR TACHYCARDIA (HCC): ICD-10-CM

## 2023-05-30 DIAGNOSIS — R73.01 IFG (IMPAIRED FASTING GLUCOSE): ICD-10-CM

## 2023-05-30 PROCEDURE — G0439 PPPS, SUBSEQ VISIT: HCPCS | Performed by: FAMILY MEDICINE

## 2023-05-30 PROCEDURE — 3079F DIAST BP 80-89 MM HG: CPT | Performed by: FAMILY MEDICINE

## 2023-05-30 PROCEDURE — 3075F SYST BP GE 130 - 139MM HG: CPT | Performed by: FAMILY MEDICINE

## 2023-05-30 PROCEDURE — 1123F ACP DISCUSS/DSCN MKR DOCD: CPT | Performed by: FAMILY MEDICINE

## 2023-05-30 SDOH — ECONOMIC STABILITY: HOUSING INSECURITY
IN THE LAST 12 MONTHS, WAS THERE A TIME WHEN YOU DID NOT HAVE A STEADY PLACE TO SLEEP OR SLEPT IN A SHELTER (INCLUDING NOW)?: NO

## 2023-05-30 SDOH — ECONOMIC STABILITY: FOOD INSECURITY: WITHIN THE PAST 12 MONTHS, THE FOOD YOU BOUGHT JUST DIDN'T LAST AND YOU DIDN'T HAVE MONEY TO GET MORE.: NEVER TRUE

## 2023-05-30 SDOH — ECONOMIC STABILITY: INCOME INSECURITY: HOW HARD IS IT FOR YOU TO PAY FOR THE VERY BASICS LIKE FOOD, HOUSING, MEDICAL CARE, AND HEATING?: NOT HARD AT ALL

## 2023-05-30 SDOH — ECONOMIC STABILITY: FOOD INSECURITY: WITHIN THE PAST 12 MONTHS, YOU WORRIED THAT YOUR FOOD WOULD RUN OUT BEFORE YOU GOT MONEY TO BUY MORE.: NEVER TRUE

## 2023-05-30 ASSESSMENT — PATIENT HEALTH QUESTIONNAIRE - PHQ9
SUM OF ALL RESPONSES TO PHQ QUESTIONS 1-9: 0
2. FEELING DOWN, DEPRESSED OR HOPELESS: 0
1. LITTLE INTEREST OR PLEASURE IN DOING THINGS: 0
SUM OF ALL RESPONSES TO PHQ9 QUESTIONS 1 & 2: 0

## 2023-05-30 ASSESSMENT — ENCOUNTER SYMPTOMS
RESPIRATORY NEGATIVE: 1
GASTROINTESTINAL NEGATIVE: 1

## 2023-05-30 ASSESSMENT — LIFESTYLE VARIABLES: HOW OFTEN DO YOU HAVE A DRINK CONTAINING ALCOHOL: NEVER

## 2023-05-30 NOTE — PROGRESS NOTES
2023    Luis Robertson (:  1940) is a 79 y.o. male, here for a preventive medicine evaluation. Chief Complaint   Patient presents with    Medicare [place] [address] Maintenance     Needs a lipid panel     AWV. Doing well, no major concerns. BP Readings from Last 3 Encounters:   23 136/84   22 124/76   10/19/21 137/73     Wt Readings from Last 3 Encounters:   23 281 lb 12.8 oz (127.8 kg)   22 282 lb 14.4 oz (128.3 kg)   10/18/21 284 lb (128.8 kg)         Patient Active Problem List   Diagnosis    Tobacco dependence in remission    Morbidly obese ([facility]    Hyperlipemia, 2005    CAD (coronary artery, 2009    Pituitary macroadenoma, 2009    History of adenomatous polyp of colon,     Acromegaly ([facility] [address] [facility] due to pituitary adenoma. Medication monitoring encounter    S/P laparoscopic cholecystectomy    ED (erectile dysfunction)    Pituitary mass ([facility] [facility]    Ventricular tachycardia (HCC)    near syncope  dizziness    Generalized headache new today  vertex    Frequent PVCs    Ataxic gait    Accelerated hypertension    Testicular pain, left    Episodic lightheadedness, not positional    Cervical muscle pain    Pneumonia due to COVID-19 virus       Review of Systems   Constitutional: Negative. HENT: Negative. Respiratory: Negative. Cardiovascular: Negative. Gastrointestinal: Negative. Musculoskeletal: Negative. All other systems reviewed and are negative. Prior to Visit Medications    Medication Sig Taking?  Authorizing Provider   metoprolol tartrate (LOPRESSOR) 50 MG tablet Take 1 tablet by mouth 2 times daily Yes Maricel Montilla DO   lisinopril (PRINIVIL;ZESTRIL) 20 MG tablet Take 1 tablet by mouth 2 times daily Yes Maricel Montilla DO   albuterol sulfate  (90 Base) MCG/ACT inhaler Inhale 2 puffs into the lungs every 6 hours as needed for Wheezing or Shortness of Breath Yes Olesya Avalos,    ascorbic acid (VITAMIN C) 500 MG

## 2023-05-30 NOTE — PATIENT INSTRUCTIONS
ANTICOAGULATION  MANAGEMENT    Assessment     Today's INR result of 2.2 is Therapeutic (goal INR of 2.0-3.0)        Warfarin taken as previously instructed    No new diet changes affecting INR    No new medication/supplements affecting INR    Continues to tolerate warfarin with no reported s/s of bleeding or thromboembolism     Previous INR was Therapeutic    Plan:     Left a detailed message for Jeimy regarding INR result and instructed:     Warfarin Dosing Instructions:  Continue current warfarin dose 5 mg daily on sun/thu; and 10 mg daily rest of week  (o % change)    Instructed patient to follow up no later than: two weeks      Instructed to call the [facility] for any changes, questions or concerns. (#[phone])   ?   Karen Rivera RN    Subjective/Objective:      Jeimy Lynch, a 37 y.o. female is on warfarin.     Jeimy reports:     Home warfarin dose: as updated on anticoagulation calendar per template     Missed doses: No     Medication changes:  No     S/S of bleeding or thromboembolism:  No     New Injury or illness:  No     Changes in diet or alcohol consumption:  No     Upcoming surgery, procedure or cardioversion:  No    Anticoagulation Episode Summary     Current INR goal:   2.0-3.0   TTR:   59.4 % (1 y)   Next INR check:   3/18/2020   INR from last check:   2.20 (3/4/2020)   Weekly max warfarin dose:      Target end date:      INR check location:      Preferred lab:      Send INR reminders to:   [facility]    Indications    Antiphospholipid antibody syndrome (H) [D68.61]  Factor V Leiden Mutation [D68.59]           Comments:            Anticoagulation Care Providers     Provider Role Specialty Phone number    Ayush Juan DO  Internal Medicine [phone]         [email or web address] and enter [id number] to learn more about \"A Healthy Heart: Care Instructions. \"  Current as of: September 7, 2022               Content Version: 13.6  © 9826-7797 Healthwise, Incorporated. Care instructions adapted under license by [facility] If you have questions about a medical condition or this instruction, always ask your healthcare professional. [address] any warranty or liability for your use of this information. Personalized Preventive Plan for Nell Humphreys - 5/30/2023  Medicare offers a range of preventive health benefits. Some of the tests and screenings are paid in full while other may be subject to a deductible, co-insurance, and/or copay. Some of these benefits include a comprehensive review of your medical history including lifestyle, illnesses that may run in your family, and various assessments and screenings as appropriate. After reviewing your medical record and screening and assessments performed today your provider may have ordered immunizations, labs, imaging, and/or referrals for you. A list of these orders (if applicable) as well as your Preventive Care list are included within your After Visit Summary for your review. Other Preventive Recommendations:    A preventive eye exam performed by an eye specialist is recommended every 1-2 years to screen for glaucoma; cataracts, macular degeneration, and other eye disorders. A preventive dental visit is recommended every 6 months. Try to get at least 150 minutes of exercise per week or 10,000 steps per day on a pedometer . Order or download the FREE \"Exercise & Physical Activity: Your Everyday Guide\" from [employer]. Call 4-933.273.1117 or search [employer] online. You need 9690-5009 mg of calcium and 8806-2476 IU of vitamin D per day.  It is possible to meet your calcium requirement with diet alone, but a vitamin D supplement is

## 2023-06-05 LAB
AVERAGE GLUCOSE: NORMAL
CHOLESTEROL, TOTAL: 190 MG/DL
CHOLESTEROL/HDL RATIO: NORMAL
HBA1C MFR BLD: 5.6 %
HDLC SERPL-MCNC: 46 MG/DL (ref 35–70)
LDL CHOLESTEROL CALCULATED: 118 MG/DL (ref 0–160)
NONHDLC SERPL-MCNC: NORMAL MG/DL
TRIGL SERPL-MCNC: 147 MG/DL
TSH SERPL DL<=0.05 MIU/L-ACNC: 4.22 UIU/ML
VLDLC SERPL CALC-MCNC: 26 MG/DL

## 2024-02-07 DIAGNOSIS — I10 ESSENTIAL HYPERTENSION: ICD-10-CM

## 2024-02-07 RX ORDER — METOPROLOL TARTRATE 50 MG/1
50 TABLET, FILM COATED ORAL 2 TIMES DAILY
Qty: 180 TABLET | Refills: 3 | Status: SHIPPED | OUTPATIENT
Start: 2024-02-07 | End: 2024-02-08 | Stop reason: SDUPTHER

## 2024-02-07 RX ORDER — LISINOPRIL 20 MG/1
20 TABLET ORAL 2 TIMES DAILY
Qty: 180 TABLET | Refills: 3 | Status: SHIPPED | OUTPATIENT
Start: 2024-02-07

## 2024-02-07 NOTE — TELEPHONE ENCOUNTER
Wife Angélica on HIPAA called office requesting refills of the Lisinopril and Metoprolol to [facility] for 90d supplies. Refill if appropriate.    Pt is completely out of the Metoprolol and is needs a 1wk supply local to [address]. If no call back she will check with the pharmacy after 1pm. Refill if appropriate.

## 2024-02-08 RX ORDER — METOPROLOL TARTRATE 50 MG/1
50 TABLET, FILM COATED ORAL 2 TIMES DAILY
Qty: 14 TABLET | Refills: 0 | Status: SHIPPED | OUTPATIENT
Start: 2024-02-08

## 2024-02-08 NOTE — TELEPHONE ENCOUNTER
Wife Angélica called office back and says the short term rx was never sent to [address]. I apologized. They will check with the pharmacy around noon. Please advise.

## 2024-05-13 ENCOUNTER — TELEPHONE (OUTPATIENT)
Dept: FAMILY MEDICINE CLINIC | Age: 84
End: 2024-05-13

## 2024-05-13 DIAGNOSIS — I25.83 CORONARY ARTERY DISEASE DUE TO LIPID RICH PLAQUE: ICD-10-CM

## 2024-05-13 DIAGNOSIS — I10 ESSENTIAL HYPERTENSION: Primary | ICD-10-CM

## 2024-05-13 DIAGNOSIS — R73.01 IFG (IMPAIRED FASTING GLUCOSE): ICD-10-CM

## 2024-05-13 DIAGNOSIS — I25.10 CORONARY ARTERY DISEASE DUE TO LIPID RICH PLAQUE: ICD-10-CM

## 2024-05-13 DIAGNOSIS — E78.2 MIXED HYPERLIPIDEMIA: ICD-10-CM

## 2024-05-13 NOTE — TELEPHONE ENCOUNTER
Lab orders faxed to [email or web address] . Attempted to notify wife Angélica that orders have been sent and 12hr fasting is required, left vm.

## 2024-05-13 NOTE — TELEPHONE ENCOUNTER
The patients wife called in stating that the patient has an appt on 6/3/24 and they are asking if he is due for any labs.  The patient uses [facility].  Please advise.        Please call Angélica back after orders are placed.

## 2024-05-30 LAB
ALBUMIN SERPL-MCNC: 4.2 G/DL
ALP BLD-CCNC: 63 U/L
ALT SERPL-CCNC: 30 U/L
ANION GAP SERPL CALCULATED.3IONS-SCNC: 2.1 MMOL/L
AST SERPL-CCNC: 31 U/L
BASOPHILS ABSOLUTE: 0 /ΜL
BASOPHILS RELATIVE PERCENT: 1 %
BILIRUB SERPL-MCNC: 0.6 MG/DL (ref 0.1–1.4)
BUN BLDV-MCNC: 15 MG/DL
CALCIUM SERPL-MCNC: 9.4 MG/DL
CHLORIDE BLD-SCNC: 100 MMOL/L
CHOLESTEROL, TOTAL: 199 MG/DL
CHOLESTEROL/HDL RATIO: NORMAL
CO2: 28 MMOL/L
CREAT SERPL-MCNC: 1 MG/DL
EGFR: 74
EOSINOPHILS ABSOLUTE: 0.1 /ΜL
EOSINOPHILS RELATIVE PERCENT: 2 %
ESTIMATED AVERAGE GLUCOSE: NORMAL
GLUCOSE BLD-MCNC: 89 MG/DL
HBA1C MFR BLD: 5.5 %
HCT VFR BLD CALC: 40.1 % (ref 41–53)
HDLC SERPL-MCNC: 47 MG/DL (ref 35–70)
HEMOGLOBIN: 13.5 G/DL (ref 13.5–17.5)
LDL CHOLESTEROL CALCULATED: 130 MG/DL (ref 0–160)
LYMPHOCYTES ABSOLUTE: 1.5 /ΜL
LYMPHOCYTES RELATIVE PERCENT: 30 %
MCH RBC QN AUTO: 32.5 PG
MCHC RBC AUTO-ENTMCNC: 33.7 G/DL
MCV RBC AUTO: 96 FL
MONOCYTES ABSOLUTE: 0.4 /ΜL
MONOCYTES RELATIVE PERCENT: 9 %
NEUTROPHILS ABSOLUTE: 2.9 /ΜL
NEUTROPHILS RELATIVE PERCENT: 58 %
NONHDLC SERPL-MCNC: NORMAL MG/DL
PLATELET # BLD: 180 K/ΜL
PMV BLD AUTO: ABNORMAL FL
POTASSIUM SERPL-SCNC: 4.3 MMOL/L
RBC # BLD: 4.16 10^6/ΜL
SODIUM BLD-SCNC: 141 MMOL/L
TOTAL PROTEIN: 6.2
TRIGL SERPL-MCNC: 121 MG/DL
TSH SERPL DL<=0.05 MIU/L-ACNC: 3.73 UIU/ML
VLDLC SERPL CALC-MCNC: 22 MG/DL
WBC # BLD: 5 10^3/ML

## 2024-06-03 ENCOUNTER — OFFICE VISIT (OUTPATIENT)
Dept: FAMILY MEDICINE CLINIC | Age: 84
End: 2024-06-03
Payer: MEDICARE

## 2024-06-03 VITALS
BODY MASS INDEX: 40.07 KG/M2 | HEIGHT: 70 IN | RESPIRATION RATE: 16 BRPM | DIASTOLIC BLOOD PRESSURE: 74 MMHG | HEART RATE: 64 BPM | SYSTOLIC BLOOD PRESSURE: 126 MMHG | WEIGHT: 279.9 LBS

## 2024-06-03 DIAGNOSIS — E66.01 OBESITY, CLASS III, BMI 40-49.9 (MORBID OBESITY) (HCC): ICD-10-CM

## 2024-06-03 DIAGNOSIS — Z00.00 MEDICARE ANNUAL WELLNESS VISIT, SUBSEQUENT: Primary | ICD-10-CM

## 2024-06-03 DIAGNOSIS — R73.01 IFG (IMPAIRED FASTING GLUCOSE): ICD-10-CM

## 2024-06-03 DIAGNOSIS — E78.2 MIXED HYPERLIPIDEMIA: ICD-10-CM

## 2024-06-03 DIAGNOSIS — D35.2 PITUITARY MACROADENOMA (HCC): ICD-10-CM

## 2024-06-03 DIAGNOSIS — I10 ESSENTIAL HYPERTENSION: ICD-10-CM

## 2024-06-03 DIAGNOSIS — E66.01 MORBIDLY OBESE (HCC): ICD-10-CM

## 2024-06-03 DIAGNOSIS — I25.83 CORONARY ARTERY DISEASE DUE TO LIPID RICH PLAQUE: ICD-10-CM

## 2024-06-03 DIAGNOSIS — I25.10 CORONARY ARTERY DISEASE DUE TO LIPID RICH PLAQUE: ICD-10-CM

## 2024-06-03 DIAGNOSIS — E22.0 ACROMEGALY (HCC): ICD-10-CM

## 2024-06-03 PROCEDURE — G0439 PPPS, SUBSEQ VISIT: HCPCS | Performed by: FAMILY MEDICINE

## 2024-06-03 PROCEDURE — 3074F SYST BP LT 130 MM HG: CPT | Performed by: FAMILY MEDICINE

## 2024-06-03 PROCEDURE — 1123F ACP DISCUSS/DSCN MKR DOCD: CPT | Performed by: FAMILY MEDICINE

## 2024-06-03 PROCEDURE — 3078F DIAST BP <80 MM HG: CPT | Performed by: FAMILY MEDICINE

## 2024-06-03 PROCEDURE — 99214 OFFICE O/P EST MOD 30 MIN: CPT | Performed by: FAMILY MEDICINE

## 2024-06-03 SDOH — ECONOMIC STABILITY: FOOD INSECURITY: WITHIN THE PAST 12 MONTHS, YOU WORRIED THAT YOUR FOOD WOULD RUN OUT BEFORE YOU GOT MONEY TO BUY MORE.: NEVER TRUE

## 2024-06-03 SDOH — ECONOMIC STABILITY: FOOD INSECURITY: WITHIN THE PAST 12 MONTHS, THE FOOD YOU BOUGHT JUST DIDN'T LAST AND YOU DIDN'T HAVE MONEY TO GET MORE.: NEVER TRUE

## 2024-06-03 SDOH — ECONOMIC STABILITY: INCOME INSECURITY: HOW HARD IS IT FOR YOU TO PAY FOR THE VERY BASICS LIKE FOOD, HOUSING, MEDICAL CARE, AND HEATING?: NOT HARD AT ALL

## 2024-06-03 ASSESSMENT — PATIENT HEALTH QUESTIONNAIRE - PHQ9
2. FEELING DOWN, DEPRESSED OR HOPELESS: NOT AT ALL
SUM OF ALL RESPONSES TO PHQ QUESTIONS 1-9: 0
SUM OF ALL RESPONSES TO PHQ9 QUESTIONS 1 & 2: 0
1. LITTLE INTEREST OR PLEASURE IN DOING THINGS: NOT AT ALL
SUM OF ALL RESPONSES TO PHQ QUESTIONS 1-9: 0

## 2024-06-03 ASSESSMENT — ENCOUNTER SYMPTOMS
GASTROINTESTINAL NEGATIVE: 1
RESPIRATORY NEGATIVE: 1

## 2024-06-03 ASSESSMENT — LIFESTYLE VARIABLES
HOW MANY STANDARD DRINKS CONTAINING ALCOHOL DO YOU HAVE ON A TYPICAL DAY: PATIENT DOES NOT DRINK
HOW OFTEN DO YOU HAVE A DRINK CONTAINING ALCOHOL: NEVER

## 2024-06-03 NOTE — PATIENT INSTRUCTIONS
You may receive a survey about your visit with us today. The feedback from our patients helps us identify what is working well and where the service to all patients can be enhanced. Thank you!        Learning About Being Active as an Older Adult  Why is being active important as you get older?     Being active is one of the best things you can do for your health. And it's never too late to start. Being active--or getting active, if you aren't already--has definite benefits. It can:  Give you more energy,  Keep your mind sharp.  Improve balance to reduce your risk of falls.  Help you manage chronic illness with fewer medicines.  No matter how old you are, how fit you are, or what health problems you have, there is a form of activity that will work for you. And the more physical activity you can do, the better your overall health will be.  What kinds of activity can help you stay healthy?  Being more active will make your daily activities easier. Physical activity includes planned exercise and things you do in daily life. There are four types of activity:  Aerobic.  Doing aerobic activity makes your heart and lungs strong.  Includes walking, dancing, and gardening.  Aim for at least 2½ hours spread throughout the week.  It improves your energy and can help you sleep better.  Muscle-strengthening.  This type of activity can help maintain muscle and strengthen bones.  Includes climbing stairs, using resistance bands, and lifting or carrying heavy loads.  Aim for at least twice a week.  It can help protect the knees and other joints.  Stretching.  Stretching gives you better range of motion in joints and muscles.  Includes upper arm stretches, calf stretches, and gentle yoga.  Aim for at least twice a week, preferably after your muscles are warmed up from other activities.  It can help you function better in daily life.  Balancing.  This helps you stay coordinated and have good posture.  Includes heel-to-toe walking, don

## 2024-06-03 NOTE — PROGRESS NOTES
N, <no information>    Vitals:    06/03/24 1318   BP: 126/74   Site: Left Upper Arm   Position: Sitting   Cuff Size: Large Adult   Pulse: 64   Resp: 16   Weight: 127 kg (279 lb 14.4 oz)   Height: 1.778 m (5' 10\")     Estimated body mass index is 40.16 kg/m² as calculated from the following:    Height as of this encounter: 1.778 m (5' 10\").    Weight as of this encounter: 127 kg (279 lb 14.4 oz).       Objective   Physical Exam  Vitals and nursing note reviewed.   Constitutional:       General: He is not in acute distress.     Appearance: Normal appearance. He is well-developed.   HENT:      Head: Normocephalic and atraumatic.      Right Ear: Tympanic membrane normal.      Left Ear: Tympanic membrane normal.   Eyes:      Conjunctiva/sclera: Conjunctivae normal.   Cardiovascular:      Rate and Rhythm: Normal rate and regular rhythm.      Heart sounds: Normal heart sounds. No murmur heard.  Pulmonary:      Effort: Pulmonary effort is normal.      Breath sounds: Normal breath sounds. No wheezing, rhonchi or rales.   Abdominal:      General: There is no distension.   Musculoskeletal:      Cervical back: Neck supple.   Skin:     General: Skin is warm and dry.      Findings: No rash (on exposed surfaces).   Neurological:      General: No focal deficit present.      Mental Status: He is alert.   Psychiatric:         Attention and Perception: Attention normal.         Mood and Affect: Mood normal.         Speech: Speech normal.         Behavior: Behavior normal. Behavior is cooperative.         Thought Content: Thought content normal.         Judgment: Judgment normal.             Latest Ref Rng & Units 6/5/2023    12:00 AM 9/6/2022    12:00 AM 6/2/2022    12:00 AM   [facility]   A1C % 5.6         A1C POC % 5.6         GLU random mg/dL   95       CHOL mg/dL 190         TRIG mg/dL 147         HDL 35 - 70 mg/dL 46         LDL CALC 0 - 160 mg/dL 118         NA mmol/L   142       K mmol/L   4.5       BUN mg/dL   16

## 2025-01-24 DIAGNOSIS — I10 ESSENTIAL HYPERTENSION: ICD-10-CM

## 2025-01-27 RX ORDER — METOPROLOL TARTRATE 50 MG
50 TABLET ORAL 2 TIMES DAILY
Qty: 180 TABLET | Refills: 3 | Status: ON HOLD | OUTPATIENT
Start: 2025-01-27

## 2025-01-27 RX ORDER — LISINOPRIL 20 MG/1
20 TABLET ORAL 2 TIMES DAILY
Qty: 180 TABLET | Refills: 3 | Status: ON HOLD | OUTPATIENT
Start: 2025-01-27

## 2025-01-28 ENCOUNTER — APPOINTMENT (OUTPATIENT)
Dept: GENERAL RADIOLOGY | Age: 85
DRG: 853 | End: 2025-01-28
Payer: MEDICARE

## 2025-01-28 ENCOUNTER — HOSPITAL ENCOUNTER (INPATIENT)
Age: 85
DRG: 853 | End: 2025-01-28
Attending: EMERGENCY MEDICINE | Admitting: ORTHOPAEDIC SURGERY
Payer: MEDICARE

## 2025-01-28 ENCOUNTER — APPOINTMENT (OUTPATIENT)
Dept: CT IMAGING | Age: 85
DRG: 853 | End: 2025-01-28
Payer: MEDICARE

## 2025-01-28 DIAGNOSIS — S72.002A CLOSED FRACTURE OF NECK OF LEFT FEMUR, INITIAL ENCOUNTER (HCC): ICD-10-CM

## 2025-01-28 DIAGNOSIS — M25.552 LEFT HIP PAIN: ICD-10-CM

## 2025-01-28 DIAGNOSIS — J96.02 ACUTE RESPIRATORY FAILURE WITH HYPERCAPNIA: ICD-10-CM
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
## 2025-01-28 DIAGNOSIS — I48.0 PAROXYSMAL ATRIAL FIBRILLATION (HCC): ICD-10-CM

## 2025-01-28 DIAGNOSIS — R26.2 INABILITY TO AMBULATE DUE TO HIP: ICD-10-CM

## 2025-01-28 DIAGNOSIS — R01.1 HEART MURMUR: ICD-10-CM

## 2025-01-28 LAB
ALBUMIN SERPL BCG-MCNC: 3.9 G/DL (ref 3.5–5.1)
ALP SERPL-CCNC: 66 U/L (ref 38–126)
ALT SERPL W/O P-5'-P-CCNC: 17 U/L (ref 11–66)
ANION GAP SERPL CALC-SCNC: 11 MEQ/L (ref 8–16)
AST SERPL-CCNC: 22 U/L (ref 5–40)
BASOPHILS ABSOLUTE: 0 THOU/MM3 (ref 0–0.1)
BASOPHILS NFR BLD AUTO: 0.3 %
BILIRUB CONJ SERPL-MCNC: 0.2 MG/DL (ref 0.1–13.8)
BILIRUB SERPL-MCNC: 0.4 MG/DL (ref 0.3–1.2)
BUN SERPL-MCNC: 17 MG/DL (ref 7–22)
CALCIUM SERPL-MCNC: 9.5 MG/DL (ref 8.5–10.5)
CHLORIDE SERPL-SCNC: 103 MEQ/L (ref 98–111)
CO2 SERPL-SCNC: 25 MEQ/L (ref 23–33)
CREAT SERPL-MCNC: 0.9 MG/DL (ref 0.4–1.2)
DEPRECATED RDW RBC AUTO: 43.2 FL (ref 35–45)
EKG Q-T INTERVAL: 392 MS
EKG QRS DURATION: 130 MS
EKG QTC CALCULATION (BAZETT): 437 MS
EKG R AXIS: -4 DEGREES
EKG T AXIS: 88 DEGREES
EKG VENTRICULAR RATE: 75 BPM
EOSINOPHIL NFR BLD AUTO: 1.2 %
EOSINOPHILS ABSOLUTE: 0.1 THOU/MM3 (ref 0–0.4)
ERYTHROCYTE [DISTWIDTH] IN BLOOD BY AUTOMATED COUNT: 12.7 % (ref 11.5–14.5)
GFR SERPL CREATININE-BSD FRML MDRD: 83 ML/MIN/1.73M2
GLUCOSE SERPL-MCNC: 106 MG/DL (ref 70–108)
HCT VFR BLD AUTO: 39.5 % (ref 42–52)
HGB BLD-MCNC: 13.7 GM/DL (ref 14–18)
IMM GRANULOCYTES # BLD AUTO: 0.04 THOU/MM3 (ref 0–0.07)
IMM GRANULOCYTES NFR BLD AUTO: 0.6 %
LIPASE SERPL-CCNC: 36.5 U/L (ref 5.6–51.3)
LYMPHOCYTES ABSOLUTE: 1.1 THOU/MM3 (ref 1–4.8)
LYMPHOCYTES NFR BLD AUTO: 16.7 %
MAGNESIUM SERPL-MCNC: 2 MG/DL (ref 1.6–2.4)
MCH RBC QN AUTO: 32.7 PG (ref 26–33)
MCHC RBC AUTO-ENTMCNC: 34.7 GM/DL (ref 32.2–35.5)
MCV RBC AUTO: 94.3 FL (ref 80–94)
MONOCYTES ABSOLUTE: 0.5 THOU/MM3 (ref 0.4–1.3)
MONOCYTES NFR BLD AUTO: 7.1 %
NEUTROPHILS ABSOLUTE: 5 THOU/MM3 (ref 1.8–7.7)
NEUTROPHILS NFR BLD AUTO: 74.1 %
NRBC BLD AUTO-RTO: 0 /100 WBC
NT-PROBNP SERPL IA-MCNC: 467.4 PG/ML (ref 0–449)
OSMOLALITY SERPL CALC.SUM OF ELEC: 279.5 MOSMOL/KG (ref 275–300)
PLATELET # BLD AUTO: 163 THOU/MM3 (ref 130–400)
PMV BLD AUTO: 9.9 FL (ref 9.4–12.4)
POTASSIUM SERPL-SCNC: 4.1 MEQ/L (ref 3.5–5.2)
PROT SERPL-MCNC: 6.4 G/DL (ref 6.1–8)
RBC # BLD AUTO: 4.19 MILL/MM3 (ref 4.7–6.1)
SODIUM SERPL-SCNC: 139 MEQ/L (ref 135–145)
TROPONIN, HIGH SENSITIVITY: 22 NG/L (ref 0–12)
TSH SERPL DL<=0.005 MIU/L-ACNC: 9.7 UIU/ML (ref 0.4–4.2)
WBC # BLD AUTO: 6.8 THOU/MM3 (ref 4.8–10.8)

## 2025-01-28 PROCEDURE — 84443 ASSAY THYROID STIM HORMONE: CPT

## 2025-01-28 PROCEDURE — 83735 ASSAY OF MAGNESIUM: CPT

## 2025-01-28 PROCEDURE — 99285 EMERGENCY DEPT VISIT HI MDM: CPT

## 2025-01-28 PROCEDURE — 36415 COLL VENOUS BLD VENIPUNCTURE: CPT

## 2025-01-28 PROCEDURE — 83880 ASSAY OF NATRIURETIC PEPTIDE: CPT

## 2025-01-28 PROCEDURE — 72170 X-RAY EXAM OF PELVIS: CPT

## 2025-01-28 PROCEDURE — 83690 ASSAY OF LIPASE: CPT

## 2025-01-28 PROCEDURE — 85025 COMPLETE CBC W/AUTO DIFF WBC: CPT

## 2025-01-28 PROCEDURE — 6360000002 HC RX W HCPCS: Performed by: EMERGENCY MEDICINE

## 2025-01-28 PROCEDURE — 72192 CT PELVIS W/O DYE: CPT

## 2025-01-28 PROCEDURE — 73552 X-RAY EXAM OF FEMUR 2/>: CPT

## 2025-01-28 PROCEDURE — 6820000001 HC L2 TRAUMA SURGERY EVALUATION: Performed by: SURGERY

## 2025-01-28 PROCEDURE — 82248 BILIRUBIN DIRECT: CPT

## 2025-01-28 PROCEDURE — 93005 ELECTROCARDIOGRAM TRACING: CPT | Performed by: EMERGENCY MEDICINE

## 2025-01-28 PROCEDURE — 80053 COMPREHEN METABOLIC PANEL: CPT

## 2025-01-28 PROCEDURE — 84484 ASSAY OF TROPONIN QUANT: CPT

## 2025-01-28 RX ORDER — FENTANYL CITRATE 50 UG/ML
100 INJECTION, SOLUTION INTRAMUSCULAR; INTRAVENOUS ONCE
Status: COMPLETED | OUTPATIENT
Start: 2025-01-28 | End: 2025-01-28

## 2025-01-28 RX ORDER — FENTANYL CITRATE 50 UG/ML
50 INJECTION, SOLUTION INTRAMUSCULAR; INTRAVENOUS ONCE
Status: COMPLETED | OUTPATIENT
Start: 2025-01-28 | End: 2025-01-28

## 2025-01-28 RX ORDER — ONDANSETRON 2 MG/ML
4 INJECTION INTRAMUSCULAR; INTRAVENOUS ONCE
Status: COMPLETED | OUTPATIENT
Start: 2025-01-28 | End: 2025-01-28

## 2025-01-28 RX ADMIN — FENTANYL CITRATE 100 MCG: 50 INJECTION, SOLUTION INTRAMUSCULAR; INTRAVENOUS at 23:33

## 2025-01-28 RX ADMIN — ONDANSETRON 4 MG: 2 INJECTION INTRAMUSCULAR; INTRAVENOUS at 22:33

## 2025-01-28 RX ADMIN — FENTANYL CITRATE 50 MCG: 50 INJECTION, SOLUTION INTRAMUSCULAR; INTRAVENOUS at 22:33

## 2025-01-28 ASSESSMENT — ENCOUNTER SYMPTOMS
COUGH: 0
NAUSEA: 0
BACK PAIN: 0
EYE DISCHARGE: 0
ABDOMINAL PAIN: 0
RHINORRHEA: 0
DIARRHEA: 0
SHORTNESS OF BREATH: 0
VOMITING: 0
EYE ITCHING: 0
ABDOMINAL DISTENTION: 0
SORE THROAT: 0
CHEST TIGHTNESS: 0
PHOTOPHOBIA: 0
BLOOD IN STOOL: 0
EYE REDNESS: 0
SINUS PRESSURE: 0
TROUBLE SWALLOWING: 0
VOICE CHANGE: 0
CONSTIPATION: 0
EYE PAIN: 0
CHOKING: 0
WHEEZING: 0

## 2025-01-28 ASSESSMENT — PAIN DESCRIPTION - ORIENTATION: ORIENTATION: LEFT

## 2025-01-28 ASSESSMENT — PAIN SCALES - GENERAL: PAINLEVEL_OUTOF10: 8

## 2025-01-28 ASSESSMENT — PAIN - FUNCTIONAL ASSESSMENT: PAIN_FUNCTIONAL_ASSESSMENT: 0-10

## 2025-01-28 ASSESSMENT — PAIN DESCRIPTION - LOCATION: LOCATION: KNEE

## 2025-01-28 ASSESSMENT — VISUAL ACUITY: OU: 1

## 2025-01-28 ASSESSMENT — PAIN DESCRIPTION - FREQUENCY: FREQUENCY: CONTINUOUS

## 2025-01-28 ASSESSMENT — PAIN DESCRIPTION - PAIN TYPE: TYPE: ACUTE PAIN

## 2025-01-29 ENCOUNTER — APPOINTMENT (OUTPATIENT)
Dept: CT IMAGING | Age: 85
DRG: 853 | End: 2025-01-29
Payer: MEDICARE

## 2025-01-29 PROBLEM — R51.9 GENERALIZED HEADACHES: Status: RESOLVED | Noted: 2017-02-10 | Resolved: 2025-01-29

## 2025-01-29 PROBLEM — R79.89 ELEVATED TROPONIN: Status: ACTIVE | Noted: 2025-01-29

## 2025-01-29 PROBLEM — R74.8 ELEVATION OF CARDIAC ENZYMES: Status: ACTIVE | Noted: 2025-01-29

## 2025-01-29 PROBLEM — M54.2 CERVICAL MUSCLE PAIN: Status: RESOLVED | Noted: 2019-02-07 | Resolved: 2025-01-29

## 2025-01-29 PROBLEM — N50.812 TESTICULAR PAIN, LEFT: Status: RESOLVED | Noted: 2018-06-05 | Resolved: 2025-01-29

## 2025-01-29 PROBLEM — R26.0 ATAXIC GAIT: Status: RESOLVED | Noted: 2017-02-10 | Resolved: 2025-01-29

## 2025-01-29 PROBLEM — T14.90XA TRAUMA: Status: ACTIVE | Noted: 2025-01-29

## 2025-01-29 PROBLEM — R55 SYNCOPE AND COLLAPSE: Status: RESOLVED | Noted: 2017-02-10 | Resolved: 2025-01-29

## 2025-01-29 PROBLEM — I10 ACCELERATED HYPERTENSION: Status: RESOLVED | Noted: 2017-02-10 | Resolved: 2025-01-29

## 2025-01-29 PROBLEM — R42 EPISODIC LIGHTHEADEDNESS: Status: RESOLVED | Noted: 2019-02-07 | Resolved: 2025-01-29

## 2025-01-29 PROBLEM — U07.1 PNEUMONIA DUE TO COVID-19 VIRUS: Status: RESOLVED | Noted: 2021-10-18 | Resolved: 2025-01-29

## 2025-01-29 PROBLEM — J12.82 PNEUMONIA DUE TO COVID-19 VIRUS: Status: RESOLVED | Noted: 2021-10-18 | Resolved: 2025-01-29

## 2025-01-29 LAB
ABO GROUP BLD: NORMAL
ALBUMIN SERPL BCG-MCNC: 3.7 G/DL (ref 3.5–5.1)
ALP SERPL-CCNC: 60 U/L (ref 38–126)
ALT SERPL W/O P-5'-P-CCNC: 17 U/L (ref 11–66)
ANION GAP SERPL CALC-SCNC: 8 MEQ/L (ref 8–16)
ARTERIAL PATENCY WRIST A: POSITIVE
AST SERPL-CCNC: 21 U/L (ref 5–40)
BASE EXCESS BLDA CALC-SCNC: 5.5 MMOL/L (ref -2.5–2.5)
BDY SITE: ABNORMAL
BILIRUB SERPL-MCNC: 1.1 MG/DL (ref 0.3–1.2)
BUN SERPL-MCNC: 17 MG/DL (ref 7–22)
CALCIUM SERPL-MCNC: 9 MG/DL (ref 8.5–10.5)
CHLORIDE SERPL-SCNC: 95 MEQ/L (ref 98–111)
CO2 SERPL-SCNC: 29 MEQ/L (ref 23–33)
COLLECTED BY:: ABNORMAL
CREAT SERPL-MCNC: 0.9 MG/DL (ref 0.4–1.2)
DEVICE: ABNORMAL
EKG ATRIAL RATE: 93 BPM
EKG P-R INTERVAL: 160 MS
EKG Q-T INTERVAL: 300 MS
EKG QRS DURATION: 92 MS
EKG QTC CALCULATION (BAZETT): 466 MS
EKG R AXIS: 50 DEGREES
EKG T AXIS: 60 DEGREES
EKG VENTRICULAR RATE: 145 BPM
ETHANOL SERPL-MCNC: < 0.01 % (ref 0–0.08)
FIO2 ON VENT O2 ANALYZER: 3 %
GFR SERPL CREATININE-BSD FRML MDRD: 83 ML/MIN/1.73M2
GLUCOSE BLD STRIP.AUTO-MCNC: 117 MG/DL (ref 70–108)
GLUCOSE SERPL-MCNC: 125 MG/DL (ref 70–108)
HCO3 BLDA-SCNC: 36 MMOL/L (ref 23–28)
IAT IGG-SP REAG SERPL QL: NORMAL
INR PPP: 1.06 (ref 0.85–1.13)
MAGNESIUM SERPL-MCNC: 1.9 MG/DL (ref 1.6–2.4)
PCO2 BLDA: 83 MMHG (ref 35–45)
PH BLDA: 7.25 [PH] (ref 7.35–7.45)
PHOSPHATE SERPL-MCNC: 4.5 MG/DL (ref 2.4–4.7)
PO2 BLDA: 55 MMHG (ref 71–104)
POTASSIUM SERPL-SCNC: 4.7 MEQ/L (ref 3.5–5.2)
PROT SERPL-MCNC: 6.1 G/DL (ref 6.1–8)
RH BLD: NORMAL
SAO2 % BLDA: 81 %
SODIUM SERPL-SCNC: 132 MEQ/L (ref 135–145)
T4 FREE SERPL-MCNC: 1.09 NG/DL (ref 0.93–1.68)
TROPONIN, HIGH SENSITIVITY: 24 NG/L (ref 0–12)
TROPONIN, HIGH SENSITIVITY: 24 NG/L (ref 0–12)
VENTILATION MODE VENT: ABNORMAL

## 2025-01-29 PROCEDURE — 2700000000 HC OXYGEN THERAPY PER DAY

## 2025-01-29 PROCEDURE — 84439 ASSAY OF FREE THYROXINE: CPT

## 2025-01-29 PROCEDURE — 70496 CT ANGIOGRAPHY HEAD: CPT

## 2025-01-29 PROCEDURE — 93005 ELECTROCARDIOGRAM TRACING: CPT

## 2025-01-29 PROCEDURE — 94660 CPAP INITIATION&MGMT: CPT

## 2025-01-29 PROCEDURE — 82077 ASSAY SPEC XCP UR&BREATH IA: CPT

## 2025-01-29 PROCEDURE — 6360000002 HC RX W HCPCS: Performed by: PHYSICIAN ASSISTANT

## 2025-01-29 PROCEDURE — 70450 CT HEAD/BRAIN W/O DYE: CPT

## 2025-01-29 PROCEDURE — P9016 RBC LEUKOCYTES REDUCED: HCPCS

## 2025-01-29 PROCEDURE — 6360000004 HC RX CONTRAST MEDICATION

## 2025-01-29 PROCEDURE — 36415 COLL VENOUS BLD VENIPUNCTURE: CPT

## 2025-01-29 PROCEDURE — 2500000003 HC RX 250 WO HCPCS: Performed by: PHYSICIAN ASSISTANT

## 2025-01-29 PROCEDURE — 6360000002 HC RX W HCPCS: Performed by: ORTHOPAEDIC SURGERY

## 2025-01-29 PROCEDURE — 71275 CT ANGIOGRAPHY CHEST: CPT

## 2025-01-29 PROCEDURE — 84484 ASSAY OF TROPONIN QUANT: CPT

## 2025-01-29 PROCEDURE — 85610 PROTHROMBIN TIME: CPT

## 2025-01-29 PROCEDURE — 6370000000 HC RX 637 (ALT 250 FOR IP): Performed by: PHYSICIAN ASSISTANT

## 2025-01-29 PROCEDURE — 99222 1ST HOSP IP/OBS MODERATE 55: CPT | Performed by: SURGERY

## 2025-01-29 PROCEDURE — 86901 BLOOD TYPING SEROLOGIC RH(D): CPT

## 2025-01-29 PROCEDURE — 84100 ASSAY OF PHOSPHORUS: CPT

## 2025-01-29 PROCEDURE — 82948 REAGENT STRIP/BLOOD GLUCOSE: CPT

## 2025-01-29 PROCEDURE — 2060000000 HC ICU INTERMEDIATE R&B

## 2025-01-29 PROCEDURE — 86900 BLOOD TYPING SEROLOGIC ABO: CPT

## 2025-01-29 PROCEDURE — 80053 COMPREHEN METABOLIC PANEL: CPT

## 2025-01-29 PROCEDURE — 82803 BLOOD GASES ANY COMBINATION: CPT

## 2025-01-29 PROCEDURE — 5A09357 ASSISTANCE WITH RESPIRATORY VENTILATION, LESS THAN 24 CONSECUTIVE HOURS, CONTINUOUS POSITIVE AIRWAY PRESSURE: ICD-10-PCS

## 2025-01-29 PROCEDURE — 36600 WITHDRAWAL OF ARTERIAL BLOOD: CPT

## 2025-01-29 PROCEDURE — 86885 COOMBS TEST INDIRECT QUAL: CPT

## 2025-01-29 PROCEDURE — 86923 COMPATIBILITY TEST ELECTRIC: CPT

## 2025-01-29 PROCEDURE — 83735 ASSAY OF MAGNESIUM: CPT

## 2025-01-29 PROCEDURE — 70498 CT ANGIOGRAPHY NECK: CPT

## 2025-01-29 RX ORDER — METOPROLOL TARTRATE 50 MG
50 TABLET ORAL 2 TIMES DAILY
Status: DISPENSED | OUTPATIENT
Start: 2025-01-29

## 2025-01-29 RX ORDER — ONDANSETRON 4 MG/1
4 TABLET, ORALLY DISINTEGRATING ORAL EVERY 8 HOURS PRN
Status: ACTIVE | OUTPATIENT
Start: 2025-01-29

## 2025-01-29 RX ORDER — ONDANSETRON 2 MG/ML
4 INJECTION INTRAMUSCULAR; INTRAVENOUS EVERY 6 HOURS PRN
Status: ACTIVE | OUTPATIENT
Start: 2025-01-29

## 2025-01-29 RX ORDER — LISINOPRIL 20 MG/1
20 TABLET ORAL 2 TIMES DAILY
Status: DISPENSED | OUTPATIENT
Start: 2025-01-29

## 2025-01-29 RX ORDER — MULTIVITAMIN WITH IRON
1 TABLET ORAL DAILY
Status: DISPENSED | OUTPATIENT
Start: 2025-01-29

## 2025-01-29 RX ORDER — POTASSIUM CHLORIDE 29.8 MG/ML
20 INJECTION INTRAVENOUS PRN
Status: ACTIVE | OUTPATIENT
Start: 2025-01-29

## 2025-01-29 RX ORDER — MAGNESIUM SULFATE IN WATER 40 MG/ML
2000 INJECTION, SOLUTION INTRAVENOUS PRN
Status: ACTIVE | OUTPATIENT
Start: 2025-01-29

## 2025-01-29 RX ORDER — MORPHINE SULFATE 2 MG/ML
1 INJECTION, SOLUTION INTRAMUSCULAR; INTRAVENOUS
Status: DISCONTINUED | OUTPATIENT
Start: 2025-01-29 | End: 2025-01-30

## 2025-01-29 RX ORDER — B-COMPLEX WITH VITAMIN C
1 TABLET ORAL DAILY
Status: DISCONTINUED | OUTPATIENT
Start: 2025-01-29 | End: 2025-01-29 | Stop reason: SDUPTHER

## 2025-01-29 RX ORDER — POLYETHYLENE GLYCOL 3350 17 G/17G
17 POWDER, FOR SOLUTION ORAL DAILY
Status: DISPENSED | OUTPATIENT
Start: 2025-01-29

## 2025-01-29 RX ORDER — TRAMADOL HYDROCHLORIDE 50 MG/1
50 TABLET ORAL EVERY 6 HOURS PRN
Status: DISCONTINUED | OUTPATIENT
Start: 2025-01-29 | End: 2025-01-29

## 2025-01-29 RX ORDER — DOCUSATE SODIUM 100 MG/1
100 CAPSULE, LIQUID FILLED ORAL EVERY EVENING
Status: DISCONTINUED | OUTPATIENT
Start: 2025-01-29 | End: 2025-01-30

## 2025-01-29 RX ORDER — SODIUM CHLORIDE 0.9 % (FLUSH) 0.9 %
5-40 SYRINGE (ML) INJECTION PRN
Status: ACTIVE | OUTPATIENT
Start: 2025-01-29

## 2025-01-29 RX ORDER — SODIUM CHLORIDE 9 MG/ML
INJECTION, SOLUTION INTRAVENOUS PRN
Status: ACTIVE | OUTPATIENT
Start: 2025-01-29

## 2025-01-29 RX ORDER — TRAMADOL HYDROCHLORIDE 50 MG/1
50 TABLET ORAL EVERY 6 HOURS PRN
Status: DISCONTINUED | OUTPATIENT
Start: 2025-01-29 | End: 2025-01-31

## 2025-01-29 RX ORDER — ASCORBIC ACID 500 MG
500 TABLET ORAL DAILY
Status: DISPENSED | OUTPATIENT
Start: 2025-01-29

## 2025-01-29 RX ORDER — ZINC SULFATE 50(220)MG
50 CAPSULE ORAL DAILY
Status: DISPENSED | OUTPATIENT
Start: 2025-01-29

## 2025-01-29 RX ORDER — VITAMIN B COMPLEX
1000 TABLET ORAL DAILY
Status: DISPENSED | OUTPATIENT
Start: 2025-01-29

## 2025-01-29 RX ORDER — ALBUTEROL SULFATE 90 UG/1
2 INHALANT RESPIRATORY (INHALATION) EVERY 6 HOURS PRN
Status: DISPENSED | OUTPATIENT
Start: 2025-01-29

## 2025-01-29 RX ORDER — SODIUM CHLORIDE 0.9 % (FLUSH) 0.9 %
5-40 SYRINGE (ML) INJECTION EVERY 12 HOURS SCHEDULED
Status: ACTIVE | OUTPATIENT
Start: 2025-01-29

## 2025-01-29 RX ORDER — TRAMADOL HYDROCHLORIDE 50 MG/1
100 TABLET ORAL EVERY 6 HOURS PRN
Status: DISCONTINUED | OUTPATIENT
Start: 2025-01-29 | End: 2025-01-31

## 2025-01-29 RX ORDER — IOPAMIDOL 755 MG/ML
80 INJECTION, SOLUTION INTRAVASCULAR
Status: COMPLETED | OUTPATIENT
Start: 2025-01-29 | End: 2025-01-29

## 2025-01-29 RX ORDER — NALOXONE HYDROCHLORIDE 0.4 MG/ML
0.4 INJECTION, SOLUTION INTRAMUSCULAR; INTRAVENOUS; SUBCUTANEOUS PRN
Status: ACTIVE | OUTPATIENT
Start: 2025-01-29

## 2025-01-29 RX ORDER — POTASSIUM CHLORIDE 7.45 MG/ML
10 INJECTION INTRAVENOUS PRN
Status: ACTIVE | OUTPATIENT
Start: 2025-01-29

## 2025-01-29 RX ADMIN — LISINOPRIL 20 MG: 20 TABLET ORAL at 12:57

## 2025-01-29 RX ADMIN — MORPHINE SULFATE 1 MG: 2 INJECTION, SOLUTION INTRAMUSCULAR; INTRAVENOUS at 04:23

## 2025-01-29 RX ADMIN — SODIUM CHLORIDE, PRESERVATIVE FREE 10 ML: 5 INJECTION INTRAVENOUS at 18:31

## 2025-01-29 RX ADMIN — Medication 1000 UNITS: at 12:57

## 2025-01-29 RX ADMIN — TRAMADOL HYDROCHLORIDE 50 MG: 50 TABLET, COATED ORAL at 09:46

## 2025-01-29 RX ADMIN — SODIUM CHLORIDE, PRESERVATIVE FREE 10 ML: 5 INJECTION INTRAVENOUS at 21:26

## 2025-01-29 RX ADMIN — NALXONE HYDROCHLORIDE 0.4 MG: 0.4 INJECTION INTRAMUSCULAR; INTRAVENOUS; SUBCUTANEOUS at 20:02

## 2025-01-29 RX ADMIN — METOPROLOL TARTRATE 50 MG: 50 TABLET, FILM COATED ORAL at 04:25

## 2025-01-29 RX ADMIN — OXYCODONE HYDROCHLORIDE AND ACETAMINOPHEN 500 MG: 500 TABLET ORAL at 12:59

## 2025-01-29 RX ADMIN — TRAMADOL HYDROCHLORIDE 100 MG: 50 TABLET, COATED ORAL at 23:49

## 2025-01-29 RX ADMIN — TRAMADOL HYDROCHLORIDE 50 MG: 50 TABLET, COATED ORAL at 01:10

## 2025-01-29 RX ADMIN — MORPHINE SULFATE 1 MG: 2 INJECTION, SOLUTION INTRAMUSCULAR; INTRAVENOUS at 09:47

## 2025-01-29 RX ADMIN — Medication 1 TABLET: at 12:57

## 2025-01-29 RX ADMIN — IOPAMIDOL 80 ML: 755 INJECTION, SOLUTION INTRAVENOUS at 20:35

## 2025-01-29 ASSESSMENT — PAIN SCALES - GENERAL
PAINLEVEL_OUTOF10: 8
PAINLEVEL_OUTOF10: 1
PAINLEVEL_OUTOF10: 8
PAINLEVEL_OUTOF10: 7
PAINLEVEL_OUTOF10: 8
PAINLEVEL_OUTOF10: 6
PAINLEVEL_OUTOF10: 8
PAINLEVEL_OUTOF10: 10
PAINLEVEL_OUTOF10: 8

## 2025-01-29 ASSESSMENT — PAIN DESCRIPTION - ORIENTATION
ORIENTATION: LEFT

## 2025-01-29 ASSESSMENT — ENCOUNTER SYMPTOMS
ABDOMINAL DISTENTION: 0
COUGH: 0
SHORTNESS OF BREATH: 0

## 2025-01-29 ASSESSMENT — PAIN DESCRIPTION - LOCATION
LOCATION: HIP;KNEE
LOCATION: HIP
LOCATION: HIP;KNEE

## 2025-01-29 ASSESSMENT — PAIN DESCRIPTION - DESCRIPTORS
DESCRIPTORS: ACHING;DISCOMFORT
DESCRIPTORS: ACHING

## 2025-01-29 ASSESSMENT — PAIN DESCRIPTION - ONSET
ONSET: ON-GOING
ONSET: ON-GOING

## 2025-01-29 ASSESSMENT — PAIN DESCRIPTION - PAIN TYPE
TYPE: ACUTE PAIN
TYPE: ACUTE PAIN

## 2025-01-29 ASSESSMENT — PAIN DESCRIPTION - FREQUENCY
FREQUENCY: CONTINUOUS
FREQUENCY: CONTINUOUS

## 2025-01-29 ASSESSMENT — PAIN - FUNCTIONAL ASSESSMENT
PAIN_FUNCTIONAL_ASSESSMENT: PREVENTS OR INTERFERES WITH ALL ACTIVE AND SOME PASSIVE ACTIVITIES
PAIN_FUNCTIONAL_ASSESSMENT: PREVENTS OR INTERFERES SOME ACTIVE ACTIVITIES AND ADLS

## 2025-01-29 NOTE — ED NOTES
This nurse is assuming care of pt at this time. Patient resting in bed. Respirations easy and unlabored. No distress noted. Call light within reach.    Airway patent, normal appearing mouth, nose, throat, neck supple with full range of motion. moist mucosa.

## 2025-01-29 NOTE — ED PROVIDER NOTES
[facility]  eMERGENCY dEPARTMENT eNCOUnter          CHIEF COMPLAINT       Chief Complaint   Patient presents with    Fall    Hip Pain       Nurses Notes reviewed and I agree except as noted in the HPI.      HISTORY OF PRESENT ILLNESS    Gabo KAYLAN Maldonado is a 84 y.o. male who presents for left hip and left leg pain.  This is a 84-year-old gentleman who missed a step and fell backwards.  Fell onto his left hip.  Patient is describing pain around the outside of his left hip in the groin area.  And in the left knee.  Patient is able to move his toes.  He does have pain if we logroll or pick the leg up.  Patient is fully neurovascularly intact.  Patient denies hitting his head.  No loss of consciousness.  Patient is not on a blood thinner.  Denies any chest pain or shortness of breath.  Denies any recent illness or infection.  Patient states that he did have a adverse reaction to Dilaudid in the past.  Here today patient is awake alert and oriented no other physical complaints at this time.    REVIEW OF SYSTEMS     Review of Systems   Constitutional:  Negative for activity change, appetite change, diaphoresis, fatigue and unexpected weight change.   HENT:  Negative for congestion, ear discharge, ear pain, hearing loss, nosebleeds, rhinorrhea, sinus pressure, sore throat, trouble swallowing and voice change.    Eyes:  Negative for photophobia, pain, discharge, redness and itching.   Respiratory:  Negative for cough, choking, chest tightness, shortness of breath and wheezing.    Cardiovascular:  Negative for chest pain, palpitations and leg swelling.   Gastrointestinal:  Negative for abdominal distention, abdominal pain, blood in stool, constipation, diarrhea, nausea and vomiting.   Endocrine: Negative for polydipsia, polyphagia and polyuria.   Genitourinary:  Negative for decreased urine volume, difficulty urinating, dysuria, enuresis, frequency, hematuria, penile pain, penile swelling, scrotal swelling,  electronically signed by: Ck Gutierrez MD on    01/28/2025 11:25 PM      XR PELVIS (1-2 VIEWS)   Final Result   1. No acute findings.      This document has been electronically signed by: Ck Gutierrez MD on    01/28/2025 11:27 PM            LABS:   Labs Reviewed   CBC WITH AUTO DIFFERENTIAL - Abnormal; Notable for the following components:       Result Value    RBC 4.19 (*)     Hemoglobin 13.7 (*)     Hematocrit 39.5 (*)     MCV 94.3 (*)     All other components within normal limits   BRAIN NATRIURETIC PEPTIDE - Abnormal; Notable for the following components:    NT Pro-.4 (*)     All other components within normal limits   TROPONIN - Abnormal; Notable for the following components:    Troponin, High Sensitivity 22 (*)     All other components within normal limits   TSH - Abnormal; Notable for the following components:    TSH 9.700 (*)     All other components within normal limits   URINALYSIS WITH REFLEX TO CULTURE - Abnormal; Notable for the following components:    Specific Gravity, UA >1.030 (*)     All other components within normal limits   TROPONIN - Abnormal; Notable for the following components:    Troponin, High Sensitivity 24 (*)     All other components within normal limits   TROPONIN - Abnormal; Notable for the following components:    Troponin, High Sensitivity 24 (*)     All other components within normal limits   BLOOD GAS, ARTERIAL - Abnormal; Notable for the following components:    pH, Blood Gas 7.25 (*)     PCO2 83 (*)     PO2 55 (*)     HCO3 36 (*)     Base Excess (Calculated) 5.5 (*)     All other components within normal limits   COMPREHENSIVE METABOLIC PANEL - Abnormal; Notable for the following components:    Glucose 125 (*)     Sodium 132 (*)     Chloride 95 (*)     All other components within normal limits   BLOOD GAS, ARTERIAL - Abnormal; Notable for the following components:    pH, Blood Gas 7.21 (*)     PCO2 93 (*)     PO2 106 (*)     HCO3 37 (*)     Base Excess (Calculated) 6.0

## 2025-01-29 NOTE — ED NOTES
ED to inpatient nurses report      Chief Complaint:  Chief Complaint   Patient presents with    Fall    Hip Pain     Present to ED from: home by EMS    MOA:     LOC: alert and orientated to name, place, date  Mobility: Requires assistance * 2  Oxygen Baseline: room air    Current needs required: 4lpm (in his mouth - states his nose is too stuffy for nasal.)     Code Status:   Full Code    What abnormal results were found and what did you give/do to treat them? Femoral neck fx - morphine, tramadol, fentanyl for pain; labs, CT/XR  Any procedures or intervention occur? See chart    Mental Status:  Level of Consciousness: Responds to voice (1)    Psych Assessment:        Vitals:  Patient Vitals for the past 24 hrs:   BP Temp Temp src Pulse Resp SpO2 Weight   01/29/25 0548 -- -- -- 77 -- 95 % --   01/29/25 0529 -- -- -- 84 -- 95 % --   01/29/25 0517 -- -- -- 79 -- 94 % --   01/29/25 0510 (!) 117/56 -- -- 80 18 93 % --   01/29/25 0425 (!) 139/102 -- -- 88 -- -- --   01/29/25 0339 (!) 150/63 -- -- 84 16 98 % --   01/29/25 0254 (!) 153/70 -- -- 84 16 97 % --   01/29/25 0233 -- -- -- -- -- -- 126.6 kg (279 lb)   01/29/25 0209 (!) 152/72 -- -- 82 18 95 % --   01/29/25 0200 -- -- -- 82 -- 97 % --   01/29/25 0109 (!) 150/73 -- -- 83 18 98 % --   01/28/25 2358 -- -- -- 79 18 100 % --   01/28/25 2333 -- -- -- -- 18 -- --   01/28/25 2236 (!) 163/89 -- -- 71 12 93 % --   01/28/25 2136 (!) 168/101 97.9 °F (36.6 °C) Oral 74 23 96 % --        LDAs:   Peripheral IV 01/28/25 Left Forearm (Active)   Site Assessment Clean, dry & intact 01/28/25 8289       Ambulatory Status:  Presents to emergency department  because of falls (Syncope, seizure, or loss of consciousness): Yes, Age > 70: Yes, Altered Mental Status, Intoxication with alcohol or substance confusion (Disorientation, impaired judgment, poor safety awaremess, or inability to follow instructions): No, Impaired Mobility: Ambulates or transfers with assistive devices or assistance;  Unable to ambulate or transer.: Yes, Nursing Judgement: Yes    Diagnosis:  DISPOSITION Admitted 01/29/2025 12:28:03 AM   Final diagnoses:   Fall, initial encounter   Left hip pain   Inability to ambulate due to hip   Closed fracture of neck of left femur, initial encounter ([facility])        Consults:  STR ED TO IP CONSULT  IP CONSULT TO ORTHOPEDIC SURGERY     Pain Score:  Pain Assessment  Pain Assessment: 0-10  Pain Level: 10  Pain Location: Hip  Pain Orientation: Left  Pain Type: Acute pain  Pain Frequency: Continuous    C-SSRS:   Risk of Suicide: No Risk    Sepsis Screening:       [place] Fall Risk:  Kinder 1 Fall Risk  Presents to emergency department  because of falls (Syncope, seizure, or loss of consciousness): Yes  Age > 70: Yes  Altered Mental Status, Intoxication with alcohol or substance confusion (Disorientation, impaired judgment, poor safety awaremess, or inability to follow instructions): No  Impaired Mobility: Ambulates or transfers with assistive devices or assistance; Unable to ambulate or transer.: Yes  Nursing Judgement: Yes    Swallow Screening    Pass    Preferred Language:   English      ALLERGIES     Dilaudid [hydromorphone hcl] and Hydrocodone-acetaminophen    SURGICAL HISTORY       Past Surgical History:   Procedure Laterality Date    ABDOMEN SURGERY  unsure    CARDIAC CATHETERIZATION      CHOLECYSTECTOMY      COLONOSCOPY      DENTAL SURGERY      at age 20 yr    HERNIA REPAIR  2003    right inguinal    TONSILLECTOMY  1946    TONSILLECTOMY AND ADENOIDECTOMY      TOOTH EXTRACTION  unsure       PAST MEDICAL HISTORY       Past Medical History:   Diagnosis Date    Adenomatous colon polyp 2011    Blood transfusion reaction     Erectile dysfunction     History of blood transfusion     Hyperlipidemia 2005    Hypertension 2008    Other disorders of kidney and ureter in diseases classified elsewhere     Pituitary macroadenoma (HCC) 2009    Pneumonia due to COVID-19 virus 10/18/2021    Zoster 2009

## 2025-01-29 NOTE — ED NOTES
Patient resting in bed. Remains on monitor. Respirations easy and unlabored. No distress noted. Call light within reach.

## 2025-01-29 NOTE — ED TRIAGE NOTES
Patient to Ed via EMS due to falling in basement. Pt states he was putting soup away when he fell. Pain over femoral area. Slight outward rotation noted to left leg but wife states that appears normal to her. Pt hypertensive upon arrival. Wife and family members at bedside.

## 2025-01-29 NOTE — ED NOTES
Patient resting in bed. External catheter placed on patient and pants/underwear removed. Respirations easy and unlabored. No distress noted. Call light within reach.

## 2025-01-29 NOTE — PROGRESS NOTES
[facility]  Trauma Surgery -   Geriatric Protocol: Comprehensive Assessment Note      Identification of Seniors At Risk- (ISAR-R):    Screening Question No (0) Yes (+1)   1) Before the illness or injury that brought you to the hospital, did you need someone to help you on a regular basis? [x]  []    2) In the last 24 hours, have you needed more help than usual? [x]  []    3) Have you been hospitalized for one or more nights during the past six months? [x]  []    4) In general, do you have serious problems with your vision that cannot be corrected with glasses? [x]  []    5) In general, do you have serious problems with your memory? [x]  []    6) Do you take six or more different medications every day? [x]  []      Total Score: 0  Score >2 is considered positive. If negative, assessment is complete.    Conversation with caregiver: spouse.       Communication of Recommendations: Assessment and recommendations discussed with Trauma Surgeon      Electronically signed by Joseline Hernández PA-C on 1/29/25at 1:21 AM

## 2025-01-29 NOTE — PROGRESS NOTES
[facility]  OCCUPATIONAL THERAPY MISSED TREATMENT NOTE  [facility] EMERGENCY DEPARTMENT        Date: 2025  Patient Name: Gabo Maldonado        CSN: [id number]   : 1940  (84 y.o.)  Gender: male                REASON FOR MISSED TREATMENT: Orders acknowledged and chart reviewed. Pt w/ bedrest orders and plan for OR tomorrow for L hip hemiarthroplasty. Please update orders post-op and modify as needed. Will follow-up as appropriate. Thank you.

## 2025-01-29 NOTE — ED NOTES
Dr. Edward at bedside to update family and pt. Dr. Edward request this nurse try to stand pt up to see if pt is able to bear weight. Patient unable to sit up in bed without pain in left hip. Pt placed back onto bed and onto monitor.

## 2025-01-29 NOTE — H&P
Trauma History and Physical   Dr Baez     Patient:  Gabo Maldonado  Admit date: 2025   YOB: 1940 Date of Evaluation: 2025  MRN: [id number]  Acct: [id number]    Injury Date:2025  Injury time:PTA  PCP: Pedro Escobedo DO   Referring physician: Dr Edward    Time of Trauma Surgeon Notification:  1213AM 2025  Time of Trauma DINORA Arrival: 1215  Time of Trauma Surgeon Arrival:  0505 2025  Services Requested Within 30 Minutes:  Time Contacted:    Assessment:    Trauma by Fall   Impacted and nondisplaced left femoral neck fracture   Plan:    Admit to 7 under Trauma Services    Trauma by Fall   - Fall precautions   - PT, OT eval and treat    Left femoral neck fracture    - ortho consult    - bedrest    - pain meds prn    Pain control   - tramadol prn       Consults: ortho   Code status: full code   Diet:NPO  Activity:bedrest   Prophylaxis: SCDs, IS, C&DB, Pepcid, stool softeners  Repeat labs in AM  IVF hydration  PT, OT, SLP eval and treat  Discharge disposition pending clinical course   - From home   Predictive Model Details          30  Factor Value    Calculated 2025 01:36 44% Age 84 years old    Deterioration Index Model 37% Supplemental oxygen Nasal cannula     8% Systolic 150     8% Respiratory rate 18     2% Hematocrit abnormal (39.5 %)     1% Sodium 139 meq/L     1% Potassium 4.1 meq/L     1% Pulse 83     0% Pulse oximetry 98 %     0% Temperature 97.9 °F (36.6 °C)     0% WBC count 6.8 thou/mm3       Predictive Model Details   Calculating. Refresh or re-add the SmartLink.    Deterioration Index  Av.3  Min: 30.19  Max: 32.39  Deterioration Index: 30.19 (25 0131)   Activation: []Level I (Trauma Alert) []Level II (Injury Call) [x]Level III (Trauma Consult) []Downgraded  Mode of Arrival: EMS transportation  Referring Facility: N/A   Loss of Consciousness [x]No []Yes[]Unknown  Duration(min)  Mechanism of Injury:  []Motor Vehicle  TWICE A DAY    LYSINE 500 MG CAPS    Take 500 mg by mouth. 2 daily       METOPROLOL TARTRATE (LOPRESSOR) 50 MG TABLET    TAKE 1 TABLET TWICE A DAY    MISC NATURAL PRODUCTS (GLUCOSAMINE CHONDROITIN MSM PO)    Take by mouth    MULTIVITAMIN (THERAGRAN) PER TABLET    Take 1 tablet by mouth daily    ZINC PO    Take by mouth       Hospital medications:  Scheduled Meds:   sodium chloride flush  5-40 mL IntraVENous 2 times per day    polyethylene glycol  17 g Oral Daily     Continuous Infusions:   sodium chloride       PRN Meds:sodium chloride flush, sodium chloride, potassium chloride **OR** potassium chloride, magnesium sulfate, ondansetron **OR** ondansetron, traMADol  Objective   ED TRIAGE VITALS  BP: (!) 150/73, Temp: 97.9 °F (36.6 °C), Pulse: 83, Respirations: 18, SpO2: 98 %  Vanessa Coma Scale  Eye Opening: Spontaneous  Best Verbal Response: Oriented  Best Motor Response: Obeys commands  [place] Coma Scale Score: 15  Results for orders placed or performed during the hospital encounter of 01/28/25   CBC with Auto Differential   Result Value Ref Range    WBC 6.8 4.8 - 10.8 thou/mm3    RBC 4.19 (L) 4.70 - 6.10 mill/mm3    Hemoglobin 13.7 (L) 14.0 - 18.0 gm/dl    Hematocrit 39.5 (L) 42.0 - 52.0 %    MCV 94.3 (H) 80.0 - 94.0 fL    MCH 32.7 26.0 - 33.0 pg    MCHC 34.7 32.2 - 35.5 gm/dl    RDW-CV 12.7 11.5 - 14.5 %    RDW-SD 43.2 35.0 - 45.0 fL    Platelets 163 130 - 400 thou/mm3    MPV 9.9 9.4 - 12.4 fL    Seg Neutrophils 74.1 %    Lymphocytes 16.7 %    Monocytes % 7.1 %    Eosinophils 1.2 %    Basophils 0.3 %    Immature Granulocytes % 0.6 %    Neutrophils Absolute 5.0 1.8 - 7.7 thou/mm3    Lymphocytes Absolute 1.1 1.0 - 4.8 thou/mm3    Monocytes Absolute 0.5 0.4 - 1.3 thou/mm3    Eosinophils Absolute 0.1 0.0 - 0.4 thou/mm3    Basophils Absolute 0.0 0.0 - 0.1 thou/mm3    Immature Grans (Abs) 0.04 0.00 - 0.07 thou/mm3    nRBC 0 /100 wbc   Brain Natriuretic Peptide   Result Value Ref Range    NT Pro-.4 (H) 0.0 -

## 2025-01-29 NOTE — CONSULTS
Orthopaedic Consult  Patient:  Gabo Maldonado  YOB: 1940  MRN: [id number]     Acct: [id number]    PCP: Pedro Escobedo DO  Date of Admission: 1/28/2025  Date of Service: Pt seen/examined on 1/29/2025     Chief Complaint: L hip pain  History Of Present Illness: 84 y.o. male who presents with L hip pain after a mechanical fall. Patient missed the last step of his basement stair, landing directly on his left hip, did not hit head, no loc, immediate L hip pain and inability to weight bear prompting evaluation in ED, trauma workup revealed a L femoral neck fracture for which orthopaedics was consulted.  Pain is to the L groin, worsened with palpation weight bearing ROM, relieved by immobilization, radiates distally, no paresthesias or weakness. No other msk complaints.    Ambulates without assistance  No antecedent hip or thigh pain      Past Medical History:        Diagnosis Date    Adenomatous colon polyp 2011    Blood transfusion reaction     Erectile dysfunction     History of blood transfusion     Hyperlipidemia 2005    Hypertension 2008    Other disorders of kidney and ureter in diseases classified elsewhere     Pituitary macroadenoma (HCC) 2009    Pneumonia due to COVID-19 virus 10/18/2021    Zoster 2009       Past Surgical History:        Procedure Laterality Date    ABDOMEN SURGERY  unsure    CARDIAC CATHETERIZATION      CHOLECYSTECTOMY      COLONOSCOPY      DENTAL SURGERY      at age 20 yr    HERNIA REPAIR  2003    right inguinal    TONSILLECTOMY  1946    TONSILLECTOMY AND ADENOIDECTOMY      TOOTH EXTRACTION  unsure       Home Medications:   Prior to Admission medications    Medication Sig Start Date End Date Taking? Authorizing Provider   lisinopril (PRINIVIL;ZESTRIL) 20 MG tablet TAKE 1 TABLET TWICE A DAY 1/27/25   Pedro Escobedo DO   metoprolol tartrate (LOPRESSOR) 50 MG tablet TAKE 1 TABLET TWICE A DAY 1/27/25   Pedro Escobedo,    albuterol sulfate  (90

## 2025-01-29 NOTE — CONSULTS
Hospitalist Initial Consultation      Patient: Gabo Maldonado 84 y.o. male     : 1940  Admission date: 25       ASSESSMENT AND PLAN    Active Problems    Trauma  Traumatic injury that does not appear to have any underlying cause.  Denies any syncopal symptoms.  Denies any anginal symptoms.  Denies loss of consciousness.  Denies head trauma.  Per primary.  Plan to have surgery tomorrow with hemiarthroplasty.  Per primary recommendations: N.p.o.  Bedrest.  PT/OT to evaluate and treat.  Pain control per primary    Essential (primary) hypertension  Managed at home with metoprolol and lisinopril.  Patient currently hypertensive, believed to be due to pain.  Continue home medication.    Hyperlipemia  Currently stable.  Lipid panel today unrevealing.  Managed at home with no prescription medications    CAD  Patient had cardiac cath done in 2017.  Nonobstructive disease was found.  No interventions needed  Preoperative EKG showed normal sinus rhythm with PVCs.  Patient had previous workup indicating PVCs.  Monitor for anginal symptoms.  Repeat EKG if chest pain arises.    Elevation of cardiac enzymes  Elevation of troponin at 22 with repeat at 24.  Elevation of BNP at 467.  Believed to be noncardiac reasons.  Preoperative EKG showed normal sinus rhythm with PVCs.  Patient is currently asymptomatic.  Repeat EKG and troponins if chest pain arises.    Elevated TSH  Likely due to pituitary adenoma.  Asymptomatic at this time.  TSH was 9.7 on admission  Free T4 is pending.    Surgical Risk Evaluation:  Preoperative Mortality Predictor () Score  RESULT SUMMARY: 4 points  0.1 % Risk of perioperative mortality    Pre-Operative Risk assessment using 2014 ACC/AHA guidelines   Emergent procedure No  Active Cardiac Condition No (decompensated HF, Arrhythmia, MI <3 weeks, severe valve disease)  Risk Level of Procedure Intermediate Risk (intraperitoneal, intrathoracic, HENT, orthopedic, or carotid endarterectomy,

## 2025-01-29 NOTE — PROGRESS NOTES
[facility]  PHYSICAL THERAPY MISSED TREATMENT NOTE  [facility] EMERGENCY DEPARTMENT    Date: 2025  Patient Name: Gabo Maldonado        MRN: [id number]   : 1940  (84 y.o.)  Gender: male                REASON FOR MISSED TREATMENT:  Order received. Pt noted to be on Bedrest with plan for Ortho to do L hip Hemiarthroplasty tomorrow (). Will monitor for post-op orders.    Deidra Thorne, MPT [id number]

## 2025-01-30 ENCOUNTER — APPOINTMENT (OUTPATIENT)
Dept: GENERAL RADIOLOGY | Age: 85
DRG: 853 | End: 2025-01-30
Payer: MEDICARE

## 2025-01-30 ENCOUNTER — ANESTHESIA EVENT (OUTPATIENT)
Dept: OPERATING ROOM | Age: 85
End: 2025-01-30
Payer: MEDICARE

## 2025-01-30 ENCOUNTER — APPOINTMENT (OUTPATIENT)
Age: 85
DRG: 853 | End: 2025-01-30
Attending: FAMILY MEDICINE
Payer: MEDICARE

## 2025-01-30 ENCOUNTER — APPOINTMENT (OUTPATIENT)
Dept: MRI IMAGING | Age: 85
DRG: 853 | End: 2025-01-30
Payer: MEDICARE

## 2025-01-30 ENCOUNTER — ANESTHESIA (OUTPATIENT)
Dept: OPERATING ROOM | Age: 85
End: 2025-01-30
Payer: MEDICARE
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
## 2025-01-30 PROBLEM — R41.89 DECREASED LEVEL OF CONSCIOUSNESS: Status: ACTIVE | Noted: 2025-01-30

## 2025-01-30 LAB
ACINETOBACTER CALCOACETICUS-BAUMANNII BY PCR: NOT DETECTED
AMPHETAMINES UR QL SCN: NEGATIVE
ANION GAP SERPL CALC-SCNC: 12 MEQ/L (ref 8–16)
ANION GAP SERPL CALC-SCNC: 9 MEQ/L (ref 8–16)
ARTERIAL PATENCY WRIST A: POSITIVE
BARBITURATES UR QL SCN: NEGATIVE
BASE EXCESS BLDA CALC-SCNC: -0.5 MMOL/L (ref -2–3)
BASE EXCESS BLDA CALC-SCNC: 0.9 MMOL/L (ref -2.5–2.5)
BASE EXCESS BLDA CALC-SCNC: 2 MMOL/L (ref -2.5–2.5)
BASE EXCESS BLDA CALC-SCNC: 3.7 MMOL/L (ref -2.5–2.5)
BASE EXCESS BLDA CALC-SCNC: 6 MMOL/L (ref -2.5–2.5)
BDY SITE: ABNORMAL
BENZODIAZ UR QL SCN: NEGATIVE
BILIRUB UR QL STRIP.AUTO: NEGATIVE
BLACTX-M ISLT/SPM QL: ABNORMAL
BLAIMP ISLT/SPM QL: ABNORMAL
BLAKPC ISLT/SPM QL: ABNORMAL
BLAOXA-48-LIKE ISLT/SPM QL: ABNORMAL
BLAVIM ISLT/SPM QL: ABNORMAL
BUN SERPL-MCNC: 23 MG/DL (ref 7–22)
BUN SERPL-MCNC: 30 MG/DL (ref 7–22)
BZE UR QL SCN: NEGATIVE
C PNEUM DNA LOWER RESP QL NAA+NON-PROBE: NOT DETECTED
CALCIUM SERPL-MCNC: 8.9 MG/DL (ref 8.5–10.5)
CALCIUM SERPL-MCNC: 9.2 MG/DL (ref 8.5–10.5)
CANNABINOIDS UR QL SCN: NEGATIVE
CHARACTER UR: CLEAR
CHLORIDE SERPL-SCNC: 97 MEQ/L (ref 98–111)
CHLORIDE SERPL-SCNC: 99 MEQ/L (ref 98–111)
CHOLEST SERPL-MCNC: 198 MG/DL (ref 100–199)
CO2 SERPL-SCNC: 25 MEQ/L (ref 23–33)
CO2 SERPL-SCNC: 30 MEQ/L (ref 23–33)
COLLECTED BY:: ABNORMAL
COLOR, UA: YELLOW
CREAT SERPL-MCNC: 1.3 MG/DL (ref 0.4–1.2)
CREAT SERPL-MCNC: 1.8 MG/DL (ref 0.4–1.2)
CREAT UR-MCNC: 106 MG/DL
DEPRECATED MEAN GLUCOSE BLD GHB EST-ACNC: 99 MG/DL (ref 70–126)
DEPRECATED RDW RBC AUTO: 46.9 FL (ref 35–45)
DEPRECATED RDW RBC AUTO: 48.1 FL (ref 35–45)
DEVICE: ABNORMAL
ECHO AV CUSP MM: 1.9 CM
ECHO AV PEAK GRADIENT: 22 MMHG
ECHO AV PEAK VELOCITY: 2.4 M/S
ECHO BSA: 2.46 M2
ECHO LA AREA 2C: 18.3 CM2
ECHO LA AREA 4C: 18.7 CM2
ECHO LA DIAMETER INDEX: 1.4 CM/M2
ECHO LA DIAMETER: 3.3 CM
ECHO LA MAJOR AXIS: 6.1 CM
ECHO LA MINOR AXIS: 4.5 CM
ECHO LA VOL MOD A2C: 59 ML (ref 18–58)
ECHO LA VOL MOD A4C: 46 ML (ref 18–58)
ECHO LA VOLUME INDEX MOD A2C: 25 ML/M2 (ref 16–34)
ECHO LA VOLUME INDEX MOD A4C: 20 ML/M2 (ref 16–34)
ECHO LV E' LATERAL VELOCITY: 5.4 CM/S
ECHO LV E' SEPTAL VELOCITY: 4.1 CM/S
ECHO LV EF PHYSICIAN: 65 %
ECHO LV EJECTION FRACTION BIPLANE: 70 % (ref 55–100)
ECHO LV FRACTIONAL SHORTENING: 9 % (ref 28–44)
ECHO LV INTERNAL DIMENSION DIASTOLE INDEX: 1.87 CM/M2
ECHO LV INTERNAL DIMENSION DIASTOLIC: 4.4 CM (ref 4.2–5.9)
ECHO LV INTERNAL DIMENSION SYSTOLIC INDEX: 1.7 CM/M2
ECHO LV INTERNAL DIMENSION SYSTOLIC: 4 CM
ECHO LV IVSD: 1.4 CM (ref 0.6–1)
ECHO LV MASS 2D: 240.3 G (ref 88–224)
ECHO LV MASS INDEX 2D: 102.2 G/M2 (ref 49–115)
ECHO LV POSTERIOR WALL DIASTOLIC: 1.4 CM (ref 0.6–1)
ECHO LV RELATIVE WALL THICKNESS RATIO: 0.64
ECHO MV A VELOCITY: 1.16 M/S
ECHO MV E DECELERATION TIME (DT): 324 MS
ECHO MV E VELOCITY: 1.01 M/S
ECHO MV E/A RATIO: 0.87
ECHO MV E/E' LATERAL: 18.7
ECHO MV E/E' RATIO (AVERAGED): 21.67
ECHO MV E/E' SEPTAL: 24.63
ECHO PULMONARY ARTERY END DIASTOLIC PRESSURE: 14 MMHG
ECHO PV MAX VELOCITY: 1 M/S
ECHO PV PEAK GRADIENT: 4 MMHG
ECHO PV REGURGITANT MAX VELOCITY: 1.9 M/S
ECHO RV INTERNAL DIMENSION: 3.1 CM
ECHO TV E WAVE: 0.3 M/S
ECHO TV REGURGITANT MAX VELOCITY: 2.37 M/S
ECHO TV REGURGITANT PEAK GRADIENT: 22 MMHG
EKG Q-T INTERVAL: 342 MS
EKG QRS DURATION: 88 MS
EKG QTC CALCULATION (BAZETT): 399 MS
EKG R AXIS: -2 DEGREES
EKG T AXIS: 40 DEGREES
EKG VENTRICULAR RATE: 82 BPM
ENTEROBACTER CLOACAE COMPLEX BY PCR: NOT DETECTED
ERYTHROCYTE [DISTWIDTH] IN BLOOD BY AUTOMATED COUNT: 13.2 % (ref 11.5–14.5)
ERYTHROCYTE [DISTWIDTH] IN BLOOD BY AUTOMATED COUNT: 13.2 % (ref 11.5–14.5)
ESCHERICHIA COLI BY PCR: NOT DETECTED
FENTANYL: POSITIVE
FIO2 ON VENT O2 ANALYZER: 50 %
FIO2 ON VENT O2 ANALYZER: 80 %
FLUAV RNA LOWER RESP QL NAA+NON-PROBE: NOT DETECTED
FLUBV RNA LOWER RESP QL NAA+NON-PROBE: NOT DETECTED
GFR SERPL CREATININE-BSD FRML MDRD: 36 ML/MIN/1.73M2
GFR SERPL CREATININE-BSD FRML MDRD: 54 ML/MIN/1.73M2
GLUCOSE BLD STRIP.AUTO-MCNC: 136 MG/DL (ref 70–108)
GLUCOSE SERPL-MCNC: 125 MG/DL (ref 70–108)
GLUCOSE SERPL-MCNC: 146 MG/DL (ref 70–108)
GLUCOSE UR QL STRIP.AUTO: NEGATIVE MG/DL
HADV DNA LOWER RESP QL NAA+NON-PROBE: NOT DETECTED
HAEMOPHILUS INFLUENZAE BY PCR: NOT DETECTED
HBA1C MFR BLD HPLC: 5.3 % (ref 4.4–6.4)
HCO3 BLDA-SCNC: 24 MMOL/L (ref 23–28)
HCO3 BLDA-SCNC: 34 MMOL/L (ref 23–28)
HCO3 BLDA-SCNC: 34 MMOL/L (ref 23–28)
HCO3 BLDA-SCNC: 36 MMOL/L (ref 23–28)
HCO3 BLDA-SCNC: 37 MMOL/L (ref 23–28)
HCOV RNA LOWER RESP QL NAA+NON-PROBE: NOT DETECTED
HCT VFR BLD AUTO: 40.5 % (ref 42–52)
HCT VFR BLD AUTO: 42.1 % (ref 42–52)
HCT VFR BLD AUTO: 42.2 % (ref 42–52)
HDLC SERPL-MCNC: 53 MG/DL
HGB BLD-MCNC: 13.4 GM/DL (ref 14–18)
HGB BLD-MCNC: 13.6 GM/DL (ref 14–18)
HGB BLD-MCNC: 14.1 GM/DL (ref 14–18)
HGB UR QL STRIP.AUTO: NEGATIVE
HMPV RNA LOWER RESP QL NAA+NON-PROBE: NOT DETECTED
HPIV RNA LOWER RESP QL NAA+NON-PROBE: NOT DETECTED
KETONES UR QL STRIP.AUTO: NEGATIVE
KLEBSIELLA AEROGENES BY PCR: NOT DETECTED
KLEBSIELLA OXYTOCA BY PCR: NOT DETECTED
KLEBSIELLA PNEUMONIAE GROUP BY PCR: NOT DETECTED
L PNEUMO DNA LOWER RESP QL NAA+NON-PROBE: NOT DETECTED
LACTATE SERPL-SCNC: 1.9 MMOL/L (ref 0.5–2)
LDLC SERPL CALC-MCNC: 118 MG/DL
M PNEUMO DNA LOWER RESP QL NAA+NON-PROBE: NOT DETECTED
MAGNESIUM SERPL-MCNC: 2.1 MG/DL (ref 1.6–2.4)
MCH RBC QN AUTO: 32.2 PG (ref 26–33)
MCH RBC QN AUTO: 32.6 PG (ref 26–33)
MCHC RBC AUTO-ENTMCNC: 32.3 GM/DL (ref 32.2–35.5)
MCHC RBC AUTO-ENTMCNC: 33.1 GM/DL (ref 32.2–35.5)
MCV RBC AUTO: 98.5 FL (ref 80–94)
MCV RBC AUTO: 99.5 FL (ref 80–94)
MORAXELLA CATARRHALIS BY PCR: DETECTED
NITRITE UR QL STRIP: NEGATIVE
OPIATES UR QL SCN: NEGATIVE
OXYCODONE: NEGATIVE
PCO2 BLDA: 128 MMHG (ref 35–45)
PCO2 BLDA: 81 MMHG (ref 35–45)
PCO2 BLDA: 93 MMHG (ref 35–45)
PCO2 BLDA: 99 MMHG (ref 35–45)
PCO2 TEMP ADJ BLDMV: 37 MMHG (ref 41–51)
PCP UR QL SCN: NEGATIVE
PEEP SETTING VENT: 5 MMHG
PH BLDA: 7.06 [PH] (ref 7.35–7.45)
PH BLDA: 7.15 [PH] (ref 7.35–7.45)
PH BLDA: 7.21 [PH] (ref 7.35–7.45)
PH BLDA: 7.23 [PH] (ref 7.35–7.45)
PH BLDMV: 7.42 [PH] (ref 7.31–7.41)
PH UR STRIP.AUTO: 5.5 [PH] (ref 5–9)
PHOSPHATE SERPL-MCNC: 5.4 MG/DL (ref 2.4–4.7)
PIP: 28 CMH2O
PLATELET # BLD AUTO: 166 THOU/MM3 (ref 130–400)
PLATELET # BLD AUTO: 191 THOU/MM3 (ref 130–400)
PMV BLD AUTO: 10 FL (ref 9.4–12.4)
PMV BLD AUTO: 9.8 FL (ref 9.4–12.4)
PO2 BLDA: 106 MMHG (ref 71–104)
PO2 BLDA: 127 MMHG (ref 71–104)
PO2 BLDA: 55 MMHG (ref 71–104)
PO2 BLDA: 74 MMHG (ref 71–104)
PO2 BLDMV: 33 MMHG (ref 25–40)
POTASSIUM SERPL-SCNC: 5.1 MEQ/L (ref 3.5–5.2)
POTASSIUM SERPL-SCNC: 5.2 MEQ/L (ref 3.5–5.2)
PROT UR STRIP.AUTO-MCNC: NEGATIVE MG/DL
PROTEUS SPECIES BY PCR: NOT DETECTED
PSEUDOMONAS AERUGINOSA BY PCR: NOT DETECTED
RBC # BLD AUTO: 4.11 MILL/MM3 (ref 4.7–6.1)
RBC # BLD AUTO: 4.23 MILL/MM3 (ref 4.7–6.1)
RESISTANT GENE MECA/C & MREJ BY PCR: ABNORMAL
RESISTANT GENE NDM BY PCR: ABNORMAL
RSV RNA LOWER RESP QL NAA+NON-PROBE: NOT DETECTED
RV+EV RNA LOWER RESP QL NAA+NON-PROBE: NOT DETECTED
SAO2 % BLDA: 76 %
SAO2 % BLDA: 90 %
SAO2 % BLDA: 96 %
SAO2 % BLDA: 96 %
SAO2 % BLDMV: 65 %
SERRATIA MARCESCENS BY PCR: NOT DETECTED
SET PEEP: 10 MMHG
SET RESPIRATORY RATE: 12 BPM
SITE: ABNORMAL
SODIUM SERPL-SCNC: 136 MEQ/L (ref 135–145)
SODIUM SERPL-SCNC: 136 MEQ/L (ref 135–145)
SODIUM UR-SCNC: 74 MEQ/L
SOURCE: ABNORMAL
SP GR UR REFRACT.AUTO: > 1.03 (ref 1–1.03)
SPECIMEN ACCEPTABILITY: ABNORMAL
STAPH AUREUS BY PCR: NOT DETECTED
STREP AGALACTIAE BY PCR: NOT DETECTED
STREP PNEUMONIAE BY PCR: NOT DETECTED
STREP PYOGENES BY PCR: NOT DETECTED
TRIGL SERPL-MCNC: 137 MG/DL (ref 0–199)
UROBILINOGEN, URINE: 0.2 EU/DL (ref 0–1)
VENTILATION MODE VENT: ABNORMAL
VENTILATION MODE VENT: AC
WBC # BLD AUTO: 11.6 THOU/MM3 (ref 4.8–10.8)
WBC # BLD AUTO: 12.7 THOU/MM3 (ref 4.8–10.8)
WBC #/AREA URNS HPF: NEGATIVE /[HPF]

## 2025-01-30 PROCEDURE — 36600 WITHDRAWAL OF ARTERIAL BLOOD: CPT

## 2025-01-30 PROCEDURE — 6360000002 HC RX W HCPCS

## 2025-01-30 PROCEDURE — 2500000003 HC RX 250 WO HCPCS: Performed by: PHYSICIAN ASSISTANT

## 2025-01-30 PROCEDURE — 93306 TTE W/DOPPLER COMPLETE: CPT

## 2025-01-30 PROCEDURE — 99291 CRITICAL CARE FIRST HOUR: CPT | Performed by: INTERNAL MEDICINE

## 2025-01-30 PROCEDURE — 2580000003 HC RX 258

## 2025-01-30 PROCEDURE — 36415 COLL VENOUS BLD VENIPUNCTURE: CPT

## 2025-01-30 PROCEDURE — 85027 COMPLETE CBC AUTOMATED: CPT

## 2025-01-30 PROCEDURE — 36430 TRANSFUSION BLD/BLD COMPNT: CPT

## 2025-01-30 PROCEDURE — 70551 MRI BRAIN STEM W/O DYE: CPT

## 2025-01-30 PROCEDURE — 80048 BASIC METABOLIC PNL TOTAL CA: CPT

## 2025-01-30 PROCEDURE — 85018 HEMOGLOBIN: CPT

## 2025-01-30 PROCEDURE — 71045 X-RAY EXAM CHEST 1 VIEW: CPT

## 2025-01-30 PROCEDURE — 89220 SPUTUM SPECIMEN COLLECTION: CPT

## 2025-01-30 PROCEDURE — 80307 DRUG TEST PRSMV CHEM ANLYZR: CPT

## 2025-01-30 PROCEDURE — 87798 DETECT AGENT NOS DNA AMP: CPT

## 2025-01-30 PROCEDURE — 82570 ASSAY OF URINE CREATININE: CPT

## 2025-01-30 PROCEDURE — 94660 CPAP INITIATION&MGMT: CPT

## 2025-01-30 PROCEDURE — 83036 HEMOGLOBIN GLYCOSYLATED A1C: CPT

## 2025-01-30 PROCEDURE — 93005 ELECTROCARDIOGRAM TRACING: CPT

## 2025-01-30 PROCEDURE — 87581 M.PNEUMON DNA AMP PROBE: CPT

## 2025-01-30 PROCEDURE — 87486 CHLMYD PNEUM DNA AMP PROBE: CPT

## 2025-01-30 PROCEDURE — 31500 INSERT EMERGENCY AIRWAY: CPT

## 2025-01-30 PROCEDURE — 84100 ASSAY OF PHOSPHORUS: CPT

## 2025-01-30 PROCEDURE — 83735 ASSAY OF MAGNESIUM: CPT

## 2025-01-30 PROCEDURE — 82803 BLOOD GASES ANY COMBINATION: CPT

## 2025-01-30 PROCEDURE — 94002 VENT MGMT INPAT INIT DAY: CPT

## 2025-01-30 PROCEDURE — 84300 ASSAY OF URINE SODIUM: CPT

## 2025-01-30 PROCEDURE — 99223 1ST HOSP IP/OBS HIGH 75: CPT

## 2025-01-30 PROCEDURE — 5A1935Z RESPIRATORY VENTILATION, LESS THAN 24 CONSECUTIVE HOURS: ICD-10-PCS | Performed by: PHYSICIAN ASSISTANT

## 2025-01-30 PROCEDURE — 36556 INSERT NON-TUNNEL CV CATH: CPT | Performed by: NURSE PRACTITIONER

## 2025-01-30 PROCEDURE — 81003 URINALYSIS AUTO W/O SCOPE: CPT

## 2025-01-30 PROCEDURE — 87077 CULTURE AEROBIC IDENTIFY: CPT

## 2025-01-30 PROCEDURE — 85014 HEMATOCRIT: CPT

## 2025-01-30 PROCEDURE — 6370000000 HC RX 637 (ALT 250 FOR IP)

## 2025-01-30 PROCEDURE — 82948 REAGENT STRIP/BLOOD GLUCOSE: CPT

## 2025-01-30 PROCEDURE — 87541 LEGION PNEUMO DNA AMP PROB: CPT

## 2025-01-30 PROCEDURE — 2500000003 HC RX 250 WO HCPCS

## 2025-01-30 PROCEDURE — 87070 CULTURE OTHR SPECIMN AEROBIC: CPT

## 2025-01-30 PROCEDURE — 3E043XZ INTRODUCTION OF VASOPRESSOR INTO CENTRAL VEIN, PERCUTANEOUS APPROACH: ICD-10-PCS

## 2025-01-30 PROCEDURE — 87631 RESP VIRUS 3-5 TARGETS: CPT

## 2025-01-30 PROCEDURE — 02H633Z INSERTION OF INFUSION DEVICE INTO RIGHT ATRIUM, PERCUTANEOUS APPROACH: ICD-10-PCS

## 2025-01-30 PROCEDURE — 80061 LIPID PANEL: CPT

## 2025-01-30 PROCEDURE — 87205 SMEAR GRAM STAIN: CPT

## 2025-01-30 PROCEDURE — 83605 ASSAY OF LACTIC ACID: CPT

## 2025-01-30 PROCEDURE — 2000000000 HC ICU R&B

## 2025-01-30 PROCEDURE — 2580000003 HC RX 258: Performed by: NURSE PRACTITIONER

## 2025-01-30 PROCEDURE — 2500000003 HC RX 250 WO HCPCS: Performed by: STUDENT IN AN ORGANIZED HEALTH CARE EDUCATION/TRAINING PROGRAM

## 2025-01-30 PROCEDURE — 0SRB0JA REPLACEMENT OF LEFT HIP JOINT WITH SYNTHETIC SUBSTITUTE, UNCEMENTED, OPEN APPROACH: ICD-10-PCS | Performed by: ORTHOPAEDIC SURGERY

## 2025-01-30 PROCEDURE — 0BH17EZ INSERTION OF ENDOTRACHEAL AIRWAY INTO TRACHEA, VIA NATURAL OR ARTIFICIAL OPENING: ICD-10-PCS

## 2025-01-30 PROCEDURE — 93306 TTE W/DOPPLER COMPLETE: CPT | Performed by: INTERNAL MEDICINE

## 2025-01-30 PROCEDURE — P9016 RBC LEUKOCYTES REDUCED: HCPCS

## 2025-01-30 RX ORDER — 0.9 % SODIUM CHLORIDE 0.9 %
500 INTRAVENOUS SOLUTION INTRAVENOUS ONCE
Status: COMPLETED | OUTPATIENT
Start: 2025-01-30 | End: 2025-01-30

## 2025-01-30 RX ORDER — MIDAZOLAM HYDROCHLORIDE 1 MG/ML
INJECTION, SOLUTION INTRAMUSCULAR; INTRAVENOUS
Status: COMPLETED | OUTPATIENT
Start: 2025-01-30 | End: 2025-01-30

## 2025-01-30 RX ORDER — FENTANYL CITRATE 50 UG/ML
100 INJECTION, SOLUTION INTRAMUSCULAR; INTRAVENOUS ONCE
Status: DISCONTINUED | OUTPATIENT
Start: 2025-01-30 | End: 2025-02-02

## 2025-01-30 RX ORDER — FENTANYL CITRATE-0.9 % NACL/PF 10 MCG/ML
25-200 PLASTIC BAG, INJECTION (ML) INTRAVENOUS CONTINUOUS
Status: DISCONTINUED | OUTPATIENT
Start: 2025-01-30 | End: 2025-01-31

## 2025-01-30 RX ORDER — ETOMIDATE 2 MG/ML
20 INJECTION INTRAVENOUS ONCE
Status: DISCONTINUED | OUTPATIENT
Start: 2025-01-30 | End: 2025-02-02

## 2025-01-30 RX ORDER — DEXTROSE MONOHYDRATE 100 MG/ML
INJECTION, SOLUTION INTRAVENOUS CONTINUOUS PRN
Status: ACTIVE | OUTPATIENT
Start: 2025-01-30

## 2025-01-30 RX ORDER — PROPOFOL 10 MG/ML
5-50 INJECTION, EMULSION INTRAVENOUS CONTINUOUS
Status: DISCONTINUED | OUTPATIENT
Start: 2025-01-30 | End: 2025-01-31

## 2025-01-30 RX ORDER — FENTANYL CITRATE 50 UG/ML
50 INJECTION, SOLUTION INTRAMUSCULAR; INTRAVENOUS
Status: DISCONTINUED | OUTPATIENT
Start: 2025-01-30 | End: 2025-01-31

## 2025-01-30 RX ORDER — ETOMIDATE 2 MG/ML
INJECTION INTRAVENOUS
Status: COMPLETED | OUTPATIENT
Start: 2025-01-30 | End: 2025-01-30

## 2025-01-30 RX ORDER — HYDROCORTISONE SODIUM SUCCINATE 100 MG/2ML
100 INJECTION INTRAMUSCULAR; INTRAVENOUS EVERY 8 HOURS
Status: DISCONTINUED | OUTPATIENT
Start: 2025-01-30 | End: 2025-02-02

## 2025-01-30 RX ORDER — SODIUM CHLORIDE 9 MG/ML
INJECTION, SOLUTION INTRAVENOUS CONTINUOUS
Status: ACTIVE | OUTPATIENT
Start: 2025-01-30 | End: 2025-01-31

## 2025-01-30 RX ORDER — FENTANYL CITRATE 50 UG/ML
INJECTION, SOLUTION INTRAMUSCULAR; INTRAVENOUS
Status: COMPLETED | OUTPATIENT
Start: 2025-01-30 | End: 2025-01-30

## 2025-01-30 RX ORDER — GLUCAGON 1 MG/ML
1 KIT INJECTION PRN
Status: ACTIVE | OUTPATIENT
Start: 2025-01-30

## 2025-01-30 RX ORDER — FENTANYL CITRATE-0.9 % NACL/PF 10 MCG/ML
PLASTIC BAG, INJECTION (ML) INTRAVENOUS
Status: COMPLETED
Start: 2025-01-30 | End: 2025-01-30

## 2025-01-30 RX ORDER — NOREPINEPHRINE BITARTRATE 0.06 MG/ML
INJECTION, SOLUTION INTRAVENOUS
Status: COMPLETED
Start: 2025-01-30 | End: 2025-01-30

## 2025-01-30 RX ORDER — BUDESONIDE AND FORMOTEROL FUMARATE DIHYDRATE 160; 4.5 UG/1; UG/1
2 AEROSOL RESPIRATORY (INHALATION)
Status: DISPENSED | OUTPATIENT
Start: 2025-01-30

## 2025-01-30 RX ORDER — NOREPINEPHRINE BITARTRATE 0.06 MG/ML
1-100 INJECTION, SOLUTION INTRAVENOUS CONTINUOUS
Status: DISCONTINUED | OUTPATIENT
Start: 2025-01-30 | End: 2025-02-01

## 2025-01-30 RX ORDER — CHLORHEXIDINE GLUCONATE ORAL RINSE 1.2 MG/ML
15 SOLUTION DENTAL 2 TIMES DAILY
Status: DISPENSED | OUTPATIENT
Start: 2025-01-30

## 2025-01-30 RX ORDER — DEXMEDETOMIDINE HYDROCHLORIDE 4 UG/ML
.1-1.5 INJECTION, SOLUTION INTRAVENOUS CONTINUOUS
Status: DISCONTINUED | OUTPATIENT
Start: 2025-01-30 | End: 2025-01-31

## 2025-01-30 RX ORDER — SODIUM CHLORIDE 9 MG/ML
INJECTION, SOLUTION INTRAVENOUS PRN
Status: ACTIVE | OUTPATIENT
Start: 2025-01-30

## 2025-01-30 RX ORDER — PANTOPRAZOLE SODIUM 40 MG/10ML
40 INJECTION, POWDER, LYOPHILIZED, FOR SOLUTION INTRAVENOUS DAILY
Status: DISPENSED | OUTPATIENT
Start: 2025-01-30

## 2025-01-30 RX ORDER — MIDAZOLAM HYDROCHLORIDE 1 MG/ML
4 INJECTION, SOLUTION INTRAMUSCULAR; INTRAVENOUS ONCE
Status: DISCONTINUED | OUTPATIENT
Start: 2025-01-30 | End: 2025-02-02

## 2025-01-30 RX ADMIN — SODIUM CHLORIDE: 9 INJECTION, SOLUTION INTRAVENOUS at 09:56

## 2025-01-30 RX ADMIN — MIDAZOLAM 2 MG: 1 INJECTION INTRAMUSCULAR; INTRAVENOUS at 14:50

## 2025-01-30 RX ADMIN — PANTOPRAZOLE SODIUM 40 MG: 40 INJECTION, POWDER, FOR SOLUTION INTRAVENOUS at 17:41

## 2025-01-30 RX ADMIN — Medication 50 MCG/HR: at 20:24

## 2025-01-30 RX ADMIN — HYDROCORTISONE SODIUM SUCCINATE 100 MG: 100 INJECTION, POWDER, FOR SOLUTION INTRAMUSCULAR; INTRAVENOUS at 17:03

## 2025-01-30 RX ADMIN — SODIUM CHLORIDE 500 ML: 9 INJECTION, SOLUTION INTRAVENOUS at 15:07

## 2025-01-30 RX ADMIN — PROPOFOL 10 MCG/KG/MIN: 10 INJECTION, EMULSION INTRAVENOUS at 15:08

## 2025-01-30 RX ADMIN — CHLORHEXIDINE GLUCONATE 15 ML: 1.2 RINSE ORAL at 22:04

## 2025-01-30 RX ADMIN — SODIUM CHLORIDE, PRESERVATIVE FREE 10 ML: 5 INJECTION INTRAVENOUS at 22:05

## 2025-01-30 RX ADMIN — AZITHROMYCIN MONOHYDRATE 500 MG: 500 INJECTION, POWDER, LYOPHILIZED, FOR SOLUTION INTRAVENOUS at 17:00

## 2025-01-30 RX ADMIN — FENTANYL CITRATE 50 MCG: 50 INJECTION, SOLUTION INTRAMUSCULAR; INTRAVENOUS at 14:51

## 2025-01-30 RX ADMIN — Medication 5 MCG/MIN: at 14:53

## 2025-01-30 RX ADMIN — HYDROCORTISONE SODIUM SUCCINATE 100 MG: 100 INJECTION, POWDER, FOR SOLUTION INTRAMUSCULAR; INTRAVENOUS at 23:58

## 2025-01-30 RX ADMIN — ETOMIDATE 20 MG: 2 INJECTION INTRAVENOUS at 14:51

## 2025-01-30 RX ADMIN — SODIUM CHLORIDE: 9 INJECTION, SOLUTION INTRAVENOUS at 15:55

## 2025-01-30 RX ADMIN — WATER 2000 MG: 1 INJECTION INTRAMUSCULAR; INTRAVENOUS; SUBCUTANEOUS at 17:10

## 2025-01-30 RX ADMIN — Medication 0.2 MCG/KG/HR: at 18:19

## 2025-01-30 ASSESSMENT — PAIN DESCRIPTION - DESCRIPTORS: DESCRIPTORS: ACHING

## 2025-01-30 ASSESSMENT — PAIN DESCRIPTION - PAIN TYPE: TYPE: ACUTE PAIN

## 2025-01-30 ASSESSMENT — PAIN DESCRIPTION - ORIENTATION: ORIENTATION: LEFT

## 2025-01-30 ASSESSMENT — PAIN SCALES - GENERAL
PAINLEVEL_OUTOF10: 5
PAINLEVEL_OUTOF10: 0
PAINLEVEL_OUTOF10: 5

## 2025-01-30 ASSESSMENT — PULMONARY FUNCTION TESTS
PIF_VALUE: 27
PIF_VALUE: 27

## 2025-01-30 ASSESSMENT — PAIN DESCRIPTION - ONSET: ONSET: ON-GOING

## 2025-01-30 ASSESSMENT — PAIN - FUNCTIONAL ASSESSMENT: PAIN_FUNCTIONAL_ASSESSMENT: PREVENTS OR INTERFERES WITH MANY ACTIVE NOT PASSIVE ACTIVITIES

## 2025-01-30 ASSESSMENT — PAIN DESCRIPTION - FREQUENCY: FREQUENCY: CONTINUOUS

## 2025-01-30 ASSESSMENT — PAIN DESCRIPTION - LOCATION: LOCATION: HIP

## 2025-01-30 NOTE — PROGRESS NOTES
[facility]  PHYSICAL THERAPY MISSED TREATMENT NOTE  [facility]    Date: 2025  Patient Name: Gabo Maldonado        MRN: [id number]   : 1940  (84 y.o.)  Gender: male                REASON FOR MISSED TREATMENT:  Missed Treat.      Orders acknowledged and chart reviewed. Pt w/ bedrest orders and plan for OR today for L hip hemiarthroplasty. Please update orders post-op and modify as needed. Will follow-up as appropriate. Thank you.

## 2025-01-30 NOTE — PROGRESS NOTES
Patient transferred to  by [facility] and [facility]. Patient's daughter had called for an update on how he was doing due to his status when they had left today. Updated her on rapid response/code stroke and transferred phone call to  for further update.

## 2025-01-30 NOTE — CONSULTS
CRITICAL CARE CONSULT NOTE      Patient:  Gabo Maldonado    Unit/Bed:4D-09/009-A  MRN: [id number]   PCP: Pedro Escobedo DO  Date of Admission: 1/28/2025    Assessment and Plan(All pulmonary edema, renal failure, PE, and respiratory failure diagnoses are acute in nature unless otherwise specified):        Acute hypercapnic respiratory failure  Patient with increasing CO2 despite BiPAP therapy  83-->93-->99-->81-->128  COPD exacerbation versus pneumonia  Azithromycin  Rocephin  Steroids  Symbicort  Spiriva  Pneumonia panel respiratory cultures pending  Elevated white blood cell at 12.7  Patient afebrile  Hypotension  Hemorrhagic versus septic  Potentially in the setting of left femoral neck fracture  Will give 1 unit packed red blood cells  Trend H&H every 6 hours  Elevated white blood cell count at 12.7  Pneumonia respiratory panel sent  Ceftriaxone, azithromycin, steroids  Levophed currently at 15 mcgs  Left femoral neck fracture  Orthopedic consulted  With plan for hemiarthroplasty, but developed stroke symptoms  Surgery TBD  Hold anticoagulation  Hypertension  Home medications, held in the setting of hypotension  Lisinopril  Metoprolol  Hyperlipidemia  Lipid panel pending  No home medications  Stroke, rule out  Stroke alert called on 1/29/2025  Glucose within normal limits  Patient paralysis and limb ataxia  CT head negative  CTA head and neck negative  MRI ordered, will review  Patient given Narcan with improvement in symptomology  No intervention at this time  Paroxysmal atrial fibrillation  [id number] 3  No history of  Continuous telemetry  Anticoagulation held in the setting of orthopedic injury as stated above  PETRA  Baseline creatinine 0.8-0.9  Current BUN and creatinine 23 and 1.3  Urine studies ordered, will review the results  Diastolic heart failure with preserved ejection fraction  Echocardiogram 1/30/2025, consistent with echocardiogram from 5 years ago  EF 60 to 65%  Diastolic dysfunction

## 2025-01-30 NOTE — PROGRESS NOTES
Hospitalist Consult Progress Note  Internal Medicine Resident      Patient: Gabo Maldonado 84 y.o. male      Unit/Bed: -09/009-A  Admit Date: 1/28/2025     ASSESSMENT AND PLAN  Acute Hypoxic Respiratory Failure with Hypercapnia  Obstructive Sleep Apnea, Suspected  BMI 42.0-42.99  -Multifactorial: MAGEU, Medication AE  -01/29/2025 CTA chest with and without contrast showing no evidence of acute pulmonary embolism; mild dilated central pulmonary arteries  -Patient failed BiPAP trial on floor, patient's family consented for elective intubation prior to orthopedic surgery intervention  -S/P intubation and mechanical ventilation 01/30/2025; transferred to intensive care unit for further evaluation  -Recommendations for outpatient follow up with sleep center, ENT for further evaluation of sleep disordered breathing    CVA, Rule Out  -Stroke alert called 01/29/2025 secondary to facial paralysis and limb ataxia, NIH 19; called at 2004  -POC glucose 117, 157/72  -01/29/2025 CT head without contrast showing no acute intracranial abnormality  -01/29/2025 CTA head and neck showing no large vessel occlusion  -MRI without contrast ordered for further evaluation  -01/30/2025 complete echo showing EF 60 to 65% with diastolic dysfunction  -Lipid panel, HbA1c ordered  -Permissive hypertension for 24 hours; patient's home antihypertensive regimen held  -PT/OT/SLP evaluation prior to discharge    S/p mechanical fall  Nondisplaced impacted left femoral neck fracture  -Patient denying head trauma or loss of consciousness  -01/28/2025 XR femur left showing no acute pathology  -01/28/2025 XR pelvis showing no acute pathology  -01/28/2025 CT pelvis without contrast showing nondisplaced impacted left femoral neck fracture  -Plan for left hip hemiarthroplasty 01/31/2025  -Judicious analgesia in the context of suspected obstructive sleep apnea; Patient documented to have received 150mcg fentanyl, morphine 2mg, tramadol 200mg

## 2025-01-30 NOTE — CONSULTS
Neurology Consult Note    Date:1/30/2025       Room:[address] [place] [address]-  Patient Name:Gabo Maldonado     YOB: 1940     Age:84 y.o.    Requesting Physician: Isaac Estrada MD     Reason for Consult:  Evaluate for       Chief Complaint:   Chief Complaint   Patient presents with    Fall    Hip Pain       Subjective     Gabo Maldonado is a 84 y.o. male with a history of HTN, adenomatous colon polyp, HTN, HLD, pituitary adenoma, former smoker, who is currently admitted to [facility] for treatment of a left femoral neck fracture following a mechanical fall from the second lowest stair two days ago. Patient was a code stroke last evening at 2004 for lethargy, inability to answer orientation questions appropriately, and reported right facial droop and tongue deviation to the right side. Last known well reportedly 1545. POC glucose 117. Initial /72. Initial NIH 19 per hospitalist: decreased LOC, one missed orientation question, not following commands, right facial droop, some effort against gravity BUE, no effort against gravity BLE, limb ataxia in two limbs, aphasia, dysarthria. Non-contrasted CTH negative for acute intracranial abnormalities. Patient not a candidate for thrombolytics due to last known well outside the window for administration. CTA head and neck negative for LVO. Patient not a candidate for endovascular intervention due to lack of LVO on CTA. MRI brain WO pending. Patients CO2 elevated at 99 last night, 81 this morning. Patient currently on BiPAP. BP hypotensive during my examination, 75/40. He did receive fentanyl 1/28 around 2330 and morphine 1 mg yesterday morning at 0430 and 0945. He was given narcan during the stroke alert yesterday with reported mild improvement. He does not appear to be on any antiplatelets or anticoagulation as an outpatient - will hold initiation at this time as ortho planning for L hip hemiarthroplasty tomorrow, if able. Patient remains drowsy on exam today, requiring  Loss Mother     High Blood Pressure Brother     Other Brother         bilateral knee repacement    Stroke Brother        Physical Examination      Vitals:  /68   Pulse 81   Temp 100.1 °F (37.8 °C) (Rectal)   Resp 12   Ht 1.727 m (5' 8\")   Wt 126.6 kg (279 lb)   SpO2 97%   BMI 42.42 kg/m²   Temp (24hrs), Av.4 °F (37.4 °C), Min:98.8 °F (37.1 °C), Max:100.1 °F (37.8 °C)      I/O (24Hr):    Intake/Output Summary (Last 24 hours) at 2025 0831  Last data filed at 2025 0517  Gross per 24 hour   Intake --   Output 200 ml   Net -200 ml         Physical Exam  Vitals (hypotensive) reviewed.   Constitutional:       Appearance: He is ill-appearing.      Comments: Drowsy, requiring repeat verbal and tactile stimulation for momentary arousal. Following some simple commands once aroused.    HENT:      Head: Normocephalic and atraumatic.      Right Ear: External ear normal.      Left Ear: External ear normal.      Nose: Nose normal.      Mouth/Throat:      Pharynx: Oropharynx is clear.   Eyes:      Pupils: Pupils are equal, round, and reactive to light.   Cardiovascular:      Rate and Rhythm: Normal rate.   Pulmonary:      Comments: Increased work of breathing, on BiPAP.   Abdominal:      Palpations: Abdomen is soft.      Tenderness: There is no abdominal tenderness.   Musculoskeletal:         General: Signs of injury (L hip) present.      Right lower leg: No edema.      Left lower leg: No edema.   Skin:     General: Skin is warm.      Findings: No rash.   Neurological:      Comments: Drowsy, requiring repeat verbal and tactile stimulation for momentary arousal. Following some simple commands once aroused. Oriented to name and location, does not answer additional orientation questions, but difficult to maintain arousal for prolonged period. No focal neurologic deficit noted.    Psychiatric:         Speech: Speech is slurred.      Comments: Unable to assess secondary to clinical condition.        Neurologic

## 2025-01-30 NOTE — PROGRESS NOTES
Patient admitted to  Room 05, transferred from 6.  Complaint upon arrival to the room none.  IV site free of s/s of infection or infiltration.  Vital signs obtained. Assessment and data collection initiated. Oriented to room. Policies and procedures for  explained All questions answered with no further questions at this time. Fall prevention and safety brochure discussed with patient. 2 person skin check completed.    Daughter, Cuca, notified of patient transfer upon calling for an update.     Was swallow screen completed on admission? [x] YES or [] NO  -Patient initially failed BSS on 6K, reassessed while patient was alert and patient denies any difficulty with swallowing. No coughing noticed by this nurse.

## 2025-01-30 NOTE — PROGRESS NOTES
Bedside report with morning Nurse ongoing, patient is lethargic and unable to answer orientation questions, generalized weakness noted. Vital signs checked and recorded. Resource Nurse called and Rapid Response activated at 1959. Blood sugar checked at 117mg/dl. Rapid Response team arrived. Narcan 0.4 mg was given at 2001. Sent for CAT scan at 2007.

## 2025-01-30 NOTE — CONSENT
Informed Consent for Blood Component Transfusion Note    I have discussed with the brother, son, and daughter the rationale for blood component transfusion; its benefits in treating or preventing fatigue, organ damage, or death; and its risk which includes mild transfusion reactions, rare risk of blood borne infection, or more serious but rare reactions. I have discussed the alternatives to transfusion, including the risk and consequences of not receiving transfusion. The brother, son, and daughter had an opportunity to ask questions and had agreed to proceed with transfusion of blood components.    Electronically signed by Dean Prieto MD on 1/30/25 at 5:06 PM EST

## 2025-01-30 NOTE — PROGRESS NOTES
[facility]  PHYSICAL THERAPY MISSED TREATMENT NOTE  [facility] ICU 4D    Date: 2025  Patient Name: Gabo Maldonado        MRN: [id number]   : 1940  (84 y.o.)  Gender: male                REASON FOR MISSED TREATMENT:  Missed Treat.      Update from earlier am- hold treatment per doctor. Pt no longer a candidate for L hip hemiarthroplasty this date d/t increased blood gas levels and code stoke called yesterday due to weakness, imaging negative for acute process, improvement with narcan.  Doctor reports considering intubation this date and sx tomorrow. Will check back next available date as appropriate.

## 2025-01-30 NOTE — PROCEDURES
PROCEDURE NOTE  Date: 1/30/2025   Name: Gabo Maldonado  YOB: 1940    Intubation    Date/Time: 1/30/2025 5:37 PM    Performed by: Astrid Puente MD  Authorized by: Astrid Puente MD  Consent: The procedure was performed in an emergent situation.  Patient identity confirmed: hospital-assigned identification number and arm band  Time out: Immediately prior to procedure a \"time out\" was called to verify the correct patient, procedure, equipment, support staff and site/side marked as required.  Indications: hypercapnia and respiratory failure  Intubation method: video-assisted  Patient status: sedated  Preoxygenation: Bipap.  Pretreatment medications: fentanyl and midazolam  Sedatives: etomidate  Paralytic: none  Laryngoscope size: Mac 3  Tube size: 7.5 mm  Tube type: cuffed  Number of attempts: 2  Ventilation between attempts: BVM  Cricoid pressure: no  Cords visualized: yes  Post-procedure assessment: ETCO2 monitor  Breath sounds: equal  ETT to lip: 26 cm  Tube secured with: ETT young  Chest x-ray interpreted by me.  Chest x-ray findings: endotracheal tube too low  Tube repositioned: tube repositioned successfully  Patient tolerance: patient tolerated the procedure well with no immediate complications  Comments: Difficult airway, with large tongue, anterior airway. First attempt by my colleague unable to advance to ETT to the airway, no color change. Aborted and bagged the patient. Pulse ox was >94% at all times. Second attempt by myself, initial mucos covering the camera, wiped off, then good visualization of the cords, but difficulty inserting the ETT due to large tongue and floppy airway tissue. Tube was place correctly with visualization of going through the cords.     Dr Urbina was present to supervise and assist.

## 2025-01-30 NOTE — PROCEDURES
PROCEDURE NOTE  Date: 1/30/2025   Name: Gabo Maldonado  YOB: 1940    CENTRAL LINE    Date/Time: 1/30/2025 4:34 PM    Performed by: Rosi Box APRN - CNP  Authorized by: Rosi Box APRN - CNP  Consent: Verbal consent obtained. Written consent obtained.  Risks and benefits: risks, benefits and alternatives were discussed  Consent given by: children.  Patient understanding: patient states understanding of the procedure being performed  Patient consent: the patient's understanding of the procedure matches consent given  Relevant documents: relevant documents present and verified  Test results: test results available and properly labeled  Site marked: the operative site was marked  Imaging studies: imaging studies available  Patient identity confirmed: arm band  Time out: Immediately prior to procedure a \"time out\" was called to verify the correct patient, procedure, equipment, support staff and site/side marked as required.  Indications: vascular access and central pressure monitoring  Anesthesia: local infiltration    Anesthesia:  Local Anesthetic: lidocaine 1% without epinephrine  Anesthetic total: 5 mL    Sedation:  Patient sedated: yes  Analgesia: see MAR for details  Vitals: Vital signs were monitored during sedation.    Preparation: skin prepped with 2% chlorhexidine  Skin prep agent dried: skin prep agent completely dried prior to procedure  Hand hygiene: hand hygiene performed prior to central venous catheter insertion  Location details: right subclavian  Patient position: reverse Trendelenburg  Catheter type: triple lumen  Catheter size: 6 Fr  Pre-procedure: landmarks identified  Ultrasound guidance: no  Number of attempts: 1  Successful placement: yes  Post-procedure: line sutured and dressing applied  Assessment: blood return through all ports, free fluid flow, placement verified by x-ray and no pneumothorax on x-ray  Patient tolerance: patient tolerated the procedure well with no

## 2025-01-30 NOTE — PROGRESS NOTES
TRAUMA SBIRT attempt. Patient currently sedated at this time. [place] SW to attempt at a later time

## 2025-01-30 NOTE — PROGRESS NOTES
Orthopaedic Progress Note      SUBJECTIVE     Mr. Maldonado is hospital day 2, L femoral neck fracture    Seen at bedside this morning  Code jalil called yesterday due to weakness, imaging negative for acute process, improvement with narcan   NPO  Anticipate AM ABG  Narcotics administered yesterday      OBJECTIVE      Physical    VITALS:  /68   Pulse 77   Temp 100.1 °F (37.8 °C) (Rectal)   Resp 16   Ht 1.727 m (5' 8\")   Wt 126.6 kg (279 lb)   SpO2 97%   BMI 42.42 kg/m²   No intake/output data recorded.      NC  Alerts to sternal rub but minimally able to continue in conversation appropriately, does orient  appropriately when alert   LLE:- atraumatic hip, knee, ankle, no lacerations or lesions. Sitting in neutral alignment. Compartments soft.  Nontender to palpation asis iliac crest, TTP groin and greater troch, nontender femoral shaft medial lateral joint line, tibia shaft, medial lateral mal, midfoot, calc, calf supple nontender,  Able to perform isometric quad fire, gastroc ta ehl intact, pain with IR ER through hip. sensation to light touch intact, distal pulses palpable       Data  CBC:   Lab Results   Component Value Date/Time    WBC 6.8 01/28/2025 10:35 PM    HGB 13.7 01/28/2025 10:35 PM     01/28/2025 10:35 PM     BMP:    Lab Results   Component Value Date/Time     01/29/2025 09:19 PM    K 4.7 01/29/2025 09:19 PM    K 5.3 10/19/2021 08:48 AM    CL 95 01/29/2025 09:19 PM    CO2 29 01/29/2025 09:19 PM    BUN 17 01/29/2025 09:19 PM    CREATININE 0.9 01/29/2025 09:19 PM    CALCIUM 9.0 01/29/2025 09:19 PM    GLUCOSE 125 01/29/2025 09:19 PM     Uric Acid:  No components found for: \"URIC\"  PT/INR:    Lab Results   Component Value Date/Time    INR 1.06 01/29/2025 06:43 AM     Troponin:  No results found for: \"TROPONINI\"  Urine Culture:  No components found for: \"CURINE\"      Current Inpatient Medications    Current Facility-Administered Medications: albuterol sulfate HFA

## 2025-01-30 NOTE — SIGNIFICANT EVENT
CODE STROKE   Activation By: Rapid Response team    NIHSS, POC glucose, rapid physical and neurologic exam, vitals including weight obtained before transfer to CT.     The CODE Stroke Team met in the patient room.    Code Stroke called at 2004    Last Known Well: 1545  Initial NIHSS: 19 (from Screenings)  NIH Stroke Scale  NIH Stroke Scale Assessed: Yes  Interval: Baseline  Level of Consciousness (1a): Not alert, but arousable by minor stimulation to obey  LOC Questions (1b): Answers one correctly  LOC Commands (1c): Performs neither task correctly  Best Gaze (2): Normal  Visual (3): No visual loss  Facial Palsy (4): (!) Minor paralysis  Motor Arm, Left (5a): Some effort against gravity  Motor Arm, Right (5b): Some effort against gravity  Motor Leg, Left (6a): No effort against gravity, limb falls  Motor Leg, Right (6b): No effort against gravity, limb falls  Limb Ataxia (7): (!) Present in two limbs  Sensory (8): Normal  Best Language (9): Mild to moderate aphasia  Dysarthria (10): Mild to moderate, slurs some words  Extinction and Inattention (11): No abnormality  Total: 19    POC Glucose: 117 mg/dl    CT Head Without Contrast: No acute intracranial hemorrhage    ECG, IV inserted, and blood obtained between CT non-contrast and CTA.    ECG: NSR with PVC's    CTA Head and Neck: No large vessel occlusion.    Communicated with Neurointerventional Team @ 2021  Endovascular Intervention: Does not meet inclusion criteria.    Initial Assessment & Plan:  Rapid response called.  On assessment, patient is lethargic.  Unable to answer questions appropriately.  Notable right facial droop and tongue deviation to the right. Received pain medications throughout the day.  Narcan was given at the time the rapid was called with mild improvement of mentation.  The patient was taken immediatly to CT scan. I did contact Dr. Conroy who reviewed imaging.  The patient is out of the window for TNK.  No LVO appreciated.  Will complete  metabolic workup. Transfer patient to stepdown for closer monitoring.     References:  2019 AHA/ASA Guideline on Management of Acute Ischemic Stroke Update  2018 AHA/ASA Guidelines for Management of Acute Ischemic Stroke  2018 ACEP Clinical Policy: Use of Acute TPA for the Management of Acute Ischemic Stroke in the Emergency Department     Stroke: Thrombolytic Therapy (MIPS #187)  Symptom Onset: <4.5 hours  Thrombolytic Contraindications:  The patient was diagnosed with subacute or acute ischemic stroke. Patient is NOT a TNK/TPA Candidate due to [select]:  [x]>4.5 hours after last known well time, OR the last known well time is unknown   []Active internal bleeding, Aortic Dissection, or presenting with signs or symptoms of Subarachnoid Hemorrhage  []Neuro/spine surgery, Serious head trauma or Stroke within previous 3 months  []History of intracranial hemorrhage (or current intracranial blood on imaging)  []Intracranial neoplasm, AVM, or aneurysm  []Uncontrollable hypertension (>185/110mm Hg) despite aggressive HTN treatment  []GI Malignancy OR GI Bleeding event within 21 days  []Suspected/Confirmed Endocarditis  []Known Bleeding disorders: Platelets <100,000, INR >1.7, use of Direct Thrombin/Factor Xa Inhibitors within 48hrs. LMWH within 24hrs. Do not await lab results unless suspect coagulopathy  []Blood Glucose <50mg/dl (however should treat if stroke symptoms persist after glucose normalized)  []Early radiographic ischemic changes on head CT or extensive area of hypoattenuation  []REFUSAL: Patient or family declined IV TNK    Relative Contraindications:  []Pregnancy (may be safe)  []Minor or rapidly improving stroke symptoms  []Seizure at onset of stroke (may be reasonable if concern for ischemic stroke rather than Fabian's paralysis)  []Mild stroke with non-disabling symptoms  []Major surgery or Trauma within 14 days  []Lumbar puncture within 7 days (may be safe)  []Arterial puncture at non-compressible site

## 2025-01-30 NOTE — PROGRESS NOTES
[facility]  OCCUPATIONAL THERAPY MISSED TREATMENT NOTE  [facility] NEUROSCIENCES 4A  4A-05/005-A      Date: 2025  Patient Name: Gabo Maldonado        CSN: [id number]   : 1940  (84 y.o.)  Gender: male                REASON FOR MISSED TREATMENT:  hold treatment per doctor. Pt no longer a candidate for L hip hemiarthroplasty this date d/t increased blood gas levels and code stroke. Doctor reports considering intubation this date and sx tomorrow. Will check back next available date as appropriate.

## 2025-01-30 NOTE — PROGRESS NOTES
Formerly named [facility]  SPEECH THERAPY MISSED TREATMENT NOTE  [facility] NEUROSCIENCES 4A      Date: 2025  Patient Name: Gabo Maldonado        MRN: [id number]    : 1940  (84 y.o.)    REASON FOR MISSED TREATMENT:  ST received novel orders from Joseline Hernández PA-C to complete clinical swallow evaluation and delfxv-xaytrded-[id number] evaluation. Per discussion with DENISE Mercado, patient currently on BiPAP and is not appropriate for skilled ST services. ST to re-attempt at a later date/time as patient is medically appropriate and available.     Abbie Mendoza M.A., CCC-SLP [id number]

## 2025-01-30 NOTE — PROCEDURES
PROCEDURE NOTE  Date: 1/30/2025   Name: Gabo Maldonado  YOB: 1940    Procedures  EKG completed, given to Shanelle WALKER

## 2025-01-31 ENCOUNTER — APPOINTMENT (OUTPATIENT)
Dept: GENERAL RADIOLOGY | Age: 85
DRG: 853 | End: 2025-01-31
Payer: MEDICARE

## 2025-01-31 LAB
ANION GAP SERPL CALC-SCNC: 11 MEQ/L (ref 8–16)
BASE EXCESS BLDA CALC-SCNC: 3.5 MMOL/L (ref -2–3)
BUN SERPL-MCNC: 37 MG/DL (ref 7–22)
CA-I BLD ISE-SCNC: 1.33 MMOL/L (ref 1.12–1.32)
CALCIUM SERPL-MCNC: 8.9 MG/DL (ref 8.5–10.5)
CHLORIDE SERPL-SCNC: 101 MEQ/L (ref 98–111)
CO2 SERPL-SCNC: 26 MEQ/L (ref 23–33)
COLLECTED BY:: ABNORMAL
CREAT SERPL-MCNC: 1.7 MG/DL (ref 0.4–1.2)
DEPRECATED RDW RBC AUTO: 49.1 FL (ref 35–45)
DEVICE: ABNORMAL
EKG Q-T INTERVAL: 338 MS
EKG Q-T INTERVAL: 364 MS
EKG QRS DURATION: 102 MS
EKG QRS DURATION: 98 MS
EKG QTC CALCULATION (BAZETT): 392 MS
EKG QTC CALCULATION (BAZETT): 404 MS
EKG R AXIS: -11 DEGREES
EKG R AXIS: -26 DEGREES
EKG T AXIS: -11 DEGREES
EKG T AXIS: 47 DEGREES
EKG VENTRICULAR RATE: 74 BPM
EKG VENTRICULAR RATE: 81 BPM
ERYTHROCYTE [DISTWIDTH] IN BLOOD BY AUTOMATED COUNT: 13.7 % (ref 11.5–14.5)
FIO2 ON VENT O2 ANALYZER: 60 %
GFR SERPL CREATININE-BSD FRML MDRD: 39 ML/MIN/1.73M2
GLUCOSE BLD STRIP.AUTO-MCNC: 142 MG/DL (ref 70–108)
GLUCOSE SERPL-MCNC: 147 MG/DL (ref 70–108)
HCO3 BLDA-SCNC: 31 MMOL/L (ref 23–28)
HCT VFR BLD AUTO: 38.4 % (ref 42–52)
HCT VFR BLD AUTO: 40.6 % (ref 42–52)
HCT VFR BLD AUTO: 41.5 % (ref 42–52)
HCT VFR BLD AUTO: 41.7 % (ref 42–52)
HGB BLD-MCNC: 12.9 GM/DL (ref 14–18)
HGB BLD-MCNC: 13.5 GM/DL (ref 14–18)
HGB BLD-MCNC: 13.7 GM/DL (ref 14–18)
HGB BLD-MCNC: 13.8 GM/DL (ref 14–18)
INR PPP: 1.21 (ref 0.85–1.13)
MAGNESIUM SERPL-MCNC: 2.1 MG/DL (ref 1.6–2.4)
MCH RBC QN AUTO: 32 PG (ref 26–33)
MCHC RBC AUTO-ENTMCNC: 33.1 GM/DL (ref 32.2–35.5)
MCV RBC AUTO: 96.8 FL (ref 80–94)
PCO2 TEMP ADJ BLDMV: 57 MMHG (ref 41–51)
PH BLDMV: 7.34 [PH] (ref 7.31–7.41)
PIP: 24 CMH2O
PLATELET # BLD AUTO: 145 THOU/MM3 (ref 130–400)
PMV BLD AUTO: 10.1 FL (ref 9.4–12.4)
PO2 BLDMV: 39 MMHG (ref 25–40)
POTASSIUM SERPL-SCNC: 4.9 MEQ/L (ref 3.5–5.2)
RBC # BLD AUTO: 4.31 MILL/MM3 (ref 4.7–6.1)
SAO2 % BLDMV: 69 %
SET PEEP: 8 MMHG
SET RESPIRATORY RATE: 12 BPM
SITE: ABNORMAL
SODIUM SERPL-SCNC: 138 MEQ/L (ref 135–145)
TROPONIN, HIGH SENSITIVITY: 104 NG/L (ref 0–12)
TROPONIN, HIGH SENSITIVITY: 108 NG/L (ref 0–12)
VENTILATION MODE VENT: ABNORMAL
WBC # BLD AUTO: 12.7 THOU/MM3 (ref 4.8–10.8)

## 2025-01-31 PROCEDURE — 2580000003 HC RX 258: Performed by: NURSE ANESTHETIST, CERTIFIED REGISTERED

## 2025-01-31 PROCEDURE — 2500000003 HC RX 250 WO HCPCS: Performed by: PHYSICIAN ASSISTANT

## 2025-01-31 PROCEDURE — 93005 ELECTROCARDIOGRAM TRACING: CPT

## 2025-01-31 PROCEDURE — 6360000002 HC RX W HCPCS: Performed by: PHYSICIAN ASSISTANT

## 2025-01-31 PROCEDURE — 82803 BLOOD GASES ANY COMBINATION: CPT

## 2025-01-31 PROCEDURE — 82330 ASSAY OF CALCIUM: CPT

## 2025-01-31 PROCEDURE — 2000000000 HC ICU R&B

## 2025-01-31 PROCEDURE — 2709999900 HC NON-CHARGEABLE SUPPLY: Performed by: ORTHOPAEDIC SURGERY

## 2025-01-31 PROCEDURE — 83735 ASSAY OF MAGNESIUM: CPT

## 2025-01-31 PROCEDURE — 6370000000 HC RX 637 (ALT 250 FOR IP)

## 2025-01-31 PROCEDURE — 6360000002 HC RX W HCPCS

## 2025-01-31 PROCEDURE — 80048 BASIC METABOLIC PNL TOTAL CA: CPT

## 2025-01-31 PROCEDURE — 3600000014 HC SURGERY LEVEL 4 ADDTL 15MIN: Performed by: ORTHOPAEDIC SURGERY

## 2025-01-31 PROCEDURE — 6360000002 HC RX W HCPCS: Performed by: ORTHOPAEDIC SURGERY

## 2025-01-31 PROCEDURE — 2500000003 HC RX 250 WO HCPCS

## 2025-01-31 PROCEDURE — 3600000004 HC SURGERY LEVEL 4 BASE: Performed by: ORTHOPAEDIC SURGERY

## 2025-01-31 PROCEDURE — 3700000001 HC ADD 15 MINUTES (ANESTHESIA): Performed by: ORTHOPAEDIC SURGERY

## 2025-01-31 PROCEDURE — 36415 COLL VENOUS BLD VENIPUNCTURE: CPT

## 2025-01-31 PROCEDURE — 94761 N-INVAS EAR/PLS OXIMETRY MLT: CPT

## 2025-01-31 PROCEDURE — 85018 HEMOGLOBIN: CPT

## 2025-01-31 PROCEDURE — 85610 PROTHROMBIN TIME: CPT

## 2025-01-31 PROCEDURE — 51701 INSERT BLADDER CATHETER: CPT

## 2025-01-31 PROCEDURE — 2700000000 HC OXYGEN THERAPY PER DAY

## 2025-01-31 PROCEDURE — 2720000010 HC SURG SUPPLY STERILE: Performed by: ORTHOPAEDIC SURGERY

## 2025-01-31 PROCEDURE — 2500000003 HC RX 250 WO HCPCS: Performed by: NURSE ANESTHETIST, CERTIFIED REGISTERED

## 2025-01-31 PROCEDURE — 6370000000 HC RX 637 (ALT 250 FOR IP): Performed by: PHYSICIAN ASSISTANT

## 2025-01-31 PROCEDURE — 2580000003 HC RX 258: Performed by: PHYSICIAN ASSISTANT

## 2025-01-31 PROCEDURE — C1776 JOINT DEVICE (IMPLANTABLE): HCPCS | Performed by: ORTHOPAEDIC SURGERY

## 2025-01-31 PROCEDURE — 3700000000 HC ANESTHESIA ATTENDED CARE: Performed by: ORTHOPAEDIC SURGERY

## 2025-01-31 PROCEDURE — 85027 COMPLETE CBC AUTOMATED: CPT

## 2025-01-31 PROCEDURE — 84484 ASSAY OF TROPONIN QUANT: CPT

## 2025-01-31 PROCEDURE — 2580000003 HC RX 258: Performed by: ORTHOPAEDIC SURGERY

## 2025-01-31 PROCEDURE — 2500000003 HC RX 250 WO HCPCS: Performed by: STUDENT IN AN ORGANIZED HEALTH CARE EDUCATION/TRAINING PROGRAM

## 2025-01-31 PROCEDURE — 94003 VENT MGMT INPAT SUBQ DAY: CPT

## 2025-01-31 PROCEDURE — 99291 CRITICAL CARE FIRST HOUR: CPT | Performed by: INTERNAL MEDICINE

## 2025-01-31 PROCEDURE — 2500000003 HC RX 250 WO HCPCS: Performed by: ORTHOPAEDIC SURGERY

## 2025-01-31 PROCEDURE — 82948 REAGENT STRIP/BLOOD GLUCOSE: CPT

## 2025-01-31 PROCEDURE — 85014 HEMATOCRIT: CPT

## 2025-01-31 PROCEDURE — 94640 AIRWAY INHALATION TREATMENT: CPT

## 2025-01-31 PROCEDURE — 71045 X-RAY EXAM CHEST 1 VIEW: CPT

## 2025-01-31 PROCEDURE — 6360000002 HC RX W HCPCS: Performed by: NURSE ANESTHETIST, CERTIFIED REGISTERED

## 2025-01-31 DEVICE — TITANIUM MODULAR HEAD SLEEVE 12/14                                    TAPER +4: Type: IMPLANTABLE DEVICE | Site: HIP | Status: FUNCTIONAL

## 2025-01-31 DEVICE — SYNERGY CEMENTED FEMORAL COMPONENT                                    SIZE 13
Type: IMPLANTABLE DEVICE | Site: HIP | Status: FUNCTIONAL
Brand: SYNERGY

## 2025-01-31 DEVICE — R3 3 HOLE ACETABULAR SHELL 56MM
Type: IMPLANTABLE DEVICE | Site: HIP | Status: FUNCTIONAL
Brand: R3 ACETABULAR

## 2025-01-31 DEVICE — R3 0 DEGREE XLPE ACETABULAR LINER                                    40MM INNER DIAMETER X 56MM OUTER DIAMETER
Type: IMPLANTABLE DEVICE | Site: HIP | Status: FUNCTIONAL
Brand: R3

## 2025-01-31 DEVICE — HIP H1 TOT STD COCR HD IMPL CAPPED H1 SN: Type: IMPLANTABLE DEVICE | Site: HIP | Status: FUNCTIONAL

## 2025-01-31 DEVICE — 40MM COCR MODULAR FEMORAL HEAD: Type: IMPLANTABLE DEVICE | Site: HIP | Status: FUNCTIONAL

## 2025-01-31 DEVICE — REFLECTION SPHERICAL HEAD SCREW 45MM
Type: IMPLANTABLE DEVICE | Site: HIP | Status: FUNCTIONAL
Brand: REFLECTION

## 2025-01-31 RX ORDER — TRANEXAMIC ACID 100 MG/ML
INJECTION, SOLUTION INTRAVENOUS PRN
Status: DISCONTINUED | OUTPATIENT
Start: 2025-01-31 | End: 2025-01-31 | Stop reason: ALTCHOICE

## 2025-01-31 RX ORDER — ROCURONIUM BROMIDE 10 MG/ML
INJECTION, SOLUTION INTRAVENOUS
Status: DISCONTINUED | OUTPATIENT
Start: 2025-01-31 | End: 2025-01-31 | Stop reason: SDUPTHER

## 2025-01-31 RX ORDER — VASOPRESSIN 20 [USP'U]/ML
INJECTION, SOLUTION INTRAVENOUS
Status: DISCONTINUED | OUTPATIENT
Start: 2025-01-31 | End: 2025-01-31 | Stop reason: SDUPTHER

## 2025-01-31 RX ORDER — SODIUM CHLORIDE 9 MG/ML
INJECTION, SOLUTION INTRAVENOUS
Status: DISCONTINUED | OUTPATIENT
Start: 2025-01-31 | End: 2025-01-31 | Stop reason: SDUPTHER

## 2025-01-31 RX ORDER — TRANEXAMIC ACID 100 MG/ML
INJECTION, SOLUTION INTRAVENOUS
Status: DISCONTINUED | OUTPATIENT
Start: 2025-01-31 | End: 2025-01-31 | Stop reason: SDUPTHER

## 2025-01-31 RX ORDER — PROPOFOL 10 MG/ML
5-50 INJECTION, EMULSION INTRAVENOUS CONTINUOUS
Status: DISCONTINUED | OUTPATIENT
Start: 2025-01-31 | End: 2025-01-31

## 2025-01-31 RX ORDER — VANCOMYCIN HYDROCHLORIDE 1 G/20ML
INJECTION, POWDER, LYOPHILIZED, FOR SOLUTION INTRAVENOUS PRN
Status: DISCONTINUED | OUTPATIENT
Start: 2025-01-31 | End: 2025-01-31 | Stop reason: ALTCHOICE

## 2025-01-31 RX ORDER — MORPHINE SULFATE 2 MG/ML
1 INJECTION, SOLUTION INTRAMUSCULAR; INTRAVENOUS EVERY 4 HOURS PRN
Status: DISPENSED | OUTPATIENT
Start: 2025-01-31

## 2025-01-31 RX ORDER — OXYCODONE HYDROCHLORIDE 5 MG/1
2.5 TABLET ORAL EVERY 6 HOURS PRN
Status: DISPENSED | OUTPATIENT
Start: 2025-01-31

## 2025-01-31 RX ORDER — PHENYLEPHRINE HCL IN 0.9% NACL 1 MG/10 ML
SYRINGE (ML) INTRAVENOUS
Status: DISCONTINUED | OUTPATIENT
Start: 2025-01-31 | End: 2025-01-31 | Stop reason: SDUPTHER

## 2025-01-31 RX ORDER — ACETAMINOPHEN 500 MG
1000 TABLET ORAL EVERY 8 HOURS
Status: DISCONTINUED | OUTPATIENT
Start: 2025-01-31 | End: 2025-02-02

## 2025-01-31 RX ADMIN — SODIUM CHLORIDE, PRESERVATIVE FREE 30 ML: 5 INJECTION INTRAVENOUS at 20:46

## 2025-01-31 RX ADMIN — AZITHROMYCIN MONOHYDRATE 500 MG: 500 INJECTION, POWDER, LYOPHILIZED, FOR SOLUTION INTRAVENOUS at 15:25

## 2025-01-31 RX ADMIN — Medication 0.8 MCG/KG/HR: at 00:28

## 2025-01-31 RX ADMIN — Medication 6 MCG/MIN: at 03:13

## 2025-01-31 RX ADMIN — CHLORHEXIDINE GLUCONATE 15 ML: 1.2 RINSE ORAL at 07:42

## 2025-01-31 RX ADMIN — ROCURONIUM BROMIDE 50 MG: 10 INJECTION, SOLUTION INTRAVENOUS at 08:13

## 2025-01-31 RX ADMIN — Medication 0.8 MCG/KG/HR: at 05:16

## 2025-01-31 RX ADMIN — Medication 3000 MG: at 08:27

## 2025-01-31 RX ADMIN — HYDROCORTISONE SODIUM SUCCINATE 100 MG: 100 INJECTION, POWDER, FOR SOLUTION INTRAMUSCULAR; INTRAVENOUS at 07:42

## 2025-01-31 RX ADMIN — ACETAMINOPHEN 1000 MG: 500 TABLET ORAL at 22:14

## 2025-01-31 RX ADMIN — VASOPRESSIN 4 UNITS: 20 INJECTION, SOLUTION INTRAVENOUS at 09:21

## 2025-01-31 RX ADMIN — PANTOPRAZOLE SODIUM 40 MG: 40 INJECTION, POWDER, FOR SOLUTION INTRAVENOUS at 07:42

## 2025-01-31 RX ADMIN — WATER 2000 MG: 1 INJECTION INTRAMUSCULAR; INTRAVENOUS; SUBCUTANEOUS at 15:36

## 2025-01-31 RX ADMIN — VASOPRESSIN 2 UNITS: 20 INJECTION, SOLUTION INTRAVENOUS at 09:17

## 2025-01-31 RX ADMIN — ACETAMINOPHEN 1000 MG: 500 TABLET ORAL at 14:36

## 2025-01-31 RX ADMIN — HYDROCORTISONE SODIUM SUCCINATE 100 MG: 100 INJECTION, POWDER, FOR SOLUTION INTRAMUSCULAR; INTRAVENOUS at 14:36

## 2025-01-31 RX ADMIN — Medication 200 MCG: at 08:19

## 2025-01-31 RX ADMIN — SODIUM CHLORIDE, PRESERVATIVE FREE 10 ML: 5 INJECTION INTRAVENOUS at 07:42

## 2025-01-31 RX ADMIN — BUDESONIDE AND FORMOTEROL FUMARATE DIHYDRATE 2 PUFF: 160; 4.5 AEROSOL RESPIRATORY (INHALATION) at 10:34

## 2025-01-31 RX ADMIN — Medication 50 MCG/HR: at 00:29

## 2025-01-31 RX ADMIN — SODIUM CHLORIDE: 9 INJECTION, SOLUTION INTRAVENOUS at 08:12

## 2025-01-31 RX ADMIN — OXYCODONE HYDROCHLORIDE 2.5 MG: 5 TABLET ORAL at 15:52

## 2025-01-31 RX ADMIN — ROCURONIUM BROMIDE 30 MG: 10 INJECTION, SOLUTION INTRAVENOUS at 09:09

## 2025-01-31 RX ADMIN — MORPHINE SULFATE 1 MG: 2 INJECTION, SOLUTION INTRAMUSCULAR; INTRAVENOUS at 18:00

## 2025-01-31 RX ADMIN — TRANEXAMIC ACID 1000 MG: 100 INJECTION, SOLUTION INTRAVENOUS at 08:42

## 2025-01-31 RX ADMIN — CHLORHEXIDINE GLUCONATE 15 ML: 1.2 RINSE ORAL at 20:45

## 2025-01-31 ASSESSMENT — PULMONARY FUNCTION TESTS
PIF_VALUE: 22
PIF_VALUE: 24
PIF_VALUE: 15
PIF_VALUE: 19

## 2025-01-31 ASSESSMENT — PAIN - FUNCTIONAL ASSESSMENT
PAIN_FUNCTIONAL_ASSESSMENT: PREVENTS OR INTERFERES WITH ALL ACTIVE AND SOME PASSIVE ACTIVITIES
PAIN_FUNCTIONAL_ASSESSMENT: PREVENTS OR INTERFERES WITH ALL ACTIVE AND SOME PASSIVE ACTIVITIES

## 2025-01-31 ASSESSMENT — PAIN DESCRIPTION - ORIENTATION
ORIENTATION: LEFT
ORIENTATION: LEFT

## 2025-01-31 ASSESSMENT — PAIN DESCRIPTION - LOCATION
LOCATION: HIP
LOCATION: HIP

## 2025-01-31 ASSESSMENT — PAIN DESCRIPTION - ONSET
ONSET: ON-GOING
ONSET: GRADUAL

## 2025-01-31 ASSESSMENT — PAIN SCALES - GENERAL
PAINLEVEL_OUTOF10: 8
PAINLEVEL_OUTOF10: 7
PAINLEVEL_OUTOF10: 10
PAINLEVEL_OUTOF10: 0

## 2025-01-31 ASSESSMENT — PAIN DESCRIPTION - PAIN TYPE
TYPE: ACUTE PAIN;SURGICAL PAIN
TYPE: ACUTE PAIN;SURGICAL PAIN

## 2025-01-31 ASSESSMENT — PAIN DESCRIPTION - DESCRIPTORS
DESCRIPTORS: ACHING;SORE
DESCRIPTORS: ACHING;DISCOMFORT;SORE

## 2025-01-31 ASSESSMENT — PAIN DESCRIPTION - FREQUENCY: FREQUENCY: CONTINUOUS

## 2025-01-31 NOTE — OP NOTE
Operative Note      Patient: Gabo Maldonado  YOB: 1940  MRN: [id number]    Date of Procedure: 1/31/2025    Preop diagnosis: Left closed displaced comminuted femoral neck fracture    Post-Op Diagnosis: Same       Procedure(s):  LEFT HIP TOTAL ARTHROPLASTY 42725    Surgeon(s):  Isaac Estrada MD    Assistant:   Surgical Assistant: Chapo Mcbride  Physician Assistant: Curtis Loza PA-C    The physician assistant was present for the entirety of the procedure and vital for the performance of the procedure.  He assisted with positioning, prepping, draping of the patient before the procedure and instrument manipulation, extremity repositioning  as well as wound closure, dressing application after the procedure. Please note that no intern, resident, or other hospital staff was available to assist during the surgery    Anesthesia: General    Estimated Blood Loss (mL): 200     Complications: None    Specimens:   * No specimens in log *    Implants:  Implant Name Type Inv. Item Serial No.  Lot No. LRB No. Used Action   SHELL ACET [id number] 3 H STD HIP POLY POR PRESSFIT R3 - [id number]  SHELL ACET [id number] 3 H STD HIP POLY POR PRESSFIT R3  [employer] [facility] [id number] Left 1 Implanted   SCREW BNE L45MM DIA6.5MM CANC HIP SPHR HD FOR ACET SYS - [id number]  SCREW BNE L45MM DIA6.5MM CANC HIP SPHR HD FOR ACET SYS  [employer] [facility] [id number] Left 1 Implanted   LINER ACET OD56MM ID40MM [id number] 0DEG LOAD BEAR AND TAPR - [id number]  LINER ACET OD56MM ID40MM [id number] 0DEG LOAD BEAR AND TAPR  [employer] [facility] [id number] Left 1 Implanted   STEM FEM SZ 13 L130MM STD OFFSET CO CHROM FORGED ELYSE - [id number]  STEM FEM SZ 13 L130MM STD OFFSET CO CHROM FORGED ELYSE  [employer] [facility] [id number] Left 1 Implanted   HEAD FEM [id number] HIP CO CHROME [employer] MOD ANTHOLOGY - [id number]  HEAD FEM [id number] HIP CO [employer]Y  [employer] NEPH  by Isaac Estrada MD on 1/31/2025 at 9:20 AM

## 2025-01-31 NOTE — ANESTHESIA PRE PROCEDURE
Department of Anesthesiology  Preprocedure Note       Name:  Gabo Maldonado   Age:  84 y.o.  :  1940                                          MRN:  [id number]         Date:  2025      Surgeon: Surgeon(s):  Isaac Estrada MD    Procedure: Procedure(s):  LEFT HIP HEMIARTHROPLASTY    Medications prior to admission:   Prior to Admission medications    Medication Sig Start Date End Date Taking? Authorizing Provider   lisinopril (PRINIVIL;ZESTRIL) 20 MG tablet TAKE 1 TABLET TWICE A DAY 25  Yes Pedro Escobedo DO   metoprolol tartrate (LOPRESSOR) 50 MG tablet TAKE 1 TABLET TWICE A DAY 25  Yes Pedro Escobedo DO   ascorbic acid (VITAMIN C) 500 MG tablet Take 1 tablet by mouth daily 10/20/21  Yes Riley Daniels DO   Cholecalciferol (VITAMIN D-3 PO) Take by mouth   Yes Devin Nuñez MD   ZINC PO Take by mouth   Yes Devin Nuñez MD   Misc Natural Products (GLUCOSAMINE CHONDROITIN MSM PO) Take by mouth   Yes Devin Nuñez MD   docusate sodium (COLACE) 100 MG capsule Take 1 capsule by mouth every evening   Yes Devin Nuñez MD   multivitamin (THERAGRAN) per tablet Take 1 tablet by mouth daily   Yes Devin Nuñez MD   Lysine 500 MG CAPS Take 500 mg by mouth. 2 daily      Yes Devin Nuñez MD   Arginine 500 MG CAPS Take 1,000 mg by mouth daily    Yes Devin Nuñez MD   B Complex Vitamins (VITAMIN B COMPLEX PO) Take  by mouth. Twice weekly    Yes Devin Nuñez MD   albuterol sulfate  (90 Base) MCG/ACT inhaler Inhale 2 puffs into the lungs every 6 hours as needed for Wheezing or Shortness of Breath 10/19/21   Riley Daniels DO       Current medications:    Current Facility-Administered Medications   Medication Dose Route Frequency Provider Last Rate Last Admin    propofol infusion  5-50 mcg/kg/min IntraVENous Continuous Andre Hassan MD        azithromycin (ZITHROMAX) 500 mg in sodium chloride 0.9 % 250 mL IVPB  Andre CHOWDHURY MD        dexmedeTOMIDine (PRECEDEX) 400 mcg in sodium chloride 0.9 % 100 mL infusion  0.1-1.5 mcg/kg/hr IntraVENous Continuous Edinson Flower MD 19 mL/hr at 01/31/25 0741 0.6 mcg/kg/hr at 01/31/25 0741    fentaNYL (SUBLIMAZE) 1,000 mcg in sodium chloride 0.9% 100 mL infusion   mcg/hr IntraVENous Continuous Hanane Granados DO   Stopped at 01/31/25 0502    albuterol sulfate HFA (PROVENTIL;VENTOLIN;PROAIR) 108 (90 Base) MCG/ACT inhaler 2 puff  2 puff Inhalation Q6H PRN Joseline Hernández PA-C        [Held by provider] ascorbic acid (VITAMIN C) tablet 500 mg  500 mg Oral Daily Joseline Hernández PA-C   500 mg at 01/29/25 1259    [Held by provider] lisinopril (PRINIVIL;ZESTRIL) tablet 20 mg  20 mg Oral BID Joseline Hernández PA-C   20 mg at 01/29/25 1257    [Held by provider] Vitamin D (CHOLECALCIFEROL) tablet 1,000 Units  1,000 Units Oral Daily Joseline Hernández PA-C   1,000 Units at 01/29/25 1257    [Held by provider] metoprolol tartrate (LOPRESSOR) tablet 50 mg  50 mg Oral BID Joseline Hernández PA-C   50 mg at 01/29/25 0425    [Held by provider] multivitamin 1 tablet  1 tablet Oral Daily Joseline Hernández PA-C   1 tablet at 01/29/25 1257    [Held by provider] zinc sulfate (ZINCATE) 220 mg capsule - elemental zinc 50 mg  50 mg Oral Daily Joseline Hernández PA-C        sodium chloride flush 0.9 % injection 5-40 mL  5-40 mL IntraVENous 2 times per day Joseline Hernández PA-C   10 mL at 01/31/25 0742    sodium chloride flush 0.9 % injection 5-40 mL  5-40 mL IntraVENous PRN SteJoseline robert PA-C        0.9 % sodium chloride infusion   IntraVENous PRN Joseline Hernández PA-C        potassium chloride 20 mEq/50 mL IVPB (Central Line)  20 mEq IntraVENous PRN Joseline Hernández PA-C        Or    potassium chloride 10 mEq/100 mL IVPB (Peripheral Line)  10 mEq IntraVENous PRN Joseline Hernández PA-C        magnesium sulfate

## 2025-01-31 NOTE — PROCEDURES
PROCEDURE NOTE  Date: 1/31/2025   Name: Gabo Maldonado  YOB: 1940    Procedures  EKG completed, given to Shanelle WALKER

## 2025-01-31 NOTE — PROGRESS NOTES
Physician Progress Note      PATIENT:               TYRONE TAYLOR  [facility] #:                  [id number]  :                       1940  ADMIT DATE:       2025 9:27 PM  DISCH DATE:  RESPONDING  PROVIDER #:        Andre Hassan MD          QUERY TEXT:    Pt admitted with Left femoral neck fracture. Pt noted to have COPD   exacerbation versus pneumonia:  WBC  12.7, LA 1.9, temp 100.1, , Resp   23/min with Hypotension: BP 75/42 MAP 52, 53/12 MAP 26.   If possible, please   document in the progress notes and discharge summary if you are evaluating and   /or treating any of the following:    The medical record reflects the following:  Risk Factors: COPD exacerbation versus pneumonia, Acute Hypoxic Respiratory   Failure with Hypercapnia and Hypotension  Clinical Indicators: Pneumonia respiratory panel sent, showing Moraxella.  BP   75/42 MAP 52, 53/12 MAP 26.  WBC  12.7, LA 1.9, temp 100.1, , Resp   23/min  Treatment: intubation, LEVOPHED, Ceftriaxone, azithromycin, steroids, and   Central line placed, 1 unit packed red blood cells    Thank you.  Berna Johsnon RN, Clinical Documentation Integrity, Revenue   [employer], [facility], CRCR  Options provided:  -- Sepsis confirmed after study and was present on admission  -- Sepsis, developed following admission  -- No Sepsis, localized infection only  -- Other - I will add my own diagnosis  -- Disagree - Not applicable / Not valid  -- Disagree - Clinically unable to determine / Unknown  -- Refer to Clinical Documentation Reviewer    PROVIDER RESPONSE TEXT:    Sepsis confirmed after study and was present on admission    Query created by: Berna Johnson on 2025 10:07 AM      QUERY TEXT:    Pt admitted with Left femoral neck fracture. Pt noted to have COPD   exacerbation versus pneumonia:  WBC  12.7, LA 1.9, temp 100.1, , Resp   23/min with Hypotension: BP 75/42 MAP 52, 53/12 MAP 26.   If possible, please   document in the progress notes

## 2025-01-31 NOTE — PROGRESS NOTES
Patient has been successfully weaned from Mechanical Ventilation.  RSBI before extubation was 22 with EtCO2 of 39 and SpO2 of 98 on 40% FiO2.  Patient extubated and placed on 2 liters/min via nasal cannula.  Post extubation SpO2 is 94% with HR  101 bpm and RR 24 breaths/min.  Patient had strong cough that was non-productive.  Extubation Well tolerated by patient..

## 2025-01-31 NOTE — PROGRESS NOTES
[facility]  PHYSICAL THERAPY MISSED TREATMENT NOTE  [facility] OR    Date: 2025  Patient Name: Gabo Maldonado        MRN: [id number]   : 1940  (84 y.o.)  Gender: male                REASON FOR MISSED TREATMENT:  Pt off the floor for sx to have L hip hemiarthroplasty.  Will watch for post-op orders.    Deidra Thorne, MPT [id number]

## 2025-01-31 NOTE — PROGRESS NOTES
Per chart review for SBIRT per trauma services, pt is intubated which was completed yesterday. ISRAEL to continue to follow case and complete when appropriate.

## 2025-01-31 NOTE — PROGRESS NOTES
injection 5-40 mL, 5-40 mL, IntraVENous, 2 times per day  sodium chloride flush 0.9 % injection 5-40 mL, 5-40 mL, IntraVENous, PRN  0.9 % sodium chloride infusion, , IntraVENous, PRN  potassium chloride 20 mEq/50 mL IVPB (Central Line), 20 mEq, IntraVENous, PRN **OR** potassium chloride 10 mEq/100 mL IVPB (Peripheral Line), 10 mEq, IntraVENous, PRN  magnesium sulfate 2000 mg in 50 mL IVPB premix, 2,000 mg, IntraVENous, PRN  ondansetron (ZOFRAN-ODT) disintegrating tablet 4 mg, 4 mg, Oral, Q8H PRN **OR** ondansetron (ZOFRAN) injection 4 mg, 4 mg, IntraVENous, Q6H PRN  polyethylene glycol (GLYCOLAX) packet 17 g, 17 g, Oral, Daily  [Held by provider] traMADol (ULTRAM) tablet 50 mg, 50 mg, Oral, Q6H PRN **OR** [Held by provider] traMADol (ULTRAM) tablet 100 mg, 100 mg, Oral, Q6H PRN  0.9 % sodium chloride infusion, , IntraVENous, PRN  naloxone (NARCAN) injection 0.4 mg, 0.4 mg, IntraVENous, PRN        PLAN    Mr. Maldonado is hospital day 3, L femoral neck fracture    To ICU  NPO  Bedrest  Minimize post operative narcotic use  Clinical improvement since intubation  To OR this morning Dr Sean DIXON hip bonny  Surgical consent completed upon arrival, family updated on plan of care, all questions answered   Type and screen up to date  Nsgy signed off, imaging without acute process, event likely related to hypercapnia, no evidence of focal deficit  Maintain external cath

## 2025-01-31 NOTE — PROGRESS NOTES
CRITICAL CARE PROGRESS NOTE      Patient:  Gabo Maldonado    Unit/Bed:4D-09/009-A  MRN: [id number]   PCP: Pedro Escobedo DO  Date of Admission: 1/28/2025    Assessment and Plan(All pulmonary edema, renal failure, PE, and respiratory failure diagnoses are acute in nature unless otherwise specified):        Acute hypercapnic respiratory failure  Patient with increasing CO2 despite BiPAP therapy  83-->93-->99-->81-->128  COPD exacerbation versus pneumonia  Azithromycin  Rocephin  Steroids  Symbicort  Spiriva  Pneumonia panel respiratory cultures pending  Elevated white blood cell at 12.7  Patient afebrile  Hypotension  Hemorrhagic versus septic  Potentially in the setting of left femoral neck fracture  Will give 1 unit packed red blood cells  Trend H&H every 6 hours, currently stable at 13.8  Elevated white blood cell count at 12.7  Pneumonia respiratory panel sent, showing Moraxella  Ceftriaxone, azithromycin, steroids  Levophed currently at 8 mcgs, weaning  Left femoral neck fracture  Orthopedic consulted  With plan for hemiarthroplasty, but developed stroke symptoms  Surgery 1/31/2025  Hold anticoagulation  Hypertension  Home medications, held in the setting of hypotension  Lisinopril  Metoprolol  Hyperlipidemia  Lipid panel   Cholesterol 198  HDL 53    Triglycerides 137  No home medications  Stroke, rule out  Stroke alert called on 1/29/2025  Glucose within normal limits  Patient paralysis and limb ataxia  CT head negative  CTA head and neck negative  MRI showing no acute pathology  Patient given Narcan with improvement in symptomology  No intervention at this time         b.    Will reevaluate once patient is extubated, as he does follow commands intubated on sedation  Paroxysmal atrial fibrillation  [id number] 3  No history of  Continuous telemetry  Anticoagulation held in the setting of orthopedic injury as stated above  PETRA, improving  Baseline creatinine 0.8-0.9  Current BUN and creatinine

## 2025-01-31 NOTE — PROGRESS NOTES
[facility]  OCCUPATIONAL THERAPY MISSED TREATMENT NOTE  [facility] ICU 4D  4D-009-A      Date: 2025  Patient Name: Gabo Maldonado        CSN: [id number]   : 1940  (84 y.o.)  Gender: male                REASON FOR MISSED TREATMENT:  New OT eval and treatment orders received. Upon chart review, Orthopedic surgery notes report bedrest and \"To OR this morning Dr Sean DIXON hip bonny\". Will attempt back as patient medically appropriate.     Melba Lomeli, OTR/L [id number]

## 2025-01-31 NOTE — PROGRESS NOTES
Spiritual Health History and Assessment/Progress Note  [facility]    (P) Spiritual/Emotional Needs, Post-Procedural,  ,  ,      Name: Gabo Maldonado MRN: [id number]    Age: 84 y.o.     Sex: male   Language: English   Caodaism: Scientologist   Trauma     Date: 1/31/2025            Total Time Calculated: (P) 1 min              Spiritual Assessment began in [facility] ICU 4D        Referral/Consult From: (P) Rounding   Encounter Overview/Reason: (P) Spiritual/Emotional Needs, Post-Procedural  Service Provided For: (P) Patient    Irasema, Belief, Meaning:   Patient unable to assess at this time  Family/Friends No family/friends present      Importance and Influence:  Patient unable to assess at this time  Family/Friends No family/friends present    Community:  Patient Other: Patient is intubated and sedated post-operatively; unable to assess.  Family/Friends Other: Several family members were present as pt arrived on-floor, leaving between  being called away and return to patient. and No family/friends present    Assessment and Plan of Care:     Patient Interventions include: Other:  prayed for patient bedside.  Family/Friends Interventions include: No family/friends present    Patient Plan of Care: Spiritual Care available upon further referral  Family/Friends Plan of Care: No family/friends present    Electronically signed by Chaplain Jonel on 1/31/2025 at 1:06 PM

## 2025-01-31 NOTE — PROCEDURES
PROCEDURE NOTE  Date: 1/31/2025   Name: Gabo Maldonado  YOB: 1940    Procedures  12 lead EKG completed. Results handed to Shivani WALKER.

## 2025-01-31 NOTE — PROGRESS NOTES
[facility]  SPEECH THERAPY MISSED TREATMENT NOTE  [facility] OR      Date: 2025  Patient Name: Gabo Maldonado        MRN: [id number]    : 1940  (84 y.o.)    REASON FOR MISSED TREATMENT:  ST attempted to see patient this AM for completion of kytwmc-ozmjvfax-purrnbiec evaluation and clinical swallow evaluation; however, per discussion with DENISE Timmons patient currently FREDERICK for procedure at this time. ST to re-attempt at a later date/time as patient is medically appropriate and available.     Abbie Mendoza M.A., CCC-SLP [id number]

## 2025-02-01 ENCOUNTER — APPOINTMENT (OUTPATIENT)
Dept: GENERAL RADIOLOGY | Age: 85
DRG: 853 | End: 2025-02-01
Payer: MEDICARE

## 2025-02-01 LAB
ANION GAP SERPL CALC-SCNC: 10 MEQ/L (ref 8–16)
BUN SERPL-MCNC: 48 MG/DL (ref 7–22)
CALCIUM SERPL-MCNC: 9.1 MG/DL (ref 8.5–10.5)
CHLORIDE SERPL-SCNC: 105 MEQ/L (ref 98–111)
CO2 SERPL-SCNC: 26 MEQ/L (ref 23–33)
CREAT SERPL-MCNC: 1.2 MG/DL (ref 0.4–1.2)
DEPRECATED RDW RBC AUTO: 48 FL (ref 35–45)
EKG ATRIAL RATE: 94 BPM
EKG P AXIS: 84 DEGREES
EKG P-R INTERVAL: 180 MS
EKG Q-T INTERVAL: 346 MS
EKG QRS DURATION: 100 MS
EKG QTC CALCULATION (BAZETT): 432 MS
EKG R AXIS: -41 DEGREES
EKG T AXIS: 145 DEGREES
EKG VENTRICULAR RATE: 94 BPM
ERYTHROCYTE [DISTWIDTH] IN BLOOD BY AUTOMATED COUNT: 13.6 % (ref 11.5–14.5)
GFR SERPL CREATININE-BSD FRML MDRD: 59 ML/MIN/1.73M2
GLUCOSE SERPL-MCNC: 151 MG/DL (ref 70–108)
HCT VFR BLD AUTO: 37.5 % (ref 42–52)
HGB BLD-MCNC: 12.6 GM/DL (ref 14–18)
MAGNESIUM SERPL-MCNC: 2.5 MG/DL (ref 1.6–2.4)
MCH RBC QN AUTO: 32.3 PG (ref 26–33)
MCHC RBC AUTO-ENTMCNC: 33.6 GM/DL (ref 32.2–35.5)
MCV RBC AUTO: 96.2 FL (ref 80–94)
PLATELET # BLD AUTO: 118 THOU/MM3 (ref 130–400)
PMV BLD AUTO: 10.8 FL (ref 9.4–12.4)
POTASSIUM SERPL-SCNC: 4.6 MEQ/L (ref 3.5–5.2)
RBC # BLD AUTO: 3.9 MILL/MM3 (ref 4.7–6.1)
SODIUM SERPL-SCNC: 141 MEQ/L (ref 135–145)
TROPONIN, HIGH SENSITIVITY: 85 NG/L (ref 0–12)
WBC # BLD AUTO: 9.8 THOU/MM3 (ref 4.8–10.8)

## 2025-02-01 PROCEDURE — 92610 EVALUATE SWALLOWING FUNCTION: CPT

## 2025-02-01 PROCEDURE — 97530 THERAPEUTIC ACTIVITIES: CPT

## 2025-02-01 PROCEDURE — 93005 ELECTROCARDIOGRAM TRACING: CPT

## 2025-02-01 PROCEDURE — 6370000000 HC RX 637 (ALT 250 FOR IP)

## 2025-02-01 PROCEDURE — 97166 OT EVAL MOD COMPLEX 45 MIN: CPT

## 2025-02-01 PROCEDURE — 6370000000 HC RX 637 (ALT 250 FOR IP): Performed by: PHYSICIAN ASSISTANT

## 2025-02-01 PROCEDURE — 97116 GAIT TRAINING THERAPY: CPT

## 2025-02-01 PROCEDURE — 2500000003 HC RX 250 WO HCPCS: Performed by: PHYSICIAN ASSISTANT

## 2025-02-01 PROCEDURE — 94640 AIRWAY INHALATION TREATMENT: CPT

## 2025-02-01 PROCEDURE — 6360000002 HC RX W HCPCS: Performed by: PHYSICIAN ASSISTANT

## 2025-02-01 PROCEDURE — 73620 X-RAY EXAM OF FOOT: CPT

## 2025-02-01 PROCEDURE — 80048 BASIC METABOLIC PNL TOTAL CA: CPT

## 2025-02-01 PROCEDURE — 83735 ASSAY OF MAGNESIUM: CPT

## 2025-02-01 PROCEDURE — 97162 PT EVAL MOD COMPLEX 30 MIN: CPT

## 2025-02-01 PROCEDURE — 2060000000 HC ICU INTERMEDIATE R&B

## 2025-02-01 PROCEDURE — 84484 ASSAY OF TROPONIN QUANT: CPT

## 2025-02-01 PROCEDURE — 99233 SBSQ HOSP IP/OBS HIGH 50: CPT | Performed by: INTERNAL MEDICINE

## 2025-02-01 PROCEDURE — 94761 N-INVAS EAR/PLS OXIMETRY MLT: CPT

## 2025-02-01 PROCEDURE — 97535 SELF CARE MNGMENT TRAINING: CPT

## 2025-02-01 PROCEDURE — 2580000003 HC RX 258: Performed by: PHYSICIAN ASSISTANT

## 2025-02-01 PROCEDURE — 51798 US URINE CAPACITY MEASURE: CPT

## 2025-02-01 PROCEDURE — 2700000000 HC OXYGEN THERAPY PER DAY

## 2025-02-01 PROCEDURE — 85027 COMPLETE CBC AUTOMATED: CPT

## 2025-02-01 PROCEDURE — 36415 COLL VENOUS BLD VENIPUNCTURE: CPT

## 2025-02-01 PROCEDURE — 71045 X-RAY EXAM CHEST 1 VIEW: CPT

## 2025-02-01 RX ORDER — QUETIAPINE FUMARATE 25 MG/1
25 TABLET, FILM COATED ORAL NIGHTLY PRN
Status: DISPENSED | OUTPATIENT
Start: 2025-02-01

## 2025-02-01 RX ADMIN — WATER 2000 MG: 1 INJECTION INTRAMUSCULAR; INTRAVENOUS; SUBCUTANEOUS at 16:50

## 2025-02-01 RX ADMIN — BUDESONIDE AND FORMOTEROL FUMARATE DIHYDRATE 2 PUFF: 160; 4.5 AEROSOL RESPIRATORY (INHALATION) at 20:52

## 2025-02-01 RX ADMIN — Medication 6 MG: at 23:55

## 2025-02-01 RX ADMIN — SALINE NASAL SPRAY 1 SPRAY: 1.5 SOLUTION NASAL at 13:12

## 2025-02-01 RX ADMIN — HYDROCORTISONE SODIUM SUCCINATE 100 MG: 100 INJECTION, POWDER, FOR SOLUTION INTRAMUSCULAR; INTRAVENOUS at 15:29

## 2025-02-01 RX ADMIN — PANTOPRAZOLE SODIUM 40 MG: 40 INJECTION, POWDER, FOR SOLUTION INTRAVENOUS at 08:31

## 2025-02-01 RX ADMIN — BUDESONIDE AND FORMOTEROL FUMARATE DIHYDRATE 2 PUFF: 160; 4.5 AEROSOL RESPIRATORY (INHALATION) at 08:54

## 2025-02-01 RX ADMIN — AZITHROMYCIN MONOHYDRATE 500 MG: 500 INJECTION, POWDER, LYOPHILIZED, FOR SOLUTION INTRAVENOUS at 15:34

## 2025-02-01 RX ADMIN — HYDROCORTISONE SODIUM SUCCINATE 100 MG: 100 INJECTION, POWDER, FOR SOLUTION INTRAMUSCULAR; INTRAVENOUS at 00:14

## 2025-02-01 RX ADMIN — CHLORHEXIDINE GLUCONATE 15 ML: 1.2 RINSE ORAL at 20:12

## 2025-02-01 RX ADMIN — SODIUM CHLORIDE, PRESERVATIVE FREE 10 ML: 5 INJECTION INTRAVENOUS at 20:14

## 2025-02-01 RX ADMIN — TIOTROPIUM BROMIDE INHALATION SPRAY 2 PUFF: 3.12 SPRAY, METERED RESPIRATORY (INHALATION) at 08:54

## 2025-02-01 RX ADMIN — SODIUM CHLORIDE, PRESERVATIVE FREE 10 ML: 5 INJECTION INTRAVENOUS at 08:31

## 2025-02-01 RX ADMIN — ACETAMINOPHEN 1000 MG: 500 TABLET ORAL at 06:12

## 2025-02-01 RX ADMIN — ACETAMINOPHEN 1000 MG: 500 TABLET ORAL at 15:24

## 2025-02-01 RX ADMIN — POLYETHYLENE GLYCOL 3350 17 G: 17 POWDER, FOR SOLUTION ORAL at 08:31

## 2025-02-01 RX ADMIN — HYDROCORTISONE SODIUM SUCCINATE 100 MG: 100 INJECTION, POWDER, FOR SOLUTION INTRAMUSCULAR; INTRAVENOUS at 08:31

## 2025-02-01 RX ADMIN — ACETAMINOPHEN 1000 MG: 500 TABLET ORAL at 21:29

## 2025-02-01 ASSESSMENT — PAIN DESCRIPTION - LOCATION
LOCATION: LEG

## 2025-02-01 ASSESSMENT — PAIN DESCRIPTION - ONSET
ONSET: SUDDEN
ONSET: ON-GOING
ONSET: ON-GOING

## 2025-02-01 ASSESSMENT — PAIN DESCRIPTION - FREQUENCY
FREQUENCY: INTERMITTENT

## 2025-02-01 ASSESSMENT — PATIENT HEALTH QUESTIONNAIRE - PHQ9
1. LITTLE INTEREST OR PLEASURE IN DOING THINGS: NOT AT ALL
SUM OF ALL RESPONSES TO PHQ QUESTIONS 1-9: 0
SUM OF ALL RESPONSES TO PHQ9 QUESTIONS 1 & 2: 0
2. FEELING DOWN, DEPRESSED OR HOPELESS: NOT AT ALL
SUM OF ALL RESPONSES TO PHQ QUESTIONS 1-9: 0

## 2025-02-01 ASSESSMENT — PAIN DESCRIPTION - DESCRIPTORS
DESCRIPTORS: ACHING;DISCOMFORT
DESCRIPTORS: ACHING;SORE
DESCRIPTORS: ACHING;SORE
DESCRIPTORS: ACHING;DISCOMFORT

## 2025-02-01 ASSESSMENT — PAIN DESCRIPTION - DIRECTION: RADIATING_TOWARDS: LEFT FOOT

## 2025-02-01 ASSESSMENT — PAIN DESCRIPTION - PAIN TYPE
TYPE: SURGICAL PAIN
TYPE: ACUTE PAIN
TYPE: ACUTE PAIN

## 2025-02-01 ASSESSMENT — PAIN SCALES - GENERAL
PAINLEVEL_OUTOF10: 0
PAINLEVEL_OUTOF10: 4
PAINLEVEL_OUTOF10: 4
PAINLEVEL_OUTOF10: 2
PAINLEVEL_OUTOF10: 10

## 2025-02-01 ASSESSMENT — LIFESTYLE VARIABLES
HOW OFTEN DO YOU HAVE A DRINK CONTAINING ALCOHOL: NEVER
HOW MANY STANDARD DRINKS CONTAINING ALCOHOL DO YOU HAVE ON A TYPICAL DAY: PATIENT DOES NOT DRINK

## 2025-02-01 ASSESSMENT — PAIN DESCRIPTION - ORIENTATION
ORIENTATION: LEFT

## 2025-02-01 ASSESSMENT — PAIN - FUNCTIONAL ASSESSMENT
PAIN_FUNCTIONAL_ASSESSMENT: ACTIVITIES ARE NOT PREVENTED

## 2025-02-01 NOTE — PROCEDURES
PROCEDURE NOTE  Date: 2/1/2025   Name: Gabo Maldonado  YOB: 1940    Procedures  EKG completed

## 2025-02-01 NOTE — PROGRESS NOTES
[facility]  INPATIENT PHYSICAL THERAPY  EVALUATION  [facility] ICU 4D - 4D-09/009-A    Discharge Recommendations: Therapy recommended at discharge (IPR vs SNF)  Equipment Recommendations: No               Time In: 1023  Time Out: 1120  Timed Code Treatment Minutes: 45 Minutes  Minutes: 57          Date: 2025  Patient Name: Gabo Maldonado,  Gender:  male        MRN: [id number]  : 1940  (84 y.o.)      Referring Practitioner: Leonor Hampton PA-C  Diagnosis: Trauma  Additional Pertinent Hx: 84-year-old male with a past medical history of hypertension, erectile dysfunction, hyperlipidemia, pituitary macroadenoma, and COPD originally presented to [facility] after a fall down 2 concrete steps resulting in left-sided hip pain.  The patient was found to have a left femoral neck fracture for which orthopedics was consulted with plans for operative management the following day.  The patient developed strokelike symptoms that included a right-sided facial droop and right-sided ataxia progressing to the need for a stroke alert to be called.  Patient fell outside the window for TNK, and had no evidence of LVO, and improved with Narcan, so no intervention deemed necessary by neurology.  On the floor, the patient developed lethargy, and shown to be hypercapnic, so he was placed on BiPAP.  Despite this, including changes to the settings, the patient did become more hypercapnic, so he transferred to ICU for hypercapnic respiratory failure. LEFT HIP TOTAL ARTHROPLASTY  By Dr Estrada     Restrictions/Precautions:  Restrictions/Precautions: Surgical Protocols, Fall Risk, Weight Bearing  Left Lower Extremity Weight Bearing: Weight Bearing As Tolerated    Hip Precautions: Posterior hip precautions    Required Braces or Orthoses?: No      Subjective:  Chart Reviewed: Yes  Patient assessed for rehabilitation services?: Yes  Subjective: RN approved session. Pt pleasant and agreeable to

## 2025-02-01 NOTE — PROGRESS NOTES
CRITICAL CARE PROGRESS NOTE      Patient:  Gabo Maldonado    Unit/Bed:4D-09/009-A  YOB: 1940  MRN: [id number]   PCP: Pedro Escobedo DO  Date of Admission: 1/28/2025  Chief Complaint:-Hypercapnic respiratory failure    Assessment and Plan:    Acute on chronic hypercapnic respiratory failure: 2/2 COPD exacerbation.  Patient experienced worsening hypercapnia despite BiPAP therapy, intubated 1/30 for that reason.  Weaned down vent settings, RSBI 22, extubated to 2 L NC.  Continuing on 2 L NC.  Sepsis 2/2 pneumonia: Formerly in shock.  Sofa score initially 6 above baseline, CXR showing right-sided perihilar consolidation with pneumonia PCR panel positive for Moraxella.  Antibiotics azithromycin and Rocephin.  Continue 7 days of stress dose steroids, Solu-Cortef 100 mg 3 times daily.  Hypotension, improved: Formerly in shock, requiring Levophed.  Multifactorial, septic component as well as hemorrhage component in setting of left femoral neck fracture.  1 unit PRBCs administered.  Pressures improved, can consider resuming antihypertensives in stepwise fashion  Left femoral neck fracture s/p total hip arthroplasty 1/31: Tolerated procedure with orthopedic surgery well.  Ambulating as tolerated, treatment with PT OT  COPD, not in exacerbation: Respiratory issue due to pneumonia as described in sepsis 2/2 pneumonia section.  Stress dose steroids and antibiotics as described in that section. Continuing scheduled Symbicort and Spiriva, as well as albuterol as needed  PETRA: Baseline creatinine 0.8-0.9, patient was at baseline upon presentation but creatinine trended up to zenith 1.8, FENa 0.9% suggesting prerenal etiology.  Received 500 cc NS bolus followed by 75 cc/hour infusion for approximately 12 hours.  Reportedly developed signs of volume overload overnight 1/30 into 1/31, leading to cessation of infusion.  Creatinine downtrending to 1.2 as of 2/1  Continue trending daily BMP  Avoid nephrotoxins,

## 2025-02-01 NOTE — PROGRESS NOTES
Patient transferred to [facility] with all belongings and family at bedside. Report called to Ann WALKER.

## 2025-02-01 NOTE — PROGRESS NOTES
[facility]  INPATIENT OCCUPATIONAL THERAPY  STRZ ICU STEPDOWN TELEMETRY 4K  EVALUATION      Discharge Recommendations: Continue to assess pending progress, Patient would benefit from continued therapy after discharge (Inpatient therapy stay; not safe to go home at this time)  Equipment Recommendations: No (continue to assess for needs)        Time In: 1414  Time Out: 1522  Timed Code Treatment Minutes: 55 Minutes  Minutes: 68          Date: 2025  Patient Name: Gabo Maldonado,   Gender: male      MRN: [id number]  : 1940  (84 y.o.)  Referring Practitioner: Leonor Hampton PA-C  Diagnosis: Trauma  Additional Pertinent Hx: Per EMR: 84-year-old male with a past medical history of hypertension, erectile dysfunction, hyperlipidemia, pituitary macroadenoma, and COPD originally presented to [facility] after a fall down 2 concrete steps resulting in left-sided hip pain.  The patient was found to have a left femoral neck fracture for which orthopedics was consulted with plans for operative management the following day.  The patient developed strokelike symptoms that included a right-sided facial droop and right-sided ataxia progressing to the need for a stroke alert to be called.  Patient fell outside the window for TNK, and had no evidence of LVO, and improved with Narcan, so no intervention deemed necessary by neurology.  On the floor, the patient developed lethargy, and shown to be hypercapnic, so he was placed on BiPAP.  Despite this, including changes to the settings, the patient did become more hypercapnic, so he transferred to ICU for hypercapnic respiratory failure. LEFT HIP TOTAL ARTHROPLASTY  By Dr Estrada.\"    Restrictions/Precautions:  Restrictions/Precautions: Surgical Protocols, Fall Risk, Weight Bearing  Left Lower Extremity Weight Bearing: Weight Bearing As Tolerated  Position Activity Restriction  Hip Precautions: Posterior hip precautions    Subjective  Chart Reviewed: Yes,  independence and quality of life.    Patient Education:          Patient Education  Education Given To: Patient;Family  Education Provided: Role of Therapy;ADL Adaptive Strategies;Orientation;Fall Prevention Strategies;Transfer Training;Family Education;Mobility Training;Precautions  Education Method: Demonstration;Verbal  Barriers to Learning: Cognition  Education Outcome: Continued education needed    Plan:  Times Per Week: 6x-C  Current Treatment Recommendations: Strengthening, ROM, Balance training, Functional mobility training, Cognitive reorientation, Safety education & training, Patient/Caregiver education & training, Equipment evaluation, education, & procurement, Positioning, Self-Care / ADL, Home management training, Cognitive/Perceptual training.  See long-term goal time frame for expected duration of plan of care.  If no long-term goals established, a short length of stay is anticipated.    Goals:  Patient goals : return home IND  Short Term Goals  Time Frame for Short Term Goals: by discharge  Short Term Goal 1: Pt will complete ADL transfer w/ CGA via LRD.  Short Term Goal 2: Pt will complete LE dressing w/ Mod assist and LHAD PRN.  Short Term Goal 3: Pt will complete dynamic standing task >5 min for improved sinkside grooming tolerance.  Short Term Goal 4: Pt will complete functional mobility at household distances with 0-2 cues for safety awareness and technique.  Long Term Goals  Time Frame for Long Term Goals : No LTGs set 2/2 short ELOS    AM-PAC Inpatient Daily Activity Raw Score: 15  AM-PAC Inpatient ADL T-Scale Score : 34.69    Following session, patient left in safe position with all fall risk precautions in place.

## 2025-02-01 NOTE — PROGRESS NOTES
[facility]  SPEECH THERAPY  STRZ ICU STEPDOWN TELEMETRY 4K  Clinical Swallow Evaluation    Discharge Recommendations: Continue to Assess Pending Progress  DIET ORDER RECOMMENDATIONS AFTER EVALUATION: Regular solids + Thin liquids  Strategies:  Full Upright Position, Small Bite/Sip, Medications Whole with Thin, and Alternate Solids and Liquids     SLP Individual Minutes  Time In: 1403  Time Out: 1416  Minutes: 13  Timed Code Treatment Minutes: 0 Minutes       Date: 2025  Patient Name: Gabo Maldonado      CSN: [id number]   : 1940  (84 y.o.)  Gender: male   Referring Physician:  Joseline Hernández PA-C      Diagnosis: Trauma    History of Present Illness/Injury: Patient admitted with above medical diagnosis. See physician H&P for full history. Per medical chart review, \"Patient is an 84-year-old male PMH HTN, erectile dysfunction, HLD, pituitary macroadenoma, COPD who presented initially after a fall down 2 concrete steps with a chief complaint of left-sided hip pain.  Found to have left femoral neck fracture, orthopedics consulted and planned operative management for the next day.  Patient developed strokelike symptoms including right-sided facial droop, right-sided ataxia which precipitated stroke alert being called.  Patient was outside of the window for TNK, imaging revealing no evidence of LVO, improved with Narcan, no intervention administered.  Patient was admitted to the floors, found to be hypercapnic and placed on BiPAP.  Patient worsened while on BiPAP with repeat gases showing progressing hypercapnia and he was subsequently transferred to the ICU for acute on chronic hypercapnic respiratory failure.     ICU course:  : Admitted to ICU for acute on chronic hypercapnic respiratory failure.  : S/p left total hip arthroplasty with orthopedic surgery.  : Patient deemed stable for transfer out of ICU, orthopedic surgery okay with transfer to stepdown and assigning  swallow status. Please re-consult should further needs develop.     ST to complete [id number] evaluation on later date as schedule permits.    RECOMMENDATIONS/ASSESSMENT:  Instrumental Evaluation: Instrumental evaluation not indicated at this time.  Rehabilitation Potential: excellent    EDUCATION:  Learner: Patient, Significant Other, and Family  Education:  Reviewed results and recommendations of this evaluation, Reviewed diet and strategies, and Reviewed recommendations for follow-up  Evaluation of Education: Verbalizes understanding    PLAN:  No further speech therapy serviced indicated for: dysphagia intervention.   ST to complete [id number] evaluation on later date as schedule permits.      Coid Perez M.S., CCC-SLP [id number]

## 2025-02-01 NOTE — SIGNIFICANT EVENT
Patient stable for transfer out of ICU, signout given to Dr. Riley for allocation to hospitalist team.  Patient assigned to room 4K18.

## 2025-02-01 NOTE — PROGRESS NOTES
Orthopaedic Progress Note      SUBJECTIVE   Mr. Maldonado is post op day # 1     Patient notes patient is status post left total hip arthroplasty      OBJECTIVE      Physical    VITALS:  /60   Pulse (!) 101   Temp 98 °F (36.7 °C) (Oral)   Resp 16   Ht 1.727 m (5' 8\")   Wt 122.6 kg (270 lb 4.5 oz)   SpO2 96%   BMI 41.10 kg/m²   I/O last 3 completed shifts:  In: 3015.1 [P.O.:50; I.V.:2182.2; IV Piggyback:783]  Out: 750 [Urine:750]      Dressing is dry and intact.  He is able to flex extend toes flexing the ankle.  Denies any pain to palpation within the calf at this time.  He is alert and oriented to this time.      Data  CBC:   Lab Results   Component Value Date/Time    WBC 9.8 02/01/2025 04:40 AM    HGB 12.6 02/01/2025 04:40 AM     02/01/2025 04:40 AM     BMP:    Lab Results   Component Value Date/Time     02/01/2025 04:40 AM    K 4.6 02/01/2025 04:40 AM    K 5.3 10/19/2021 08:48 AM     02/01/2025 04:40 AM    CO2 26 02/01/2025 04:40 AM    BUN 48 02/01/2025 04:40 AM    CREATININE 1.2 02/01/2025 04:40 AM    CALCIUM 9.1 02/01/2025 04:40 AM    GLUCOSE 151 02/01/2025 04:40 AM     Uric Acid:  No components found for: \"URIC\"  PT/INR:    Lab Results   Component Value Date/Time    INR 1.21 01/31/2025 03:28 AM     Troponin:  No results found for: \"TROPONINI\"  Urine Culture:  No components found for: \"CURINE\"      Current Inpatient Medications    Current Facility-Administered Medications: azithromycin (ZITHROMAX) 500 mg in sodium chloride 0.9 % 250 mL IVPB ([id number]), 500 mg, IntraVENous, Q24H  acetaminophen (TYLENOL) tablet 1,000 mg, 1,000 mg, Oral, q8h  oxyCODONE (ROXICODONE) immediate release tablet 2.5 mg, 2.5 mg, Oral, Q6H PRN  morphine (PF) injection 1 mg, 1 mg, IntraVENous, Q4H PRN  glucose chewable tablet 16 g, 4 tablet, Oral, PRN  dextrose bolus 10% 125 mL, 125 mL, IntraVENous, PRN **OR** dextrose bolus 10% 250 mL, 250 mL, IntraVENous, PRN  glucagon injection 1 mg, 1 mg, SubCUTAneous,  PRN  dextrose 10 % infusion, , IntraVENous, Continuous PRN  etomidate (AMIDATE) injection 20 mg, 20 mg, IntraVENous, Once  midazolam (VERSED) injection 4 mg, 4 mg, IntraVENous, Once  fentaNYL (SUBLIMAZE) injection 100 mcg, 100 mcg, IntraVENous, Once  norepinephrine (LEVOPHED) 16 mg in sodium chloride 0.9 % 250 mL infusion (premix), 1-100 mcg/min, IntraVENous, Continuous  budesonide-formoterol (SYMBICORT) 160-4.5 MCG/ACT inhaler 2 puff, 2 puff, Inhalation, BID RT  tiotropium (SPIRIVA RESPIMAT) 2.5 MCG/ACT inhaler 2 puff, 2 puff, Inhalation, Daily RT  chlorhexidine (PERIDEX) 0.12 % solution 15 mL, 15 mL, Mouth/Throat, BID  cefTRIAXone (ROCEPHIN) 2,000 mg in sterile water 20 mL IV syringe, 2,000 mg, IntraVENous, Q24H  hydrocortisone sodium succinate PF (SOLU-CORTEF) injection 100 mg, 100 mg, IntraVENous, Q8H  0.9 % sodium chloride infusion, , IntraVENous, PRN  pantoprazole (PROTONIX) injection 40 mg, 40 mg, IntraVENous, Daily  albuterol sulfate HFA (PROVENTIL;VENTOLIN;PROAIR) 108 (90 Base) MCG/ACT inhaler 2 puff, 2 puff, Inhalation, Q6H PRN  [Held by provider] ascorbic acid (VITAMIN C) tablet 500 mg, 500 mg, Oral, Daily  [Held by provider] lisinopril (PRINIVIL;ZESTRIL) tablet 20 mg, 20 mg, Oral, BID  [Held by provider] Vitamin D (CHOLECALCIFEROL) tablet 1,000 Units, 1,000 Units, Oral, Daily  [Held by provider] metoprolol tartrate (LOPRESSOR) tablet 50 mg, 50 mg, Oral, BID  [Held by provider] multivitamin 1 tablet, 1 tablet, Oral, Daily  [Held by provider] zinc sulfate (ZINCATE) 220 mg capsule - elemental zinc 50 mg, 50 mg, Oral, Daily  sodium chloride flush 0.9 % injection 5-40 mL, 5-40 mL, IntraVENous, 2 times per day  sodium chloride flush 0.9 % injection 5-40 mL, 5-40 mL, IntraVENous, PRN  0.9 % sodium chloride infusion, , IntraVENous, PRN  potassium chloride 20 mEq/50 mL IVPB (Central Line), 20 mEq, IntraVENous, PRN **OR** potassium chloride 10 mEq/100 mL IVPB (Peripheral Line), 10 mEq, IntraVENous, PRN  magnesium

## 2025-02-01 NOTE — PROGRESS NOTES
Patient Weaning Progress    The patient's vent settings was able to be weaned this shift.      Ventilator settings that were weaned              [x] Mode   [x] Pressure support weaned   [] Fio2 weaned   [] Peep weaned             Spontaneous weaning trial  was attempted.

## 2025-02-02 ENCOUNTER — APPOINTMENT (OUTPATIENT)
Dept: GENERAL RADIOLOGY | Age: 85
DRG: 853 | End: 2025-02-02
Payer: MEDICARE

## 2025-02-02 VITALS
RESPIRATION RATE: 16 BRPM | OXYGEN SATURATION: 93 % | BODY MASS INDEX: 41.63 KG/M2 | HEART RATE: 88 BPM | HEIGHT: 68 IN | DIASTOLIC BLOOD PRESSURE: 94 MMHG | TEMPERATURE: 97.7 F | WEIGHT: 274.69 LBS | SYSTOLIC BLOOD PRESSURE: 150 MMHG

## 2025-02-02 PROBLEM — R65.20 SEPSIS WITH ACUTE HYPOXIC RESPIRATORY FAILURE WITHOUT SEPTIC SHOCK (HCC): Status: ACTIVE | Noted: 2025-02-02

## 2025-02-02 PROBLEM — R41.0 DELIRIUM: Status: ACTIVE | Noted: 2025-02-02

## 2025-02-02 PROBLEM — I50.30 DIASTOLIC HEART FAILURE (HCC): Status: ACTIVE | Noted: 2025-02-02

## 2025-02-02 PROBLEM — J96.01 ACUTE RESPIRATORY FAILURE WITH HYPOXIA: Status: ACTIVE | Noted: 2025-02-02

## 2025-02-02 PROBLEM — A41.9 SEPSIS WITH ACUTE HYPOXIC RESPIRATORY FAILURE WITHOUT SEPTIC SHOCK (HCC): Status: ACTIVE | Noted: 2025-02-02

## 2025-02-02 PROBLEM — J96.02 ACUTE RESPIRATORY FAILURE WITH HYPERCAPNIA: Status: ACTIVE | Noted: 2025-02-02

## 2025-02-02 PROBLEM — S72.002A LEFT DISPLACED FEMORAL NECK FRACTURE (HCC): Status: ACTIVE | Noted: 2025-02-02

## 2025-02-02 PROBLEM — I48.0 PAROXYSMAL ATRIAL FIBRILLATION (HCC): Status: ACTIVE | Noted: 2025-02-02

## 2025-02-02 PROBLEM — N17.9 AKI (ACUTE KIDNEY INJURY) (HCC): Status: ACTIVE | Noted: 2025-02-02

## 2025-02-02 PROBLEM — J96.01 SEPSIS WITH ACUTE HYPOXIC RESPIRATORY FAILURE WITHOUT SEPTIC SHOCK (HCC): Status: ACTIVE | Noted: 2025-02-02

## 2025-02-02 LAB
ANION GAP SERPL CALC-SCNC: 9 MEQ/L (ref 8–16)
BACTERIA SPEC RESP CULT: NORMAL
BUN SERPL-MCNC: 39 MG/DL (ref 7–22)
CALCIUM SERPL-MCNC: 9.4 MG/DL (ref 8.5–10.5)
CHLORIDE SERPL-SCNC: 107 MEQ/L (ref 98–111)
CO2 SERPL-SCNC: 26 MEQ/L (ref 23–33)
CREAT SERPL-MCNC: 0.8 MG/DL (ref 0.4–1.2)
DEPRECATED RDW RBC AUTO: 49.3 FL (ref 35–45)
ERYTHROCYTE [DISTWIDTH] IN BLOOD BY AUTOMATED COUNT: 13.7 % (ref 11.5–14.5)
GFR SERPL CREATININE-BSD FRML MDRD: 86 ML/MIN/1.73M2
GLUCOSE SERPL-MCNC: 113 MG/DL (ref 70–108)
GRAM STN SPEC: NORMAL
HCT VFR BLD AUTO: 39.5 % (ref 42–52)
HGB BLD-MCNC: 12.8 GM/DL (ref 14–18)
MAGNESIUM SERPL-MCNC: 2.6 MG/DL (ref 1.6–2.4)
MCH RBC QN AUTO: 31.9 PG (ref 26–33)
MCHC RBC AUTO-ENTMCNC: 32.4 GM/DL (ref 32.2–35.5)
MCV RBC AUTO: 98.5 FL (ref 80–94)
PLATELET # BLD AUTO: 124 THOU/MM3 (ref 130–400)
PMV BLD AUTO: 10.5 FL (ref 9.4–12.4)
POTASSIUM SERPL-SCNC: 4.3 MEQ/L (ref 3.5–5.2)
RBC # BLD AUTO: 4.01 MILL/MM3 (ref 4.7–6.1)
SODIUM SERPL-SCNC: 142 MEQ/L (ref 135–145)
WBC # BLD AUTO: 9.2 THOU/MM3 (ref 4.8–10.8)

## 2025-02-02 PROCEDURE — 99221 1ST HOSP IP/OBS SF/LOW 40: CPT | Performed by: PHYSICIAN ASSISTANT

## 2025-02-02 PROCEDURE — 6370000000 HC RX 637 (ALT 250 FOR IP): Performed by: PHYSICIAN ASSISTANT

## 2025-02-02 PROCEDURE — 94761 N-INVAS EAR/PLS OXIMETRY MLT: CPT

## 2025-02-02 PROCEDURE — 85027 COMPLETE CBC AUTOMATED: CPT

## 2025-02-02 PROCEDURE — 6370000000 HC RX 637 (ALT 250 FOR IP)

## 2025-02-02 PROCEDURE — 83735 ASSAY OF MAGNESIUM: CPT

## 2025-02-02 PROCEDURE — 94640 AIRWAY INHALATION TREATMENT: CPT

## 2025-02-02 PROCEDURE — 2700000000 HC OXYGEN THERAPY PER DAY

## 2025-02-02 PROCEDURE — 2500000003 HC RX 250 WO HCPCS: Performed by: PHYSICIAN ASSISTANT

## 2025-02-02 PROCEDURE — 99233 SBSQ HOSP IP/OBS HIGH 50: CPT | Performed by: STUDENT IN AN ORGANIZED HEALTH CARE EDUCATION/TRAINING PROGRAM

## 2025-02-02 PROCEDURE — 80048 BASIC METABOLIC PNL TOTAL CA: CPT

## 2025-02-02 PROCEDURE — 6360000002 HC RX W HCPCS: Performed by: PHYSICIAN ASSISTANT

## 2025-02-02 PROCEDURE — 36415 COLL VENOUS BLD VENIPUNCTURE: CPT

## 2025-02-02 PROCEDURE — 2060000000 HC ICU INTERMEDIATE R&B

## 2025-02-02 RX ORDER — ACETAMINOPHEN 500 MG
1000 TABLET ORAL EVERY 6 HOURS PRN
Status: DISPENSED | OUTPATIENT
Start: 2025-02-02

## 2025-02-02 RX ADMIN — BUDESONIDE AND FORMOTEROL FUMARATE DIHYDRATE 2 PUFF: 160; 4.5 AEROSOL RESPIRATORY (INHALATION) at 17:35

## 2025-02-02 RX ADMIN — PANTOPRAZOLE SODIUM 40 MG: 40 INJECTION, POWDER, FOR SOLUTION INTRAVENOUS at 09:43

## 2025-02-02 RX ADMIN — SODIUM CHLORIDE, PRESERVATIVE FREE 10 ML: 5 INJECTION INTRAVENOUS at 09:43

## 2025-02-02 RX ADMIN — ACETAMINOPHEN 1000 MG: 500 TABLET ORAL at 13:45

## 2025-02-02 RX ADMIN — SALINE NASAL SPRAY 1 SPRAY: 1.5 SOLUTION NASAL at 21:32

## 2025-02-02 RX ADMIN — TIOTROPIUM BROMIDE INHALATION SPRAY 2 PUFF: 3.12 SPRAY, METERED RESPIRATORY (INHALATION) at 06:46

## 2025-02-02 RX ADMIN — ACETAMINOPHEN 1000 MG: 500 TABLET ORAL at 21:36

## 2025-02-02 RX ADMIN — SODIUM CHLORIDE, PRESERVATIVE FREE 10 ML: 5 INJECTION INTRAVENOUS at 21:31

## 2025-02-02 RX ADMIN — Medication 6 MG: at 21:30

## 2025-02-02 RX ADMIN — SALINE NASAL SPRAY 1 SPRAY: 1.5 SOLUTION NASAL at 11:51

## 2025-02-02 RX ADMIN — CHLORHEXIDINE GLUCONATE 15 ML: 1.2 RINSE ORAL at 21:30

## 2025-02-02 RX ADMIN — WATER 2000 MG: 1 INJECTION INTRAMUSCULAR; INTRAVENOUS; SUBCUTANEOUS at 16:20

## 2025-02-02 RX ADMIN — BUDESONIDE AND FORMOTEROL FUMARATE DIHYDRATE 2 PUFF: 160; 4.5 AEROSOL RESPIRATORY (INHALATION) at 06:47

## 2025-02-02 RX ADMIN — ACETAMINOPHEN 1000 MG: 500 TABLET ORAL at 06:52

## 2025-02-02 RX ADMIN — POLYETHYLENE GLYCOL 3350 17 G: 17 POWDER, FOR SOLUTION ORAL at 09:43

## 2025-02-02 ASSESSMENT — PAIN SCALES - WONG BAKER: WONGBAKER_NUMERICALRESPONSE: NO HURT

## 2025-02-02 ASSESSMENT — PAIN SCALES - GENERAL
PAINLEVEL_OUTOF10: 5
PAINLEVEL_OUTOF10: 0
PAINLEVEL_OUTOF10: 3
PAINLEVEL_OUTOF10: 0

## 2025-02-02 ASSESSMENT — PAIN - FUNCTIONAL ASSESSMENT: PAIN_FUNCTIONAL_ASSESSMENT: ACTIVITIES ARE NOT PREVENTED

## 2025-02-02 ASSESSMENT — PAIN DESCRIPTION - LOCATION
LOCATION: LEG
LOCATION: LEG

## 2025-02-02 ASSESSMENT — PAIN DESCRIPTION - DESCRIPTORS
DESCRIPTORS: ACHING;DISCOMFORT
DESCRIPTORS: ACHING;DISCOMFORT

## 2025-02-02 ASSESSMENT — PAIN DESCRIPTION - ONSET: ONSET: GRADUAL

## 2025-02-02 ASSESSMENT — PAIN DESCRIPTION - ORIENTATION
ORIENTATION: LEFT
ORIENTATION: LEFT

## 2025-02-02 ASSESSMENT — PAIN DESCRIPTION - FREQUENCY: FREQUENCY: INTERMITTENT

## 2025-02-02 ASSESSMENT — PAIN DESCRIPTION - PAIN TYPE: TYPE: SURGICAL PAIN

## 2025-02-02 NOTE — PROGRESS NOTES
At 2330 pt became increasingly agitated, restless, and confused. Pt was hallucinating in the room and trying to get out of bed, was verbally aggressive and threatening to leave. Residents and security at bedside. Dr. Kern held the pts solu-cortef 100mg due to possible steroid induced psychosis. Melatonin 6mg and PRN quetiapine 25mg ordered. Virtual  ordered. Pt now calm and alert and oriented x4. Patient safe in bed, bed alarm on, and call light in reach.

## 2025-02-02 NOTE — PROGRESS NOTES
chlorhexidine  15 mL Mouth/Throat BID    cefTRIAXone (ROCEPHIN) IV  2,000 mg IntraVENous Q24H    pantoprazole  40 mg IntraVENous Daily    [Held by provider] ascorbic acid  500 mg Oral Daily    [Held by provider] lisinopril  20 mg Oral BID    [Held by provider] Vitamin D  1,000 Units Oral Daily    [Held by provider] metoprolol tartrate  50 mg Oral BID    [Held by provider] multivitamin  1 tablet Oral Daily    [Held by provider] zinc sulfate  50 mg Oral Daily    sodium chloride flush  5-40 mL IntraVENous 2 times per day    polyethylene glycol  17 g Oral Daily    PRN Meds: sodium chloride, QUEtiapine, oxyCODONE, morphine, glucose, dextrose bolus **OR** dextrose bolus, glucagon (rDNA), dextrose, sodium chloride, albuterol sulfate HFA, sodium chloride flush, sodium chloride, potassium chloride **OR** potassium chloride, magnesium sulfate, ondansetron **OR** ondansetron, sodium chloride, naloxone    Exam:  /71   Pulse 76   Temp 97.5 °F (36.4 °C) (Oral)   Resp 16   Ht 1.727 m (5' 8\")   Wt 124.6 kg (274 lb 11.1 oz)   SpO2 93%   BMI 41.77 kg/m²     Physical Exam  Constitutional:       General: He is not in acute distress.     Appearance: Normal appearance. He is not diaphoretic.   HENT:      Head: Normocephalic and atraumatic.   Cardiovascular:      Rate and Rhythm: Normal rate and regular rhythm.      Pulses: Normal pulses.      Heart sounds: Normal heart sounds. No murmur heard.     No gallop.   Pulmonary:      Effort: Pulmonary effort is normal. No respiratory distress.      Breath sounds: Normal breath sounds. No wheezing, rhonchi or rales.   Abdominal:      General: Abdomen is flat. Bowel sounds are normal.      Palpations: Abdomen is soft.      Tenderness: There is no abdominal tenderness. There is no right CVA tenderness, left CVA tenderness, guarding or rebound.   Musculoskeletal:         General: No swelling or tenderness. Moves all extremities     Right lower leg: No edema.      Left lower leg: No

## 2025-02-02 NOTE — PROGRESS NOTES
Orthopaedic Progress Note      SUBJECTIVE   Mr. Maldonado is post op day # 2     Patient notes status post left total hip arthroplasty.  X-rays left foot negative for any bony abnormality acute fractures      OBJECTIVE      Physical    VITALS:  BP (!) 138/93   Pulse 90   Temp 98.1 °F (36.7 °C) (Oral) Comment: VS at 0600 instead of 0400 due to provider preference  Resp 16   Ht 1.727 m (5' 8\")   Wt 124.6 kg (274 lb 11.1 oz)   SpO2 92%   BMI 41.77 kg/m²   I/O last 3 completed shifts:  In: 463.2 [P.O.:200; I.V.:0.3; IV Piggyback:262.9]  Out: 1758 [Urine:1758]      Dressing is dry and intact to the left lower extremity.  He is able to flex and extend toes.  Denies any pain palpation within the calf at this time.  Patient is alert and oriented x 3 at this time.      Data  CBC:   Lab Results   Component Value Date/Time    WBC 9.2 02/02/2025 05:35 AM    HGB 12.8 02/02/2025 05:35 AM     02/02/2025 05:35 AM     BMP:    Lab Results   Component Value Date/Time     02/02/2025 05:35 AM    K 4.3 02/02/2025 05:35 AM    K 5.3 10/19/2021 08:48 AM     02/02/2025 05:35 AM    CO2 26 02/02/2025 05:35 AM    BUN 39 02/02/2025 05:35 AM    CREATININE 0.8 02/02/2025 05:35 AM    CALCIUM 9.4 02/02/2025 05:35 AM    GLUCOSE 113 02/02/2025 05:35 AM     Uric Acid:  No components found for: \"URIC\"  PT/INR:    Lab Results   Component Value Date/Time    INR 1.21 01/31/2025 03:28 AM     Troponin:  No results found for: \"TROPONINI\"  Urine Culture:  No components found for: \"CURINE\"      Current Inpatient Medications    Current Facility-Administered Medications: sodium chloride (OCEAN, BABY AYR) 0.65 % nasal spray 1 spray, 1 spray, Each Nostril, Q6H PRN  melatonin tablet 6 mg, 6 mg, Oral, Nightly  QUEtiapine (SEROQUEL) tablet 25 mg, 25 mg, Oral, Nightly PRN  acetaminophen (TYLENOL) tablet 1,000 mg, 1,000 mg, Oral, q8h  oxyCODONE (ROXICODONE) immediate release tablet 2.5 mg, 2.5 mg, Oral, Q6H PRN  morphine (PF) injection 1 mg, 1

## 2025-02-03 ENCOUNTER — APPOINTMENT (OUTPATIENT)
Dept: GENERAL RADIOLOGY | Age: 85
DRG: 853 | End: 2025-02-03
Payer: MEDICARE

## 2025-02-03 LAB
ALBUMIN SERPL BCG-MCNC: 2.9 G/DL (ref 3.5–5.1)
ALP SERPL-CCNC: 47 U/L (ref 38–126)
ALT SERPL W/O P-5'-P-CCNC: 29 U/L (ref 11–66)
ANION GAP SERPL CALC-SCNC: 11 MEQ/L (ref 8–16)
AST SERPL-CCNC: 62 U/L (ref 5–40)
BILIRUB CONJ SERPL-MCNC: 0.2 MG/DL (ref 0.1–13.8)
BILIRUB SERPL-MCNC: 0.5 MG/DL (ref 0.3–1.2)
BUN SERPL-MCNC: 27 MG/DL (ref 7–22)
CALCIUM SERPL-MCNC: 9.1 MG/DL (ref 8.5–10.5)
CHLORIDE SERPL-SCNC: 105 MEQ/L (ref 98–111)
CO2 SERPL-SCNC: 26 MEQ/L (ref 23–33)
CREAT SERPL-MCNC: 0.8 MG/DL (ref 0.4–1.2)
DEPRECATED RDW RBC AUTO: 47.6 FL (ref 35–45)
ERYTHROCYTE [DISTWIDTH] IN BLOOD BY AUTOMATED COUNT: 13.5 % (ref 11.5–14.5)
FIBRINOGEN PPP-MCNC: 228 MG/100ML (ref 155–475)
FOLATE SERPL-MCNC: 17.9 NG/ML (ref 4.8–24.2)
FSP PPP LA-ACNC: > 20 MCG/ML
GFR SERPL CREATININE-BSD FRML MDRD: 86 ML/MIN/1.73M2
GLUCOSE SERPL-MCNC: 113 MG/DL (ref 70–108)
HAPTOGLOB SERPL-MCNC: 231 MG/DL (ref 30–200)
HCT VFR BLD AUTO: 36.1 % (ref 42–52)
HGB BLD-MCNC: 12.1 GM/DL (ref 14–18)
LDH SERPL L TO P-CCNC: 220 U/L (ref 100–190)
MAGNESIUM SERPL-MCNC: 2.1 MG/DL (ref 1.6–2.4)
MCH RBC QN AUTO: 32.1 PG (ref 26–33)
MCHC RBC AUTO-ENTMCNC: 33.5 GM/DL (ref 32.2–35.5)
MCV RBC AUTO: 95.8 FL (ref 80–94)
PLATELET # BLD AUTO: 121 THOU/MM3 (ref 130–400)
PMV BLD AUTO: 10.8 FL (ref 9.4–12.4)
POTASSIUM SERPL-SCNC: 3.8 MEQ/L (ref 3.5–5.2)
PROT SERPL-MCNC: 5.1 G/DL (ref 6.1–8)
RBC # BLD AUTO: 3.77 MILL/MM3 (ref 4.7–6.1)
REVIEWED BY: NORMAL
SMEAR REVIEW: NORMAL
SODIUM SERPL-SCNC: 142 MEQ/L (ref 135–145)
VIT B12 SERPL-MCNC: 701 PG/ML (ref 211–911)
WBC # BLD AUTO: 6.9 THOU/MM3 (ref 4.8–10.8)

## 2025-02-03 PROCEDURE — 85384 FIBRINOGEN ACTIVITY: CPT

## 2025-02-03 PROCEDURE — 82607 VITAMIN B-12: CPT

## 2025-02-03 PROCEDURE — 1200000003 HC TELEMETRY R&B

## 2025-02-03 PROCEDURE — 6370000000 HC RX 637 (ALT 250 FOR IP)

## 2025-02-03 PROCEDURE — 6370000000 HC RX 637 (ALT 250 FOR IP): Performed by: PHYSICIAN ASSISTANT

## 2025-02-03 PROCEDURE — 92523 SPEECH SOUND LANG COMPREHEN: CPT

## 2025-02-03 PROCEDURE — 83735 ASSAY OF MAGNESIUM: CPT

## 2025-02-03 PROCEDURE — 99233 SBSQ HOSP IP/OBS HIGH 50: CPT | Performed by: STUDENT IN AN ORGANIZED HEALTH CARE EDUCATION/TRAINING PROGRAM

## 2025-02-03 PROCEDURE — 6370000000 HC RX 637 (ALT 250 FOR IP): Performed by: STUDENT IN AN ORGANIZED HEALTH CARE EDUCATION/TRAINING PROGRAM

## 2025-02-03 PROCEDURE — 94761 N-INVAS EAR/PLS OXIMETRY MLT: CPT

## 2025-02-03 PROCEDURE — 36415 COLL VENOUS BLD VENIPUNCTURE: CPT

## 2025-02-03 PROCEDURE — 97530 THERAPEUTIC ACTIVITIES: CPT

## 2025-02-03 PROCEDURE — 6360000002 HC RX W HCPCS: Performed by: PHYSICIAN ASSISTANT

## 2025-02-03 PROCEDURE — 82248 BILIRUBIN DIRECT: CPT

## 2025-02-03 PROCEDURE — 80053 COMPREHEN METABOLIC PANEL: CPT

## 2025-02-03 PROCEDURE — 85362 FIBRIN DEGRADATION PRODUCTS: CPT

## 2025-02-03 PROCEDURE — 2500000003 HC RX 250 WO HCPCS: Performed by: PHYSICIAN ASSISTANT

## 2025-02-03 PROCEDURE — 82746 ASSAY OF FOLIC ACID SERUM: CPT

## 2025-02-03 PROCEDURE — 85027 COMPLETE CBC AUTOMATED: CPT

## 2025-02-03 PROCEDURE — 83615 LACTATE (LD) (LDH) ENZYME: CPT

## 2025-02-03 PROCEDURE — 1200000000 HC SEMI PRIVATE

## 2025-02-03 PROCEDURE — 97535 SELF CARE MNGMENT TRAINING: CPT

## 2025-02-03 PROCEDURE — 94640 AIRWAY INHALATION TREATMENT: CPT

## 2025-02-03 PROCEDURE — 97110 THERAPEUTIC EXERCISES: CPT

## 2025-02-03 PROCEDURE — 83010 ASSAY OF HAPTOGLOBIN QUANT: CPT

## 2025-02-03 RX ORDER — ENOXAPARIN SODIUM 100 MG/ML
30 INJECTION SUBCUTANEOUS DAILY
Status: DISCONTINUED | OUTPATIENT
Start: 2025-02-04 | End: 2025-02-03

## 2025-02-03 RX ORDER — ENOXAPARIN SODIUM 100 MG/ML
30 INJECTION SUBCUTANEOUS DAILY
Status: DISCONTINUED | OUTPATIENT
Start: 2025-02-03 | End: 2025-02-03

## 2025-02-03 RX ORDER — SENNOSIDES A AND B 8.6 MG/1
2 TABLET, FILM COATED ORAL NIGHTLY
Status: DISCONTINUED | OUTPATIENT
Start: 2025-02-03 | End: 2025-02-10 | Stop reason: HOSPADM

## 2025-02-03 RX ORDER — ENOXAPARIN SODIUM 100 MG/ML
30 INJECTION SUBCUTANEOUS 2 TIMES DAILY
Status: DISCONTINUED | OUTPATIENT
Start: 2025-02-03 | End: 2025-02-10 | Stop reason: HOSPADM

## 2025-02-03 RX ORDER — CAPSAICIN 0.025 %
CREAM (GRAM) TOPICAL 2 TIMES DAILY
Status: DISCONTINUED | OUTPATIENT
Start: 2025-02-03 | End: 2025-02-10 | Stop reason: HOSPADM

## 2025-02-03 RX ADMIN — TIOTROPIUM BROMIDE INHALATION SPRAY 2 PUFF: 3.12 SPRAY, METERED RESPIRATORY (INHALATION) at 08:41

## 2025-02-03 RX ADMIN — Medication 2 PUFF: at 08:41

## 2025-02-03 RX ADMIN — POLYETHYLENE GLYCOL 3350 17 G: 17 POWDER, FOR SOLUTION ORAL at 09:55

## 2025-02-03 RX ADMIN — ENOXAPARIN SODIUM 30 MG: 100 INJECTION SUBCUTANEOUS at 09:54

## 2025-02-03 RX ADMIN — Medication 2 PUFF: at 20:27

## 2025-02-03 RX ADMIN — WATER 2000 MG: 1 INJECTION INTRAMUSCULAR; INTRAVENOUS; SUBCUTANEOUS at 16:56

## 2025-02-03 RX ADMIN — SODIUM CHLORIDE, PRESERVATIVE FREE 10 ML: 5 INJECTION INTRAVENOUS at 09:53

## 2025-02-03 RX ADMIN — ENOXAPARIN SODIUM 30 MG: 100 INJECTION SUBCUTANEOUS at 21:34

## 2025-02-03 RX ADMIN — ACETAMINOPHEN 1000 MG: 500 TABLET ORAL at 10:04

## 2025-02-03 RX ADMIN — CHLORHEXIDINE GLUCONATE 15 ML: 1.2 RINSE ORAL at 21:34

## 2025-02-03 RX ADMIN — SALINE NASAL SPRAY 1 SPRAY: 1.5 SOLUTION NASAL at 13:47

## 2025-02-03 RX ADMIN — CAPSAICIN: 0.25 CREAM TOPICAL at 13:44

## 2025-02-03 RX ADMIN — Medication 6 MG: at 21:34

## 2025-02-03 RX ADMIN — ACETAMINOPHEN 1000 MG: 500 TABLET ORAL at 03:56

## 2025-02-03 RX ADMIN — CHLORHEXIDINE GLUCONATE 15 ML: 1.2 RINSE ORAL at 09:55

## 2025-02-03 RX ADMIN — SENNOSIDES 17.2 MG: 8.6 TABLET, COATED ORAL at 21:34

## 2025-02-03 RX ADMIN — ACETAMINOPHEN 1000 MG: 500 TABLET ORAL at 16:08

## 2025-02-03 RX ADMIN — SODIUM CHLORIDE, PRESERVATIVE FREE 10 ML: 5 INJECTION INTRAVENOUS at 21:35

## 2025-02-03 RX ADMIN — CAPSAICIN: 0.25 CREAM TOPICAL at 21:34

## 2025-02-03 ASSESSMENT — PAIN SCALES - WONG BAKER
WONGBAKER_NUMERICALRESPONSE: NO HURT
WONGBAKER_NUMERICALRESPONSE: NO HURT

## 2025-02-03 ASSESSMENT — PAIN DESCRIPTION - PAIN TYPE
TYPE: SURGICAL PAIN

## 2025-02-03 ASSESSMENT — PAIN DESCRIPTION - ORIENTATION
ORIENTATION: LEFT

## 2025-02-03 ASSESSMENT — PAIN DESCRIPTION - LOCATION
LOCATION: LEG
LOCATION: HIP
LOCATION: LEG

## 2025-02-03 ASSESSMENT — PAIN DESCRIPTION - ONSET
ONSET: ON-GOING

## 2025-02-03 ASSESSMENT — PAIN DESCRIPTION - DESCRIPTORS
DESCRIPTORS: ACHING;DISCOMFORT
DESCRIPTORS: ACHING
DESCRIPTORS: ACHING
DESCRIPTORS: ACHING;DISCOMFORT
DESCRIPTORS: ACHING
DESCRIPTORS: ACHING;DISCOMFORT

## 2025-02-03 ASSESSMENT — PAIN DESCRIPTION - FREQUENCY
FREQUENCY: CONTINUOUS

## 2025-02-03 ASSESSMENT — PAIN SCALES - GENERAL
PAINLEVEL_OUTOF10: 8
PAINLEVEL_OUTOF10: 4
PAINLEVEL_OUTOF10: 8
PAINLEVEL_OUTOF10: 5
PAINLEVEL_OUTOF10: 5
PAINLEVEL_OUTOF10: 4
PAINLEVEL_OUTOF10: 0
PAINLEVEL_OUTOF10: 5
PAINLEVEL_OUTOF10: 5
PAINLEVEL_OUTOF10: 6

## 2025-02-03 ASSESSMENT — PAIN - FUNCTIONAL ASSESSMENT
PAIN_FUNCTIONAL_ASSESSMENT: ACTIVITIES ARE NOT PREVENTED

## 2025-02-03 NOTE — ANESTHESIA POSTPROCEDURE EVALUATION
Department of Anesthesiology  Postprocedure Note    Patient: Gabo Maldonado  MRN: [id number]  YOB: 1940  Date of evaluation: 2/3/2025    Procedure Summary       Date: 01/31/25 Room / Location: [facility] OR  / [facility] OR    Anesthesia Start: 0812 Anesthesia Stop: 1002    Procedure: LEFT HIP TOTAL ARTHROPLASTY Diagnosis:       Hip pain, unspecified laterality      (Hip pain, unspecified laterality [M25.559])    Surgeons: Isaac Estrada MD Responsible Provider: Everett Cm MD    Anesthesia Type: General ASA Status: 3            Anesthesia Type: General    Edna Phase I:      Edna Phase II:      Anesthesia Post Evaluation    Patient location during evaluation: floor  Patient participation: complete - patient participated  Level of consciousness: awake and alert  Airway patency: patent  Nausea & Vomiting: no nausea  Cardiovascular status: blood pressure returned to baseline and hemodynamically stable  Respiratory status: acceptable and spontaneous ventilation  Hydration status: euvolemic  Pain management: adequate    No notable events documented.

## 2025-02-03 NOTE — PROGRESS NOTES
improved sinkside grooming tolerance.  Short Term Goal 4: Pt will complete functional mobility at household distances with 0-2 cues for safety awareness and technique.  Long Term Goals  Time Frame for Long Term Goals : No LTGs set 2/2 short ELOS    Following session, patient left in safe position with all fall risk precautions in place.

## 2025-02-03 NOTE — PROGRESS NOTES
Pharmacist - Provider Communication    The following stress ulcer prophylaxis agent has been selected for discontinuation based on not meeting the criteria listed below: pantoprazole IV    1. Patient does not have any exclusions to the Automatic Discontinuation Policy (check the boxes):  [x] Not on PPI/H2RA prior to admission  [x] No acute GI bleed/positive stool occult blood  Not on continuous pantoprazole infusion or pantoprazole 40 mg IV BID  [x] Not on dual antiplatelet therapy or dual antithrombotic therapy  [x] Not being used for the following indications:  H. Pylori treatment  Gastric or duodenal ulcer  GERD  Zollinger-Hinton Syndrome    2. Patient does not meet Stress Ulcer Prophylaxis Criteria  Any one of the following independent risk factors:  1) Coagulopathy (INR >1.5 [not on anticoagulation], platelets <50,000, aPTT>2x normal)  2) Mechanical ventilation >48 hours or potential for 48 hours    Any two or more of the followin) Occult or overt bleeding for >6 days  2) ICU stay >1 week  3) High dose corticosteroid (>250 mg/day hydrocortisone or >60 mg/day prednisone or >50 mg/day methylprednisolone or >10 mg/day dexamethasone)  4) History of gastric ulcer/bleeding within 1 year  5) Garza covering >35% BSA  6) Head injury with GCS < or equal to 10  7) Spinal cord injury  8) Hepatic or renal transplant  9) Sepsis/septic shock  10) Partial hepatectomy  11) Hepatic failure (defined as total bilirubin > 5 mg/dL, AST > 150 or ALT > 150)  12) Multiple traumas (defined as > 1 body region or Injury Severity Score of > or equal to 16)    If the prescriber doesn't feel this discontinuation is appropriate, please re-order the medication and place \"SUP appropriate per prescriber\" in comments of the order.    If you would like further assistance or have questions, please contact pharmacy.    Criss Don PharmD 2/3/2025 9:01 AM

## 2025-02-03 NOTE — PROGRESS NOTES
Hospitalist Progress Note  Internal Medicine Resident      Patient: Gabo Maldonado 84 y.o. male      Unit/Bed: -18/018-A    Admit Date: 1/28/2025      ASSESSMENT AND PLAN  Left femoral neck fracture s/p total hip arthroplasty 1/31: Secondary to fall at home  Orthopedic surgery primary on case, patient tolerating PTOT well, minimal pain from surgical site    Agitation/delirium: Overnight 2/1/2025 patient reported have agitation and aggressive behavior when awoken at night, ultimately able to be redirected, and started on Seroquel 25 mg.  Continue Seroquel 25 mg at night    Acute on chronic hypoxic respiratory failure secondary to COPD exacerbation: Resolved.  Originally intubated 1/30 for worsening hypercapnia despite BiPAP therapy, successfully weaned off of the vent and extubated to 2 L nasal cannula.  Currently saturating well on room air    Sepsis secondary to Moraxella pneumonia (improving): Originally in septic shock with a sofa score of 6, chest x-ray urgently showing right sided perihilar consolidation.  Azithromycin complete  Rocephin 2 g 5-day duration starting 1/30  Steroids on hold secondary to agitation    New onset A-fib: [id number] 3, not on coagulation at home.  Anticoagulation currently on hold in setting of recent surgery will coordinate with surgery team to restart patient's anticoagulation  Will consult pharmacy for price check for DOAC medications, plan to start Eliquis when amenable    PETRA, resolved (creatinine at baseline): Baseline creatinine 0.8-0.9, original presentation creatinine 1.8, urine studies suggesting prerenal etiology.    Hypertension: Home lisinopril and metoprolol tartrate on hold secondary to patient originally having hypotension (currently normotensive without antihypertensive medications)    Hyperlipidemia: Not currently on any statin medication    Diastolic heart failure with preserved ejection fraction: Echo 1/30/2025 showing EF 60 to 65% with diastolic

## 2025-02-03 NOTE — PROGRESS NOTES
Pharmacist Review and Automatic Dose Adjustment of Prophylactic Enoxaparin      The reviewing pharmacist has made an adjustment to the ordered enoxaparin dose or converted to UFH per the approved [facility] protocol and table as identified below.        Gabo Maldonado is a 84 y.o. male.     Recent Labs     02/01/25  0440 02/02/25  0535 02/03/25  0553   CREATININE 1.2 0.8 0.8       Estimated Creatinine Clearance: 87 mL/min (based on SCr of 0.8 mg/dL).    Height:   Ht Readings from Last 1 Encounters:   01/29/25 1.727 m (5' 8\")     Weight:  Wt Readings from Last 1 Encounters:   02/03/25 126.4 kg (278 lb 10.6 oz)               Plan: Based upon the patient's weight and renal function     Ordered: Enoxaparin 30 mg SUBQ daily     Changed/converted to     New Order: Enoxaparin  30 mg SUBQ BID      Thank you,  Leonor Don, [facility]  2/3/2025, 7:10 AM

## 2025-02-03 NOTE — PROGRESS NOTES
[facility]  INPATIENT REHABILITATION  ADMISSIONS COORDINATOR CONSULT    Referral Type: internal    Patient Name: Gabo Maldonado      MRN: [id number]    : 1940  (84 y.o.)  Gender: male   Race:White (non-)     Payor Source: Payor: [facility] MEDICARE / Plan: AET MEDICARE-ADVANTAGE PPO / Product Type: Medicare /   Secondary Payor Source:      Isolation Status: No active isolations    Lives With: Spouse  Type of Home: House  Home Layout: Two level, Bed/Bath upstairs, 1/2 bath on main level  Home Access: Stairs to enter with rails  Entrance Stairs - Number of Steps: 4     Occupation: Retired  Additional Comments: ind no AD at baseline    What is treatment plan?  Disciplines Required upon Admission to Inpatient Rehabilitation: Physical Therapy and Occupational Therapy  Post operative: Yes  Fall: Yes  Dialysis: No  Diet: ADULT DIET; Regular; 4 carb choices (60 gm/meal); Low Sodium (2 gm); 2000 ml  Recommend SNF at discharge.

## 2025-02-03 NOTE — RT PROTOCOL NOTE
RT Inhaler-Nebulizer Bronchodilator Protocol Note    There is a bronchodilator order in the chart from a provider indicating to follow the RT Bronchodilator Protocol and there is an “Initiate RT Inhaler-Nebulizer Bronchodilator Protocol” order as well (see protocol at bottom of note).    CXR Findings:  XR CHEST PORTABLE    Result Date: 2/1/2025  Impression: No acute pathology. Removal of ET and NG tube. This document has been electronically signed by: Senait Mendez MD on 02/01/2025 06:23 AM      The findings from the last RT Protocol Assessment were as follows:   History Pulmonary Disease: Smoker 15 pack years or more  Respiratory Pattern: Regular pattern and RR 12-20 bpm  Breath Sounds: Slightly diminished and/or crackles  Cough: Strong, spontaneous, non-productive  Indication for Bronchodilator Therapy:    Bronchodilator Assessment Score: 3    Aerosolized bronchodilator medication orders have been revised according to the RT Inhaler-Nebulizer Bronchodilator Protocol below.    Respiratory Therapist to perform RT Therapy Protocol Assessment initially then follow the protocol.  Repeat RT Therapy Protocol Assessment PRN for score 0-3 or on second treatment, BID, and PRN for scores above 3.    No Indications - adjust the frequency to every 6 hours PRN wheezing or bronchospasm, if no treatments needed after 48 hours then discontinue using Per Protocol order mode.     If indication present, adjust the RT bronchodilator orders based on the Bronchodilator Assessment Score as indicated below.  Use Inhaler orders unless patient has one or more of the following: on home nebulizer, not able to hold breath for 10 seconds, is not alert and oriented, cannot activate and use MDI correctly, or respiratory rate 25 breaths per minute or more, then use the equivalent nebulizer order(s) with same Frequency and PRN reasons based on the score.  If a patient is on this medication at home then do not decrease Frequency below that used

## 2025-02-03 NOTE — PROGRESS NOTES
Inpatient without Stair Climbing T-Scale Score : 32.44     Modified [place]:  Current Functional Status:  Not Applicable    ASSESSMENT:  Assessment: Patient progressing toward established goals.  Activity Tolerance:  Patient tolerance of  treatment:Good.  Plan: Current Treatment Recommendations: Strengthening, Balance training, Functional mobility training, Transfer training, Endurance training, Gait training, Stair training, Neuromuscular re-education, Home exercise program, Safety education & training, Patient/Caregiver education & training, Equipment evaluation, education, & procurement, Therapeutic activities  General Plan:  (6x O)    Education:  Learners: Patient and Significant Other  Patient Education: Plan of Care, Precautions/Restrictions, Transfers, Gait, Verbal Exercise Instruction,  - Patient Verbalized Understanding, - Patient Requires Continued Education    Goals:  Patient Goals : none stated  Short Term Goals  Time Frame for Short Term Goals: by discharge  Short Term Goal 1: bed mobility with HOB flat, no rails, mod I for increased functional ind  Short Term Goal 2: sit <>stand from various surfaces with LRD mod I for safe transfers  Short Term Goal 3: ambulate 30' with LRD Mod I for safe household distances  Short Term Goal 4: navigate 2 steps with LRD mod I for safe enter/exit of home  Long Term Goals  Time Frame for Long Term Goals : NA d/t short ELOS    Following session, patient left in safe position with all fall risk precautions in place.

## 2025-02-03 NOTE — PROGRESS NOTES
[facility]  SPEECH THERAPY  [facility] ICU STEPDOWN TELEMETRY 4K  Speech - Language - Cognitive Evaluation    Discharge Recommendations: Inpatient Rehabilitation    SLP Individual Minutes  Time In: 1047  Time Out: 1119  Minutes: 32  Timed Code Treatment Minutes: 0 Minutes       Date: 2/3/2025  Patient Name: Gabo Maldonado      CSN: [id number]   : 1940  (84 y.o.)  Gender: male   Referring Physician:   Joseline Hernández PA-C   Diagnosis: Trauma  Precautions: Fall Risk   History of Present Illness/Injury: Patient admitted to [facility] with above dx. See physician H&P for full report. ST completed CSE  recommending regular diet with thin liquids. ST recommending no f/u dysphagia interventions. ST does recommend speech/language/cognitive evaluation d/t memory concerns secondary to admission of fall.   Past Medical History:   Diagnosis Date    Accelerated hypertension 02/10/2017    Adenomatous colon polyp     Ataxic gait 02/10/2017    Blood transfusion reaction     Cervical muscle pain 2019    Episodic lightheadedness, not positional 2019    Erectile dysfunction     History of adenomatous polyp of colon, 2012    History of blood transfusion     Hyperlipidemia 2005    Hypertension     Other disorders of kidney and ureter in diseases classified elsewhere     Paroxysmal atrial fibrillation (HCC) 2025    Pituitary macroadenoma (HCC)     Pneumonia due to COVID-19 virus 10/18/2021    S/P laparoscopic cholecystectomy 2014    Testicular pain, left 2018    Tobacco dependence in remission 2012    Zoster 2009       Pain: No pain reported.    Subjective:  DENISE Cummings approved ST attempt. Patient pleasant and cooperative. Spouse Angélica present.     SOCIAL HISTORY:   Living Arrangements: Home with spouse   Work History: Retired  Driving Status: Active   Finance Management: Independent- shared task   Medication Management: Independent  ADL's:

## 2025-02-03 NOTE — PROGRESS NOTES
Orthopaedic Progress Note      SUBJECTIVE   Mr. Maldonado is post op day # 3 L VALERIA    Seen at bedside this morning  no adverse events overnight  No outbursts or concerns overnight  Pain well controlled, tolerating oral diet  Denies any numbness/paresthesia in operative extremity  To mobilize today       OBJECTIVE      Physical    VITALS:  BP (!) 131/96   Pulse 87   Temp 98.2 °F (36.8 °C) (Oral)   Resp 16   Ht 1.727 m (5' 8\")   Wt 126.4 kg (278 lb 10.6 oz)   SpO2 94%   BMI 42.37 kg/m²   I/O last 3 completed shifts:  In: 462.9 [P.O.:200; IV Piggyback:262.9]  Out: 1258 [Urine:1258]    5/10 pain  Gen: alert and oriented  Head: normorcephalic, atraumatic  Resp: unlabored, room air  Pelvis: stable  LLE prineo intact, no drainage  Sensation to light touch intact  Gastroc TA EHL intact  Distal pulses palpable, warm well perfused  Calf supple nontender to palpation       Data  CBC:   Lab Results   Component Value Date/Time    WBC 9.2 02/02/2025 05:35 AM    HGB 12.8 02/02/2025 05:35 AM     02/02/2025 05:35 AM     BMP:    Lab Results   Component Value Date/Time     02/03/2025 05:53 AM    K 3.8 02/03/2025 05:53 AM    K 5.3 10/19/2021 08:48 AM     02/03/2025 05:53 AM    CO2 26 02/03/2025 05:53 AM    BUN 27 02/03/2025 05:53 AM    CREATININE 0.8 02/03/2025 05:53 AM    CALCIUM 9.1 02/03/2025 05:53 AM    GLUCOSE 113 02/03/2025 05:53 AM     Uric Acid:  No components found for: \"URIC\"  PT/INR:    Lab Results   Component Value Date/Time    INR 1.21 01/31/2025 03:28 AM     Troponin:  No results found for: \"TROPONINI\"  Urine Culture:  No components found for: \"CURINE\"      Current Inpatient Medications    Current Facility-Administered Medications: acetaminophen (TYLENOL) tablet 1,000 mg, 1,000 mg, Oral, Q6H PRN  sodium chloride (OCEAN, BABY AYR) 0.65 % nasal spray 1 spray, 1 spray, Each Nostril, Q6H PRN  melatonin tablet 6 mg, 6 mg, Oral, Nightly  QUEtiapine (SEROQUEL) tablet 25 mg, 25 mg, Oral, Nightly

## 2025-02-03 NOTE — PROGRESS NOTES
Outpatient Medication Coverage    [facility] OP pharmacy evaluated patient cost for Eliquis and Xarelto.     Patient has not met deductible. Patient still has $250 left of deductible for the year so the following costs will reflect that.      Eliquis Initial copay: $340.21   Eliquis Co-insurance after deductible met: $90.21   Free 30 day trial is available if patient has never taken.     Xarelto Initial copay: $338.13   Xarelto Co-insurance after deductible met: $88.13   Free 30 day trial is available if patient has never taken.      Criss Don PharmD 2/3/2025 12:48 PM

## 2025-02-04 ENCOUNTER — APPOINTMENT (OUTPATIENT)
Dept: ULTRASOUND IMAGING | Age: 85
DRG: 853 | End: 2025-02-04
Payer: MEDICARE

## 2025-02-04 ENCOUNTER — APPOINTMENT (OUTPATIENT)
Dept: GENERAL RADIOLOGY | Age: 85
DRG: 853 | End: 2025-02-04
Payer: MEDICARE

## 2025-02-04 LAB
ANION GAP SERPL CALC-SCNC: 11 MEQ/L (ref 8–16)
BUN SERPL-MCNC: 19 MG/DL (ref 7–22)
CALCIUM SERPL-MCNC: 9.1 MG/DL (ref 8.5–10.5)
CHLORIDE SERPL-SCNC: 105 MEQ/L (ref 98–111)
CO2 SERPL-SCNC: 25 MEQ/L (ref 23–33)
CREAT SERPL-MCNC: 0.8 MG/DL (ref 0.4–1.2)
DEPRECATED RDW RBC AUTO: 45.4 FL (ref 35–45)
ERYTHROCYTE [DISTWIDTH] IN BLOOD BY AUTOMATED COUNT: 13.2 % (ref 11.5–14.5)
GFR SERPL CREATININE-BSD FRML MDRD: 86 ML/MIN/1.73M2
GLUCOSE SERPL-MCNC: 106 MG/DL (ref 70–108)
HCT VFR BLD AUTO: 36.7 % (ref 42–52)
HGB BLD-MCNC: 12.4 GM/DL (ref 14–18)
MAGNESIUM SERPL-MCNC: 1.9 MG/DL (ref 1.6–2.4)
MCH RBC QN AUTO: 32 PG (ref 26–33)
MCHC RBC AUTO-ENTMCNC: 33.8 GM/DL (ref 32.2–35.5)
MCV RBC AUTO: 94.8 FL (ref 80–94)
PLATELET # BLD AUTO: 122 THOU/MM3 (ref 130–400)
PMV BLD AUTO: 10.5 FL (ref 9.4–12.4)
POTASSIUM SERPL-SCNC: 4 MEQ/L (ref 3.5–5.2)
RBC # BLD AUTO: 3.87 MILL/MM3 (ref 4.7–6.1)
SODIUM SERPL-SCNC: 141 MEQ/L (ref 135–145)
WBC # BLD AUTO: 6.9 THOU/MM3 (ref 4.8–10.8)

## 2025-02-04 PROCEDURE — 6360000002 HC RX W HCPCS: Performed by: PHYSICIAN ASSISTANT

## 2025-02-04 PROCEDURE — 76705 ECHO EXAM OF ABDOMEN: CPT

## 2025-02-04 PROCEDURE — 83735 ASSAY OF MAGNESIUM: CPT

## 2025-02-04 PROCEDURE — 6370000000 HC RX 637 (ALT 250 FOR IP)

## 2025-02-04 PROCEDURE — 6370000000 HC RX 637 (ALT 250 FOR IP): Performed by: STUDENT IN AN ORGANIZED HEALTH CARE EDUCATION/TRAINING PROGRAM

## 2025-02-04 PROCEDURE — 97110 THERAPEUTIC EXERCISES: CPT

## 2025-02-04 PROCEDURE — 80048 BASIC METABOLIC PNL TOTAL CA: CPT

## 2025-02-04 PROCEDURE — 1200000003 HC TELEMETRY R&B

## 2025-02-04 PROCEDURE — 36415 COLL VENOUS BLD VENIPUNCTURE: CPT

## 2025-02-04 PROCEDURE — 94640 AIRWAY INHALATION TREATMENT: CPT

## 2025-02-04 PROCEDURE — 97530 THERAPEUTIC ACTIVITIES: CPT

## 2025-02-04 PROCEDURE — 99233 SBSQ HOSP IP/OBS HIGH 50: CPT | Performed by: STUDENT IN AN ORGANIZED HEALTH CARE EDUCATION/TRAINING PROGRAM

## 2025-02-04 PROCEDURE — 6370000000 HC RX 637 (ALT 250 FOR IP): Performed by: PHYSICIAN ASSISTANT

## 2025-02-04 PROCEDURE — 2500000003 HC RX 250 WO HCPCS: Performed by: PHYSICIAN ASSISTANT

## 2025-02-04 PROCEDURE — 94761 N-INVAS EAR/PLS OXIMETRY MLT: CPT

## 2025-02-04 PROCEDURE — 1200000000 HC SEMI PRIVATE

## 2025-02-04 PROCEDURE — 2700000000 HC OXYGEN THERAPY PER DAY

## 2025-02-04 PROCEDURE — 85027 COMPLETE CBC AUTOMATED: CPT

## 2025-02-04 PROCEDURE — 99223 1ST HOSP IP/OBS HIGH 75: CPT | Performed by: INTERNAL MEDICINE

## 2025-02-04 PROCEDURE — 71045 X-RAY EXAM CHEST 1 VIEW: CPT

## 2025-02-04 RX ADMIN — ACETAMINOPHEN 1000 MG: 500 TABLET ORAL at 08:04

## 2025-02-04 RX ADMIN — ENOXAPARIN SODIUM 30 MG: 100 INJECTION SUBCUTANEOUS at 08:00

## 2025-02-04 RX ADMIN — SODIUM CHLORIDE, PRESERVATIVE FREE 10 ML: 5 INJECTION INTRAVENOUS at 08:01

## 2025-02-04 RX ADMIN — METOPROLOL TARTRATE 50 MG: 50 TABLET, FILM COATED ORAL at 20:52

## 2025-02-04 RX ADMIN — Medication 12.5 MG: at 08:01

## 2025-02-04 RX ADMIN — Medication 2 PUFF: at 21:45

## 2025-02-04 RX ADMIN — ENOXAPARIN SODIUM 30 MG: 100 INJECTION SUBCUTANEOUS at 20:45

## 2025-02-04 RX ADMIN — ACETAMINOPHEN 1000 MG: 500 TABLET ORAL at 01:41

## 2025-02-04 RX ADMIN — ACETAMINOPHEN 1000 MG: 500 TABLET ORAL at 20:45

## 2025-02-04 RX ADMIN — Medication 6 MG: at 20:45

## 2025-02-04 RX ADMIN — SODIUM CHLORIDE, PRESERVATIVE FREE 10 ML: 5 INJECTION INTRAVENOUS at 21:54

## 2025-02-04 RX ADMIN — CHLORHEXIDINE GLUCONATE 15 ML: 1.2 RINSE ORAL at 21:06

## 2025-02-04 RX ADMIN — CAPSAICIN: 0.25 CREAM TOPICAL at 08:00

## 2025-02-04 RX ADMIN — Medication 2 PUFF: at 10:13

## 2025-02-04 RX ADMIN — TIOTROPIUM BROMIDE INHALATION SPRAY 2 PUFF: 3.12 SPRAY, METERED RESPIRATORY (INHALATION) at 10:13

## 2025-02-04 RX ADMIN — SENNOSIDES 17.2 MG: 8.6 TABLET, COATED ORAL at 20:45

## 2025-02-04 RX ADMIN — CAPSAICIN: 0.25 CREAM TOPICAL at 20:45

## 2025-02-04 RX ADMIN — CHLORHEXIDINE GLUCONATE 15 ML: 1.2 RINSE ORAL at 08:01

## 2025-02-04 ASSESSMENT — PAIN SCALES - GENERAL
PAINLEVEL_OUTOF10: 2
PAINLEVEL_OUTOF10: 5
PAINLEVEL_OUTOF10: 4
PAINLEVEL_OUTOF10: 3
PAINLEVEL_OUTOF10: 3
PAINLEVEL_OUTOF10: 4

## 2025-02-04 ASSESSMENT — PAIN DESCRIPTION - LOCATION
LOCATION: HIP

## 2025-02-04 ASSESSMENT — PAIN DESCRIPTION - ORIENTATION
ORIENTATION: LEFT

## 2025-02-04 ASSESSMENT — PAIN DESCRIPTION - DESCRIPTORS
DESCRIPTORS: ACHING;TENDER
DESCRIPTORS: ACHING
DESCRIPTORS: ACHING

## 2025-02-04 ASSESSMENT — PAIN DESCRIPTION - ONSET
ONSET: ON-GOING
ONSET: ON-GOING

## 2025-02-04 ASSESSMENT — PAIN DESCRIPTION - PAIN TYPE
TYPE: SURGICAL PAIN

## 2025-02-04 ASSESSMENT — PAIN DESCRIPTION - FREQUENCY
FREQUENCY: CONTINUOUS
FREQUENCY: CONTINUOUS

## 2025-02-04 ASSESSMENT — PAIN - FUNCTIONAL ASSESSMENT
PAIN_FUNCTIONAL_ASSESSMENT: ACTIVITIES ARE NOT PREVENTED
PAIN_FUNCTIONAL_ASSESSMENT: ACTIVITIES ARE NOT PREVENTED

## 2025-02-04 NOTE — PROGRESS NOTES
[facility]  OCCUPATIONAL THERAPY MISSED TREATMENT NOTE  [facility] 7K  7K-28/028-A      Date: 2025  Patient Name: Gabo Maldonado        CSN: [id number]   : 1940  (84 y.o.)  Gender: male   Referring Practitioner: Leonor Hampton PA-C            REASON FOR MISSED TREATMENT: Patient Off Floor for Testing ultrasound, will attempt next available time.

## 2025-02-04 NOTE — PROGRESS NOTES
**OR** ondansetron (ZOFRAN) injection 4 mg, 4 mg, IntraVENous, Q6H PRN  polyethylene glycol (GLYCOLAX) packet 17 g, 17 g, Oral, Daily  0.9 % sodium chloride infusion, , IntraVENous, PRN  naloxone (NARCAN) injection 0.4 mg, 0.4 mg, IntraVENous, PRN        PLAN    Mr. Maldonado is post op day #4 L VALERIA    WBAT LLE  PT OT   Eliquis discussion with patient and family by hospitalist, will continue with lovenox in interim  Hold bowel regimen   Pulmonary hygiene  Reinforce dressing as needed, do not remove prineo mesh dressing  AM labs pending  Rocephin complete    SNF, medically stable, IPR denied

## 2025-02-04 NOTE — DISCHARGE INSTRUCTIONS
DR. ESTRADA DISCHARGE INSTRUCTIONS  Dr. Isaac Estrada MD        ACTIVITY / WEIGHTBEARING  INSTRUCTIONS:  Weightbearing as tolerated surgical extremity.   PT/OT      DIET:  Increase Fluid/Water intake, eat foods high in fiber, fruits and vegetables to help to prevent constipation.     WOUND/DRESSING INSTRUCTIONS:  Always ensure you wash your hands before and after caring for your wound/dressing.  Keep your dressing clean and dry. Change dressing as needed.  Can wash around incision.      If prineo (mesh tape dressing) in place, this may be removed after 3 weeks.   You may shower with prineo dressing if no active drainage coming from incision.   Do not let shower water directly hit the incision. Dry completely.      Do not place antibiotic ointment, lotion, creams on surgical incision.  Smoking impairs adequate wound healing.  If smoking, consider quitting.  May apply ice to surgical incision to help to prevent swelling.     MEDICATION INSTRUCTIONS:  Take pain medication as prescribed.    See orders regarding resuming your home medications and any new medications.  Continue blood thinner if ordered by your physician.      FOLLOW-UP CARE:  If a follow-up appointment was not made for you at discharge, call [phone] ([place] office) or [phone] ([place] office) to schedule an appointment for 6-8 weeks.     Call Dr Estrada's office with any concerns.                 Signs of infection such as fever, chills, redness, pus, or bad smelling discharge from incision site.                 Excessive bleeding, swelling, increasing pain, or discharge around incision site.                 The stitches or staples come apart at the incision site.                 Cough shortness of breath, or chest pain. Severe nausea or vomiting.                 Pain that you cannot control with the medications you have been given or  pain becomes worse.                 Numbness, tingling, or loss of feeling in your leg, knee, or foot.

## 2025-02-04 NOTE — CONSULTS
08/24/2014 09:45 PM    WBCUA 2 08/24/2014 09:45 PM    RBCUA 3 08/24/2014 09:45 PM    YEAST NONE SEEN 08/24/2014 09:45 PM    BACTERIA NONE 08/24/2014 09:45 PM    LEUKOCYTESUR NEGATIVE 01/30/2025 01:00 AM    UROBILINOGEN 0.2 01/30/2025 01:00 AM    BILIRUBINUR NEGATIVE 01/30/2025 01:00 AM    BLOODU NEGATIVE 01/30/2025 01:00 AM    GLUCOSEU NEGATIVE 01/30/2025 01:00 AM         Physical Exam:  Vitals:    02/04/25 1245   BP: 102/82   Pulse: 92   Resp: 18   Temp:    SpO2: 94%      Intake/Output Summary (Last 24 hours) at 2/4/2025 1431  Last data filed at 2/4/2025 1353  Gross per 24 hour   Intake 1190 ml   Output 1360 ml   Net -170 ml      General:  No acute distress  Neck: Supple, no JVD, no carotid bruits  Heart: Regular rhythm normal S1 and S2, no rubs, murmurs or gallops  Lungs: clear to ascultation no rales, wheezes, or rhonchi  Abdomen: positive bowel sounds, soft, non-tender, non-distended, no bruits, no masses  Extremities:no clubbing, cyanosis or edema  Neurologic: alert and oriented x 3, cranial nerves 2-12 grossly intact, motor and sensory intact, moving all extremities  Skin: No rashes  Psych: AO x 3, no depression/vamsi, no pressured speech, normal affect  Lymph: No obvious LAD    Med, labs, EKG , echo and imaging studies reviewed    Assessment:  New-onset atrial fibrillation: S/p total hip arthroplasty on 1/31. [id number] 3. 2/1 EKG showed atrial fibrillation; HR 94. Patient reports on having atrial fibrillation once before after a gallbladder procedure. Not on any anticoagulation at home. Currently on lopressor 50 mg PO BID for HTN. Patient reports feeling no symptoms. Patient is refusing anticoagulation at this time; states it is too expensive and he wouldn't be interested either way. Patient states that he understands the risks. 2/4 HR currently 92.    Hypertension: Currently on lopressor 50 mg PO BID and lisinopril 20 mg PO BID; lisinopril currently held by primary team.   Hyperlipidemia: Currently not on

## 2025-02-04 NOTE — PROGRESS NOTES
[facility]  INPATIENT PHYSICAL THERAPY  DAILY NOTE  [facility] 7K - 7K-28/028-A      Discharge Recommendations: Subacute/Skilled Nursing Facility  Equipment Recommendations: No               Time In: 926  Time Out: 957  Timed Code Treatment Minutes: 31 Minutes  Minutes: 31          Date: 2025  Patient Name: Gabo Maldonado,  Gender:  male        MRN: [id number]  : 1940  (84 y.o.)     Referring Practitioner: Leonor Hampton PA-C  Diagnosis: Trauma  Additional Pertinent Hx: 84-year-old male with a past medical history of hypertension, erectile dysfunction, hyperlipidemia, pituitary macroadenoma, and COPD originally presented to [facility] after a fall down 2 concrete steps resulting in left-sided hip pain.  The patient was found to have a left femoral neck fracture for which orthopedics was consulted with plans for operative management the following day.  The patient developed strokelike symptoms that included a right-sided facial droop and right-sided ataxia progressing to the need for a stroke alert to be called.  Patient fell outside the window for TNK, and had no evidence of LVO, and improved with Narcan, so no intervention deemed necessary by neurology.  On the floor, the patient developed lethargy, and shown to be hypercapnic, so he was placed on BiPAP.  Despite this, including changes to the settings, the patient did become more hypercapnic, so he transferred to ICU for hypercapnic respiratory failure. LEFT HIP TOTAL ARTHROPLASTY  By Dr Estrada     Prior Level of Function:  Lives With: Spouse  Type of Home: House  Home Layout: Two level, Bed/Bath upstairs, 1/2 bath on main level  Home Access: Stairs to enter with rails  Entrance Stairs - Number of Steps: 4  Entrance Stairs - Rails: Right  Home Equipment: Walker - Rolling   Bathroom Shower/Tub: Tub/Shower unit  Bathroom Equipment: Grab bars in shower    Prior Level of Assist for ADLs: Independent  Prior Level of Assist

## 2025-02-04 NOTE — DISCHARGE INSTR - COC
Continuity of Care Form    Patient Name: Gabo Maldonado   :  1940  MRN:  [id number]    Admit date:  2025  Discharge date:  2/10/25    Code Status Order: Full Code   Advance Directives:   Advance Care Flowsheet Documentation             Admitting Physician:  Isaac Estrada MD  PCP: Pedro Escobedo DO    Discharging Nurse: Benita Moran RN BSN  Discharging Hospital Unit/Room#: 7K-28/028-A  Discharging Unit Phone Number: [phone]    Emergency Contact:   Extended Emergency Contact Information  Primary Emergency Contact: Shayy Garrett  Home Phone: [phone]  Mobile Phone: [phone]  Relation: Child   needed? No  Secondary Emergency Contact: Iam Maldonado  Mobile Phone: [phone]  Relation: Child    Past Surgical History:  Past Surgical History:   Procedure Laterality Date    ABDOMEN SURGERY  unsure    CARDIAC CATHETERIZATION      CHOLECYSTECTOMY      COLONOSCOPY      DENTAL SURGERY      at age 20 yr    HERNIA REPAIR  2003    right inguinal    TONSILLECTOMY  1946    TONSILLECTOMY AND ADENOIDECTOMY      TOOTH EXTRACTION  unsure       Immunization History:   There is no immunization history for the selected administration types on file for this patient.    Active Problems:  Patient Active Problem List   Diagnosis Code    Morbidly obese E66.01    Primary hypertension I10    Hyperlipemia E78.5    CAD (coronary artery,  I25.10    Pituitary adenoma (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) S72.002A    Delirium R41.0    Acute respiratory failure with hypoxia J96.01    Sepsis with acute hypoxic respiratory failure without septic shock ([facility]) A41.9, R65.20, J96.01    Paroxysmal atrial fibrillation ([facility]) I48.0    PETRA (acute kidney injury) ([facility]) N17.9

## 2025-02-04 NOTE — PROGRESS NOTES
SCDs / [] Already on Systemic Anticoagulation / [] None     Expected discharge date: Unknown  Disposition: Home     Code status: Full Code     ===================================================================    Chief Complaint: S/p left femoral fracture  Subjective (past 24 hours): Patient was seen this morning in his room, he states that he has not had any pain after his surgery.  Has no acute complaints at this time.  States that he is not requiring pain medication besides Tylenol.    Patient was explained the benefits/risks of starting anticoagulation therapy in the setting of atrial fibrillation.  Patient has elected to not begin anticoagulation due to increased cost of Eliquis.  Patient stated that they also were not interested in starting warfarin.    HPI / Hospital Course:  Per initial HPI:\"This is an 84-year-old male who presented to [facility] ED after a fall without loss of consciousness or head trauma on 01/28/2025. Patient was seen and evaluated by trauma surgery and was found to have a nondisplaced left femoral neck fracture. The patient was admitted by orthopedic surgery with plan for left hemiarthroplasty. Analgesia regimen was initiated. Upon admission, the patient was noted to have a change in mentation. Secondary to concerns for CVA, a stroke alert was called. Imaging of the patient's head and neck showed no evidence of acute pathology. Additional imaging of the patient's chest showed no evidence of pulmonary embolism. The patient was started on continuous BiPAP and received naloxone. The patient remained persistently hypercapnic with BiPAP trial and was transferred to the intensive care unit after consent was obtained from the patient's family for intubation and mechanical ventilation. \"    Medications:    Infusion Medications    dextrose      sodium chloride      sodium chloride      Scheduled Medications    enoxaparin  30 mg SubCUTAneous BID    naloxegol  12.5 mg Oral QAM AC    senna  2 tablet Oral

## 2025-02-05 LAB
ANION GAP SERPL CALC-SCNC: 6 MEQ/L (ref 8–16)
BUN SERPL-MCNC: 17 MG/DL (ref 7–22)
CALCIUM SERPL-MCNC: 9.1 MG/DL (ref 8.5–10.5)
CHLORIDE SERPL-SCNC: 101 MEQ/L (ref 98–111)
CO2 SERPL-SCNC: 30 MEQ/L (ref 23–33)
CREAT SERPL-MCNC: 0.7 MG/DL (ref 0.4–1.2)
DEPRECATED RDW RBC AUTO: 45.4 FL (ref 35–45)
ERYTHROCYTE [DISTWIDTH] IN BLOOD BY AUTOMATED COUNT: 13.5 % (ref 11.5–14.5)
GFR SERPL CREATININE-BSD FRML MDRD: 90 ML/MIN/1.73M2
GLUCOSE SERPL-MCNC: 112 MG/DL (ref 70–108)
HCT VFR BLD AUTO: 36.6 % (ref 42–52)
HGB BLD-MCNC: 12.4 GM/DL (ref 14–18)
MAGNESIUM SERPL-MCNC: 1.9 MG/DL (ref 1.6–2.4)
MCH RBC QN AUTO: 31.9 PG (ref 26–33)
MCHC RBC AUTO-ENTMCNC: 33.9 GM/DL (ref 32.2–35.5)
MCV RBC AUTO: 94.1 FL (ref 80–94)
PLATELET # BLD AUTO: 146 THOU/MM3 (ref 130–400)
PMV BLD AUTO: 10.2 FL (ref 9.4–12.4)
POTASSIUM SERPL-SCNC: 4.1 MEQ/L (ref 3.5–5.2)
RBC # BLD AUTO: 3.89 MILL/MM3 (ref 4.7–6.1)
SODIUM SERPL-SCNC: 137 MEQ/L (ref 135–145)
WBC # BLD AUTO: 7.8 THOU/MM3 (ref 4.8–10.8)

## 2025-02-05 PROCEDURE — 6370000000 HC RX 637 (ALT 250 FOR IP): Performed by: PHYSICIAN ASSISTANT

## 2025-02-05 PROCEDURE — 99232 SBSQ HOSP IP/OBS MODERATE 35: CPT | Performed by: NURSE PRACTITIONER

## 2025-02-05 PROCEDURE — 2500000003 HC RX 250 WO HCPCS: Performed by: PHYSICIAN ASSISTANT

## 2025-02-05 PROCEDURE — 1200000003 HC TELEMETRY R&B

## 2025-02-05 PROCEDURE — 99232 SBSQ HOSP IP/OBS MODERATE 35: CPT | Performed by: PHYSICIAN ASSISTANT

## 2025-02-05 PROCEDURE — 97535 SELF CARE MNGMENT TRAINING: CPT

## 2025-02-05 PROCEDURE — 6370000000 HC RX 637 (ALT 250 FOR IP): Performed by: STUDENT IN AN ORGANIZED HEALTH CARE EDUCATION/TRAINING PROGRAM

## 2025-02-05 PROCEDURE — 36415 COLL VENOUS BLD VENIPUNCTURE: CPT

## 2025-02-05 PROCEDURE — 97116 GAIT TRAINING THERAPY: CPT

## 2025-02-05 PROCEDURE — 94761 N-INVAS EAR/PLS OXIMETRY MLT: CPT

## 2025-02-05 PROCEDURE — 97110 THERAPEUTIC EXERCISES: CPT

## 2025-02-05 PROCEDURE — 80048 BASIC METABOLIC PNL TOTAL CA: CPT

## 2025-02-05 PROCEDURE — 1200000000 HC SEMI PRIVATE

## 2025-02-05 PROCEDURE — 6370000000 HC RX 637 (ALT 250 FOR IP)

## 2025-02-05 PROCEDURE — 94640 AIRWAY INHALATION TREATMENT: CPT

## 2025-02-05 PROCEDURE — 2700000000 HC OXYGEN THERAPY PER DAY

## 2025-02-05 PROCEDURE — 83735 ASSAY OF MAGNESIUM: CPT

## 2025-02-05 PROCEDURE — 85027 COMPLETE CBC AUTOMATED: CPT

## 2025-02-05 PROCEDURE — 6360000002 HC RX W HCPCS: Performed by: PHYSICIAN ASSISTANT

## 2025-02-05 RX ORDER — ASPIRIN 81 MG/1
81 TABLET ORAL 2 TIMES DAILY
Qty: 42 TABLET | Refills: 0 | Status: SHIPPED | OUTPATIENT
Start: 2025-02-05 | End: 2025-02-26

## 2025-02-05 RX ORDER — OXYCODONE HYDROCHLORIDE 5 MG/1
2.5 TABLET ORAL EVERY 6 HOURS PRN
Qty: 14 TABLET | Refills: 0 | Status: SHIPPED | OUTPATIENT
Start: 2025-02-05 | End: 2025-02-12

## 2025-02-05 RX ORDER — POLYETHYLENE GLYCOL 3350 17 G/17G
17 POWDER, FOR SOLUTION ORAL DAILY
Qty: 10 PACKET | Refills: 0 | Status: SHIPPED | OUTPATIENT
Start: 2025-02-05 | End: 2025-02-15

## 2025-02-05 RX ADMIN — POLYETHYLENE GLYCOL 3350 17 G: 17 POWDER, FOR SOLUTION ORAL at 09:02

## 2025-02-05 RX ADMIN — CAPSAICIN: 0.25 CREAM TOPICAL at 21:16

## 2025-02-05 RX ADMIN — Medication 2 PUFF: at 21:36

## 2025-02-05 RX ADMIN — SENNOSIDES 17.2 MG: 8.6 TABLET, COATED ORAL at 21:15

## 2025-02-05 RX ADMIN — ACETAMINOPHEN 1000 MG: 500 TABLET ORAL at 06:11

## 2025-02-05 RX ADMIN — METOPROLOL TARTRATE 50 MG: 50 TABLET, FILM COATED ORAL at 21:15

## 2025-02-05 RX ADMIN — ACETAMINOPHEN 1000 MG: 500 TABLET ORAL at 11:56

## 2025-02-05 RX ADMIN — Medication 12.5 MG: at 06:11

## 2025-02-05 RX ADMIN — ENOXAPARIN SODIUM 30 MG: 100 INJECTION SUBCUTANEOUS at 21:16

## 2025-02-05 RX ADMIN — CHLORHEXIDINE GLUCONATE 15 ML: 1.2 RINSE ORAL at 21:16

## 2025-02-05 RX ADMIN — CHLORHEXIDINE GLUCONATE 15 ML: 1.2 RINSE ORAL at 09:11

## 2025-02-05 RX ADMIN — CAPSAICIN: 0.25 CREAM TOPICAL at 09:01

## 2025-02-05 RX ADMIN — SODIUM CHLORIDE, PRESERVATIVE FREE 10 ML: 5 INJECTION INTRAVENOUS at 21:15

## 2025-02-05 RX ADMIN — SODIUM CHLORIDE, PRESERVATIVE FREE 10 ML: 5 INJECTION INTRAVENOUS at 09:02

## 2025-02-05 RX ADMIN — ENOXAPARIN SODIUM 30 MG: 100 INJECTION SUBCUTANEOUS at 09:02

## 2025-02-05 RX ADMIN — METOPROLOL TARTRATE 50 MG: 50 TABLET, FILM COATED ORAL at 09:02

## 2025-02-05 RX ADMIN — Medication 6 MG: at 21:15

## 2025-02-05 RX ADMIN — ACETAMINOPHEN 1000 MG: 500 TABLET ORAL at 21:16

## 2025-02-05 ASSESSMENT — PAIN - FUNCTIONAL ASSESSMENT
PAIN_FUNCTIONAL_ASSESSMENT: ACTIVITIES ARE NOT PREVENTED
PAIN_FUNCTIONAL_ASSESSMENT: ACTIVITIES ARE NOT PREVENTED
PAIN_FUNCTIONAL_ASSESSMENT: PREVENTS OR INTERFERES SOME ACTIVE ACTIVITIES AND ADLS
PAIN_FUNCTIONAL_ASSESSMENT: PREVENTS OR INTERFERES SOME ACTIVE ACTIVITIES AND ADLS

## 2025-02-05 ASSESSMENT — PAIN DESCRIPTION - ORIENTATION
ORIENTATION: LEFT

## 2025-02-05 ASSESSMENT — PAIN DESCRIPTION - LOCATION
LOCATION: LEG
LOCATION: KNEE
LOCATION: HIP
LOCATION: HIP

## 2025-02-05 ASSESSMENT — PAIN DESCRIPTION - DESCRIPTORS
DESCRIPTORS: TENDER
DESCRIPTORS: TENDER
DESCRIPTORS: ACHING
DESCRIPTORS: ACHING

## 2025-02-05 ASSESSMENT — PAIN DESCRIPTION - PAIN TYPE
TYPE: SURGICAL PAIN
TYPE: SURGICAL PAIN

## 2025-02-05 ASSESSMENT — PAIN SCALES - GENERAL
PAINLEVEL_OUTOF10: 3
PAINLEVEL_OUTOF10: 9
PAINLEVEL_OUTOF10: 6
PAINLEVEL_OUTOF10: 0
PAINLEVEL_OUTOF10: 9
PAINLEVEL_OUTOF10: 2

## 2025-02-05 ASSESSMENT — PAIN DESCRIPTION - FREQUENCY
FREQUENCY: INTERMITTENT
FREQUENCY: INTERMITTENT

## 2025-02-05 ASSESSMENT — PAIN DESCRIPTION - ONSET
ONSET: ON-GOING
ONSET: ON-GOING

## 2025-02-05 NOTE — PROGRESS NOTES
Orthopaedic Progress Note      SUBJECTIVE   Mr. Maldonado is post op day # 5 L VALERIA    Seen at bedside this morning  no adverse events overnight  Slept wel;  +BM  Pain well controlled, tolerating oral diet  Denies any numbness/paresthesia in operative extremity  Good efforts PT OT      OBJECTIVE      Physical    VITALS:  /78   Pulse 88   Temp 97.9 °F (36.6 °C) (Oral)   Resp 18   Ht 1.727 m (5' 8\")   Wt 121.9 kg (268 lb 11.9 oz)   SpO2 95%   BMI 40.86 kg/m²   I/O last 3 completed shifts:  In: 1910 [P.O.:1900; I.V.:10]  Out: 1670 [Urine:1670]    3/10 pain  Gen: alert and oriented  Head: normorcephalic, atraumatic  Resp: unlabored, room air  Pelvis: stable  LLE prineo intact, no drainage  Sensation to light touch intact  Gastroc TA EHL intact  Distal pulses palpable, warm well perfused  Calf supple nontender to palpation       Data  CBC:   Lab Results   Component Value Date/Time    WBC 6.9 02/04/2025 07:12 AM    HGB 12.4 02/04/2025 07:12 AM     02/04/2025 07:12 AM     BMP:    Lab Results   Component Value Date/Time     02/04/2025 07:12 AM    K 4.0 02/04/2025 07:12 AM    K 5.3 10/19/2021 08:48 AM     02/04/2025 07:12 AM    CO2 25 02/04/2025 07:12 AM    BUN 19 02/04/2025 07:12 AM    CREATININE 0.8 02/04/2025 07:12 AM    CALCIUM 9.1 02/04/2025 07:12 AM    GLUCOSE 106 02/04/2025 07:12 AM     Uric Acid:  No components found for: \"URIC\"  PT/INR:    Lab Results   Component Value Date/Time    INR 1.21 01/31/2025 03:28 AM     Troponin:  No results found for: \"TROPONINI\"  Urine Culture:  No components found for: \"CURINE\"      Current Inpatient Medications    Current Facility-Administered Medications: enoxaparin Sodium (LOVENOX) injection 30 mg, 30 mg, SubCUTAneous, BID  naloxegol (MOVANTIK) tablet 12.5 mg, 12.5 mg, Oral, QAM AC  senna (SENOKOT) tablet 17.2 mg, 2 tablet, Oral, Nightly  capsaicin (ZOSTRIX) 0.025 % cream, , Topical, BID  acetaminophen (TYLENOL) tablet 1,000 mg, 1,000 mg, Oral, Q6H

## 2025-02-05 NOTE — PROGRESS NOTES
ADDITIONAL ACTIVITIES:  Precautions reviewed Pt. Unable to recall.     Functional Outcome Measures:   AM-PAC Inpatient Daily Activity Raw Score: 17     Modified [place]:  Current Functional Status:  Not Applicable    ASSESSMENT:     Activity Tolerance:  Patient tolerance of  treatment: Good treatment tolerance      Plan: Times Per Week: 6x  Current Treatment Recommendations: Strengthening, ROM, Balance training, Functional mobility training, Cognitive reorientation, Safety education & training, Patient/Caregiver education & training, Equipment evaluation, education, & procurement, Positioning, Self-Care / ADL, Home management training, Cognitive/Perceptual training    Education:  Learners: Patient  ADL's, Precautions, Importance of Increasing Activity, Fall Prevention, and Assistive Device Safety    Goals  Short Term Goals  Time Frame for Short Term Goals: by discharge  Short Term Goal 1: Pt will complete ADL transfer w/ CGA via LRD.  Short Term Goal 2: Pt will complete LE dressing w/ Mod assist and LHAD PRN.  Short Term Goal 3: Pt will complete dynamic standing task >5 min for improved [place]ide grooming tolerance.  Short Term Goal 4: Pt will complete functional mobility at household distances with 0-2 cues for safety awareness and technique.  Long Term Goals  Time Frame for Long Term Goals : No LTGs set 2/2 short ELOS    Following session, patient left in safe position with all fall risk precautions in place.

## 2025-02-05 NOTE — PROGRESS NOTES
Cardiology Progress Note      Patient:  Gabo Maldonado  YOB: 1940  MRN: [id number]   Acct: [id number]  Admit Date:  1/28/2025  Primary Cardiologist:  none    Note per dr ruiz \"CHIEF COMPLAINT: Fall-mechanical     Reason of consultation-  Atrial fib new onset        HPI: This is a pleasant 84 y.o. male presents with a PMHx of hypertension, hyperlipidemia, pituitary macroadenoma and COPD who presented to [facility] 1/28/2025 secondary to fall resulting in a left femoral neck fracture, s/p left total hip arthroplasty 1/30. After following patient developed strokelike symptoms including right-sided facial droop, right-sided ataxia ultimately ending and code stroke being called. Patient outside of window for TNK, imaging revealing no large vessel occlusion. Once admitted to the floors patient found to be hypercapnic placed on BiPAP, however patient had worsening hypercapnia resulting in him ultimately being transferred to ICU for intubation. Patient was successfully extubated and transferred out of the ICU 2/1.\"    Subjective (Events in last 24 hours): pt awake and alert.  NAD. No cp or sob. No edema or orthopnea. No complaints  On 1 l/min O2      Objective:   /78   Pulse 88   Temp 97.9 °F (36.6 °C) (Oral)   Resp 18   Ht 1.727 m (5' 8\")   Wt 121.9 kg (268 lb 11.9 oz)   SpO2 95%   BMI 40.86 kg/m²        TELEMETRY: not on tele - started just now, afib cvr 80    Physical Exam:  General Appearance: alert and oriented to person, place and time, in no acute distress  Cardiovascular: irregularly irregular  Pulmonary/Chest: clear to auscultation bilaterally- no wheezes, rales or rhonchi, normal air movement, no respiratory distress  Abdomen: soft, non-tender, non-distended, normal bowel sounds, no masses Extremities: no cyanosis, clubbing or edema, pulse   Skin: warm and dry, no rash or erythema  Head: normocephalic and atraumatic  Eyes: pupils equal, round, and reactive to light  Neck: supple and

## 2025-02-05 NOTE — PROGRESS NOTES
Hospitalist Progress Note    Patient:  Gabo Maldonado      Unit/Bed:7K-28/028-A    YOB: 1940    MRN: [id number]       Acct: [id number]     PCP: Pedro Escobedo DO    Date of Admission: 1/28/2025    Assessment/Plan:    1 left femur neck fracture patient is s/p total hip arthroplasty   Care and management per primary service    2.New onset A-fib: [id number] 3.  Patient who is not on anticoagulation at home.  Per record anticoagulation was initially held due to recent surgery,Patient was concerned about cost of anticoagulation.  Per record benefits/risks of anticoagulation were explained to the patient, and questions were answered and patient understands the risk.  Patient is reported to have declined to not begin anticoagulation after discharge due to concerns about cost.    3.Sepsis secondary to Moraxella pneumonia-improving: Per record  was formally in septic shock patient noted to have right sided consolidation seen on chest x-ray.  Patient completed course of antibiotics.     4.  Diastolic heart failure with preserved ejection fraction recent Echo 1/30/2025 showed EF 60 to 65% with diastolic dysfunction present.   Monitor daily weight, I's and O's  Continue metoprolol    5.  Coronary artery disease  Patient denies anginal symptoms  Continue metoprolol    6.  Essential hypertension  Continue metoprolol  Lisinopril currently on hold due to marginal BP  Continue to monitor BP    7.  PETRA resolved    8.  Acute on chronic hypoxic respiratory failure with underlying COPD exacerbation resolved  Continue home meds    9.  Obesity BMI 40.86  Encourage healthy food choices      Anticipated Discharge in : Per primary service    Active Hospital Problems    Diagnosis Date Noted    Left displaced femoral neck fracture (HCC) [S72.002A] 02/02/2025    Delirium [R41.0] 02/02/2025    Acute respiratory failure with hypoxia [J96.01] 02/02/2025    Sepsis with acute hypoxic respiratory failure without septic

## 2025-02-05 NOTE — PROGRESS NOTES
[facility]  INPATIENT PHYSICAL THERAPY  DAILY NOTE  [facility] ORTHOPEDICS 7K - 7K-28/028-A      Discharge Recommendations: Inpatient Rehabilitation  Equipment Recommendations: No               Time In: 09  Time Out: 958  Timed Code Treatment Minutes: 34 Minutes  Minutes: 34          Date: 2025  Patient Name: Gabo Maldonado,  Gender:  male        MRN: [id number]  : 1940  (84 y.o.)     Referring Practitioner: Leonor Hampton PA-C  Diagnosis: Trauma  Additional Pertinent Hx: 84-year-old male with a past medical history of hypertension, erectile dysfunction, hyperlipidemia, pituitary macroadenoma, and COPD originally presented to [facility] after a fall down 2 concrete steps resulting in left-sided hip pain.  The patient was found to have a left femoral neck fracture for which orthopedics was consulted with plans for operative management the following day.  The patient developed strokelike symptoms that included a right-sided facial droop and right-sided ataxia progressing to the need for a stroke alert to be called.  Patient fell outside the window for TNK, and had no evidence of LVO, and improved with Narcan, so no intervention deemed necessary by neurology.  On the floor, the patient developed lethargy, and shown to be hypercapnic, so he was placed on BiPAP.  Despite this, including changes to the settings, the patient did become more hypercapnic, so he transferred to ICU for hypercapnic respiratory failure. LEFT HIP TOTAL ARTHROPLASTY  By Dr Estrada     Prior Level of Function:  Lives With: Spouse  Type of Home: House  Home Layout: Two level, Bed/Bath upstairs, 1/2 bath on main level  Home Access: Stairs to enter with rails  Entrance Stairs - Number of Steps: 4  Entrance Stairs - Rails: Right  Home Equipment: Walker - Rolling   Bathroom Shower/Tub: Tub/Shower unit  Bathroom Equipment: Grab bars in shower    Prior Level of Assist for ADLs: Independent  Prior Level of Assist for

## 2025-02-06 LAB
ANION GAP SERPL CALC-SCNC: 15 MEQ/L (ref 8–16)
BUN SERPL-MCNC: 18 MG/DL (ref 7–22)
CALCIUM SERPL-MCNC: 9.2 MG/DL (ref 8.5–10.5)
CHLORIDE SERPL-SCNC: 98 MEQ/L (ref 98–111)
CO2 SERPL-SCNC: 24 MEQ/L (ref 23–33)
CREAT SERPL-MCNC: 0.8 MG/DL (ref 0.4–1.2)
DEPRECATED RDW RBC AUTO: 46.4 FL (ref 35–45)
EKG Q-T INTERVAL: 378 MS
EKG QRS DURATION: 122 MS
EKG QTC CALCULATION (BAZETT): 416 MS
EKG R AXIS: -30 DEGREES
EKG T AXIS: 66 DEGREES
EKG VENTRICULAR RATE: 73 BPM
ERYTHROCYTE [DISTWIDTH] IN BLOOD BY AUTOMATED COUNT: 13.6 % (ref 11.5–14.5)
GFR SERPL CREATININE-BSD FRML MDRD: 86 ML/MIN/1.73M2
GLUCOSE SERPL-MCNC: 109 MG/DL (ref 70–108)
HCT VFR BLD AUTO: 36.9 % (ref 42–52)
HGB BLD-MCNC: 12.4 GM/DL (ref 14–18)
MAGNESIUM SERPL-MCNC: 2.1 MG/DL (ref 1.6–2.4)
MCH RBC QN AUTO: 32 PG (ref 26–33)
MCHC RBC AUTO-ENTMCNC: 33.6 GM/DL (ref 32.2–35.5)
MCV RBC AUTO: 95.1 FL (ref 80–94)
PLATELET # BLD AUTO: 189 THOU/MM3 (ref 130–400)
PMV BLD AUTO: 10.5 FL (ref 9.4–12.4)
POTASSIUM SERPL-SCNC: 4.5 MEQ/L (ref 3.5–5.2)
RBC # BLD AUTO: 3.88 MILL/MM3 (ref 4.7–6.1)
SODIUM SERPL-SCNC: 137 MEQ/L (ref 135–145)
WBC # BLD AUTO: 7.5 THOU/MM3 (ref 4.8–10.8)

## 2025-02-06 PROCEDURE — 93005 ELECTROCARDIOGRAM TRACING: CPT | Performed by: NURSE PRACTITIONER

## 2025-02-06 PROCEDURE — 97116 GAIT TRAINING THERAPY: CPT

## 2025-02-06 PROCEDURE — 85027 COMPLETE CBC AUTOMATED: CPT

## 2025-02-06 PROCEDURE — 6370000000 HC RX 637 (ALT 250 FOR IP): Performed by: STUDENT IN AN ORGANIZED HEALTH CARE EDUCATION/TRAINING PROGRAM

## 2025-02-06 PROCEDURE — 6370000000 HC RX 637 (ALT 250 FOR IP)

## 2025-02-06 PROCEDURE — 97535 SELF CARE MNGMENT TRAINING: CPT

## 2025-02-06 PROCEDURE — 94640 AIRWAY INHALATION TREATMENT: CPT

## 2025-02-06 PROCEDURE — 83735 ASSAY OF MAGNESIUM: CPT

## 2025-02-06 PROCEDURE — 80048 BASIC METABOLIC PNL TOTAL CA: CPT

## 2025-02-06 PROCEDURE — 94761 N-INVAS EAR/PLS OXIMETRY MLT: CPT

## 2025-02-06 PROCEDURE — 93010 ELECTROCARDIOGRAM REPORT: CPT | Performed by: NUCLEAR MEDICINE

## 2025-02-06 PROCEDURE — 36415 COLL VENOUS BLD VENIPUNCTURE: CPT

## 2025-02-06 PROCEDURE — 6360000002 HC RX W HCPCS: Performed by: PHYSICIAN ASSISTANT

## 2025-02-06 PROCEDURE — 97530 THERAPEUTIC ACTIVITIES: CPT

## 2025-02-06 PROCEDURE — 6370000000 HC RX 637 (ALT 250 FOR IP): Performed by: PHYSICIAN ASSISTANT

## 2025-02-06 PROCEDURE — 99232 SBSQ HOSP IP/OBS MODERATE 35: CPT | Performed by: NURSE PRACTITIONER

## 2025-02-06 PROCEDURE — 2500000003 HC RX 250 WO HCPCS: Performed by: PHYSICIAN ASSISTANT

## 2025-02-06 PROCEDURE — 1200000003 HC TELEMETRY R&B

## 2025-02-06 PROCEDURE — 97110 THERAPEUTIC EXERCISES: CPT

## 2025-02-06 RX ADMIN — POLYETHYLENE GLYCOL 3350 17 G: 17 POWDER, FOR SOLUTION ORAL at 09:48

## 2025-02-06 RX ADMIN — CHLORHEXIDINE GLUCONATE 15 ML: 1.2 RINSE ORAL at 21:21

## 2025-02-06 RX ADMIN — SODIUM CHLORIDE, PRESERVATIVE FREE 10 ML: 5 INJECTION INTRAVENOUS at 09:48

## 2025-02-06 RX ADMIN — SENNOSIDES 17.2 MG: 8.6 TABLET, COATED ORAL at 21:20

## 2025-02-06 RX ADMIN — CAPSAICIN: 0.25 CREAM TOPICAL at 09:47

## 2025-02-06 RX ADMIN — CHLORHEXIDINE GLUCONATE 15 ML: 1.2 RINSE ORAL at 09:47

## 2025-02-06 RX ADMIN — ACETAMINOPHEN 1000 MG: 500 TABLET ORAL at 05:18

## 2025-02-06 RX ADMIN — ENOXAPARIN SODIUM 30 MG: 100 INJECTION SUBCUTANEOUS at 09:47

## 2025-02-06 RX ADMIN — ENOXAPARIN SODIUM 30 MG: 100 INJECTION SUBCUTANEOUS at 21:20

## 2025-02-06 RX ADMIN — Medication 2 PUFF: at 16:27

## 2025-02-06 RX ADMIN — METOPROLOL TARTRATE 50 MG: 50 TABLET, FILM COATED ORAL at 09:48

## 2025-02-06 RX ADMIN — ACETAMINOPHEN 1000 MG: 500 TABLET ORAL at 16:54

## 2025-02-06 RX ADMIN — METOPROLOL TARTRATE 50 MG: 50 TABLET, FILM COATED ORAL at 21:22

## 2025-02-06 RX ADMIN — Medication 6 MG: at 21:20

## 2025-02-06 RX ADMIN — ACETAMINOPHEN 1000 MG: 500 TABLET ORAL at 23:53

## 2025-02-06 RX ADMIN — CAPSAICIN: 0.25 CREAM TOPICAL at 21:20

## 2025-02-06 RX ADMIN — Medication 12.5 MG: at 09:48

## 2025-02-06 RX ADMIN — SODIUM CHLORIDE, PRESERVATIVE FREE 10 ML: 5 INJECTION INTRAVENOUS at 21:22

## 2025-02-06 ASSESSMENT — PAIN SCALES - GENERAL
PAINLEVEL_OUTOF10: 6
PAINLEVEL_OUTOF10: 6
PAINLEVEL_OUTOF10: 4
PAINLEVEL_OUTOF10: 5

## 2025-02-06 ASSESSMENT — PAIN - FUNCTIONAL ASSESSMENT: PAIN_FUNCTIONAL_ASSESSMENT: ACTIVITIES ARE NOT PREVENTED

## 2025-02-06 ASSESSMENT — PAIN DESCRIPTION - ORIENTATION: ORIENTATION: LEFT

## 2025-02-06 ASSESSMENT — PAIN DESCRIPTION - LOCATION
LOCATION: HIP
LOCATION: LEG
LOCATION: HIP

## 2025-02-06 ASSESSMENT — PAIN DESCRIPTION - DESCRIPTORS
DESCRIPTORS: DISCOMFORT
DESCRIPTORS: ACHING

## 2025-02-06 NOTE — PROGRESS NOTES
shock (HCC) [A41.9, R65.20, J96.01] 02/02/2025    Paroxysmal atrial fibrillation (HCC) [I48.0] 02/02/2025    PETRA (acute kidney injury) (HCC) [N17.9] 02/02/2025    Diastolic heart failure with preserved ejection fraction (HCC) [I50.30] 02/02/2025    Acute respiratory failure with hypercapnia [J96.02] 02/02/2025    Decreased level of consciousness [R41.89] 01/30/2025    Fall [W19.XXXA] 01/30/2025    Trauma [T14.90XA] 01/29/2025    Elevation of cardiac enzymes [R74.8] 01/29/2025    CAD (coronary artery, 2009 [I25.10] 05/20/2012    Hyperlipemia [E78.5] 05/20/2012    Essential hypertension [I10] 05/20/2012    Pituitary adenoma (HCC) [D35.2] 05/20/2012             Chief Complaint: S/p left femur fracture    Hospital Course: HPI / Hospital Course:  Per initial HPI:\"This is an 84-year-old male who presented to [facility] ED after a fall without loss of consciousness or head trauma on 01/28/2025. Patient was seen and evaluated by trauma surgery and was found to have a nondisplaced left femoral neck fracture. The patient was admitted by orthopedic surgery with plan for left hemiarthroplasty. Analgesia regimen was initiated. Upon admission, the patient was noted to have a change in mentation. Secondary to concerns for CVA, a stroke alert was called. Imaging of the patient's head and neck showed no evidence of acute pathology. Additional imaging of the patient's chest showed no evidence of pulmonary embolism. The patient was started on continuous BiPAP and received naloxone. The patient remained persistently hypercapnic with BiPAP trial and was transferred to the intensive care unit after consent was obtained from the patient's family for intubation and mechanical ventilation. \"     Subjective: Patient examined was sitting on the bed during exam room air no acute distress.  Patient said he had a good night sleep reported overall was doing well happy with his progress.  No issues reported      Medications:  Reviewed    Infusion

## 2025-02-06 NOTE — PROCEDURES
PROCEDURE NOTE  Date: 2/6/2025   Name: Gabo Maldonado  YOB: 1940    Procedures  EKG completed

## 2025-02-06 NOTE — PROGRESS NOTES
Comprehensive Nutrition Assessment    Type and Reason for Visit:  Initial, LOS    Nutrition Recommendations/Plan:   Continue diet per provider and encourage adequate PO intake at best efforts.   Continue fluid restriction per provider  Start ONS: Mighty Shakes (BID)  Recommend MVI     Malnutrition Assessment:  Malnutrition Status:  At risk for malnutrition (02/06/25 1506)    Context:  Acute Illness     Findings of the 6 clinical characteristics of malnutrition:  Energy Intake:  Mild decrease in energy intake (varied intakes)  Weight Loss:   (edema this admit; 10# loss from June 2024)     Body Fat Loss:  No body fat loss     Muscle Mass Loss:  No muscle mass loss    Fluid Accumulation:  Mild Extremities   Strength:  Not Performed    Nutrition Assessment:     Pt. nutritionally compromised AEB varied PO intake this admit.  At risk for further nutrition compromise r/t admit with L femur fx - s/p L VALERIA 1/31, sepsis 2/2 moraxella PNA, CHF, PETRA, and underlying medical condition (PMHx: HTN, adenomatous colon polyp, HLD, A fib, former smoker).       Nutrition Related Findings:    Pt. Report/Treatments/Miscellaneous: Pt seen, family visiting at time of visit. Pt states he is eating well and denies any recent changes in appetite/po intake or weight. Pt denies N/V or chewing/swallowing difficulties. Varied PO intakes this admit - will add ONS to help meet nutritional needs.   GI Status: BM - 2/5  Pertinent Labs: Glucose 109  Pertinent Meds: melatonin,     Wound Type:  (s/p L hip VALERIA 1/31)       Current Nutrition Intake & Therapies:    Average Meal Intake:  (varied intakes)  Average Supplements Intake:  (initiate today)  ADULT DIET; Regular; 4 carb choices (60 gm/meal); Low Sodium (2 gm); 2000 ml  ADULT ORAL NUTRITION SUPPLEMENT; Breakfast, Dinner; Standard 4 oz Oral Supplement    Anthropometric Measures:  Height: 172.7 cm (5' 8\")  Ideal Body Weight (IBW): 154 lbs (70 kg)    Admission Body Weight: 126.6 kg (279 lb) (1/29:

## 2025-02-06 NOTE — DISCHARGE INSTR - DIET

## 2025-02-06 NOTE — PROGRESS NOTES
[facility]  INPATIENT PHYSICAL THERAPY  DAILY NOTE  [facility] ORTHOPEDICS 7K - 7K-28/028-A      Discharge Recommendations: Subacute/Skilled Nursing Facility  Equipment Recommendations: No               Time In: 1012  Time Out: 1050  Timed Code Treatment Minutes: 38 Minutes  Minutes: 38          Date: 2025  Patient Name: Gabo Maldonado,  Gender:  male        MRN: [id number]  : 1940  (84 y.o.)     Referring Practitioner: Leonor Hampton PA-C  Diagnosis: Trauma  Additional Pertinent Hx: 84-year-old male with a past medical history of hypertension, erectile dysfunction, hyperlipidemia, pituitary macroadenoma, and COPD originally presented to [facility] after a fall down 2 concrete steps resulting in left-sided hip pain.  The patient was found to have a left femoral neck fracture for which orthopedics was consulted with plans for operative management the following day.  The patient developed strokelike symptoms that included a right-sided facial droop and right-sided ataxia progressing to the need for a stroke alert to be called.  Patient fell outside the window for TNK, and had no evidence of LVO, and improved with Narcan, so no intervention deemed necessary by neurology.  On the floor, the patient developed lethargy, and shown to be hypercapnic, so he was placed on BiPAP.  Despite this, including changes to the settings, the patient did become more hypercapnic, so he transferred to ICU for hypercapnic respiratory failure. LEFT HIP TOTAL ARTHROPLASTY  By Dr Estrada     Prior Level of Function:  Lives With: Spouse  Type of Home: House  Home Layout: Two level, Bed/Bath upstairs, 1/2 bath on main level  Home Access: Stairs to enter with rails  Entrance Stairs - Number of Steps: 4  Entrance Stairs - Rails: Right  Home Equipment: Walker - Rolling   Bathroom Shower/Tub: Tub/Shower unit  Bathroom Equipment: Grab bars in shower    Prior Level of Assist for ADLs: Independent  Prior Level of Assist

## 2025-02-06 NOTE — PROGRESS NOTES
Orthopaedic Progress Note      SUBJECTIVE   Mr. Maldonado is post op day # 6 L VALERIA    Seen at bedside this morning, eating breakfast  no adverse events overnight  Slept well  +BM  tolerating oral diet  Denies any numbness/paresthesia in operative extremity  Good efforts PT OT, pain control stable       OBJECTIVE      Physical    VITALS:  BP (!) 143/84   Pulse 77   Temp 97.3 °F (36.3 °C) (Oral)   Resp 16   Ht 1.727 m (5' 8\")   Wt 121.9 kg (268 lb 11.9 oz)   SpO2 95%   BMI 40.86 kg/m²   I/O last 3 completed shifts:  In: 950 [P.O.:950]  Out: 2900 [Urine:2900]    3/10 pain  Gen: alert and oriented  Head: normorcephalic, atraumatic  Resp: unlabored, room air  Pelvis: stable  LLE prineo intact, no drainage  Sensation to light touch intact  Gastroc TA EHL intact  Distal pulses palpable, warm well perfused  Calf supple nontender to palpation       Data  CBC:   Lab Results   Component Value Date/Time    WBC 7.5 02/06/2025 07:42 AM    HGB 12.4 02/06/2025 07:42 AM     02/06/2025 07:42 AM     BMP:    Lab Results   Component Value Date/Time     02/06/2025 07:41 AM    K 4.5 02/06/2025 07:41 AM    K 5.3 10/19/2021 08:48 AM    CL 98 02/06/2025 07:41 AM    CO2 24 02/06/2025 07:41 AM    BUN 18 02/06/2025 07:41 AM    CREATININE 0.8 02/06/2025 07:41 AM    CALCIUM 9.2 02/06/2025 07:41 AM    GLUCOSE 109 02/06/2025 07:41 AM     Uric Acid:  No components found for: \"URIC\"  PT/INR:    Lab Results   Component Value Date/Time    INR 1.21 01/31/2025 03:28 AM     Troponin:  No results found for: \"TROPONINI\"  Urine Culture:  No components found for: \"CURINE\"      Current Inpatient Medications    Current Facility-Administered Medications: enoxaparin Sodium (LOVENOX) injection 30 mg, 30 mg, SubCUTAneous, BID  naloxegol (MOVANTIK) tablet 12.5 mg, 12.5 mg, Oral, QAM AC  senna (SENOKOT) tablet 17.2 mg, 2 tablet, Oral, Nightly  capsaicin (ZOSTRIX) 0.025 % cream, , Topical, BID  acetaminophen (TYLENOL) tablet 1,000 mg, 1,000 mg,

## 2025-02-07 LAB
ANION GAP SERPL CALC-SCNC: 17 MEQ/L (ref 8–16)
BUN SERPL-MCNC: 18 MG/DL (ref 7–22)
CALCIUM SERPL-MCNC: 9.2 MG/DL (ref 8.5–10.5)
CHLORIDE SERPL-SCNC: 99 MEQ/L (ref 98–111)
CO2 SERPL-SCNC: 25 MEQ/L (ref 23–33)
CREAT SERPL-MCNC: 0.8 MG/DL (ref 0.4–1.2)
DEPRECATED RDW RBC AUTO: 47.4 FL (ref 35–45)
ERYTHROCYTE [DISTWIDTH] IN BLOOD BY AUTOMATED COUNT: 13.8 % (ref 11.5–14.5)
GFR SERPL CREATININE-BSD FRML MDRD: 86 ML/MIN/1.73M2
GLUCOSE SERPL-MCNC: 106 MG/DL (ref 70–108)
HCT VFR BLD AUTO: 38.2 % (ref 42–52)
HGB BLD-MCNC: 12.7 GM/DL (ref 14–18)
MAGNESIUM SERPL-MCNC: 2 MG/DL (ref 1.6–2.4)
MCH RBC QN AUTO: 31.9 PG (ref 26–33)
MCHC RBC AUTO-ENTMCNC: 33.2 GM/DL (ref 32.2–35.5)
MCV RBC AUTO: 96 FL (ref 80–94)
PLATELET # BLD AUTO: 199 THOU/MM3 (ref 130–400)
PMV BLD AUTO: 10.5 FL (ref 9.4–12.4)
POTASSIUM SERPL-SCNC: 4.5 MEQ/L (ref 3.5–5.2)
RBC # BLD AUTO: 3.98 MILL/MM3 (ref 4.7–6.1)
SODIUM SERPL-SCNC: 141 MEQ/L (ref 135–145)
WBC # BLD AUTO: 8.7 THOU/MM3 (ref 4.8–10.8)

## 2025-02-07 PROCEDURE — 80048 BASIC METABOLIC PNL TOTAL CA: CPT

## 2025-02-07 PROCEDURE — 36415 COLL VENOUS BLD VENIPUNCTURE: CPT

## 2025-02-07 PROCEDURE — 97110 THERAPEUTIC EXERCISES: CPT

## 2025-02-07 PROCEDURE — 1200000003 HC TELEMETRY R&B

## 2025-02-07 PROCEDURE — 6360000002 HC RX W HCPCS: Performed by: PHYSICIAN ASSISTANT

## 2025-02-07 PROCEDURE — 6370000000 HC RX 637 (ALT 250 FOR IP): Performed by: PHYSICIAN ASSISTANT

## 2025-02-07 PROCEDURE — 6370000000 HC RX 637 (ALT 250 FOR IP)

## 2025-02-07 PROCEDURE — 94640 AIRWAY INHALATION TREATMENT: CPT

## 2025-02-07 PROCEDURE — 97116 GAIT TRAINING THERAPY: CPT

## 2025-02-07 PROCEDURE — 97129 THER IVNTJ 1ST 15 MIN: CPT

## 2025-02-07 PROCEDURE — 85027 COMPLETE CBC AUTOMATED: CPT

## 2025-02-07 PROCEDURE — 83735 ASSAY OF MAGNESIUM: CPT

## 2025-02-07 PROCEDURE — 2500000003 HC RX 250 WO HCPCS: Performed by: PHYSICIAN ASSISTANT

## 2025-02-07 PROCEDURE — 99232 SBSQ HOSP IP/OBS MODERATE 35: CPT | Performed by: NURSE PRACTITIONER

## 2025-02-07 PROCEDURE — 6370000000 HC RX 637 (ALT 250 FOR IP): Performed by: STUDENT IN AN ORGANIZED HEALTH CARE EDUCATION/TRAINING PROGRAM

## 2025-02-07 RX ADMIN — SENNOSIDES 17.2 MG: 8.6 TABLET, COATED ORAL at 21:07

## 2025-02-07 RX ADMIN — SODIUM CHLORIDE, PRESERVATIVE FREE 10 ML: 5 INJECTION INTRAVENOUS at 11:01

## 2025-02-07 RX ADMIN — SODIUM CHLORIDE, PRESERVATIVE FREE 10 ML: 5 INJECTION INTRAVENOUS at 21:09

## 2025-02-07 RX ADMIN — ACETAMINOPHEN 1000 MG: 500 TABLET ORAL at 06:06

## 2025-02-07 RX ADMIN — TIOTROPIUM BROMIDE INHALATION SPRAY 2 PUFF: 3.12 SPRAY, METERED RESPIRATORY (INHALATION) at 08:44

## 2025-02-07 RX ADMIN — Medication 2 PUFF: at 08:44

## 2025-02-07 RX ADMIN — CAPSAICIN: 0.25 CREAM TOPICAL at 11:01

## 2025-02-07 RX ADMIN — ACETAMINOPHEN 1000 MG: 500 TABLET ORAL at 15:02

## 2025-02-07 RX ADMIN — ENOXAPARIN SODIUM 30 MG: 100 INJECTION SUBCUTANEOUS at 11:00

## 2025-02-07 RX ADMIN — QUETIAPINE FUMARATE 25 MG: 25 TABLET ORAL at 21:08

## 2025-02-07 RX ADMIN — QUETIAPINE FUMARATE 25 MG: 25 TABLET ORAL at 11:01

## 2025-02-07 RX ADMIN — CAPSAICIN: 0.25 CREAM TOPICAL at 21:09

## 2025-02-07 RX ADMIN — Medication 6 MG: at 21:07

## 2025-02-07 RX ADMIN — CHLORHEXIDINE GLUCONATE 15 ML: 1.2 RINSE ORAL at 21:08

## 2025-02-07 RX ADMIN — Medication 12.5 MG: at 11:00

## 2025-02-07 RX ADMIN — METOPROLOL TARTRATE 50 MG: 50 TABLET, FILM COATED ORAL at 21:08

## 2025-02-07 RX ADMIN — ENOXAPARIN SODIUM 30 MG: 100 INJECTION SUBCUTANEOUS at 21:07

## 2025-02-07 RX ADMIN — CHLORHEXIDINE GLUCONATE 15 ML: 1.2 RINSE ORAL at 11:01

## 2025-02-07 RX ADMIN — ACETAMINOPHEN 1000 MG: 500 TABLET ORAL at 21:11

## 2025-02-07 RX ADMIN — POLYETHYLENE GLYCOL 3350 17 G: 17 POWDER, FOR SOLUTION ORAL at 11:00

## 2025-02-07 RX ADMIN — METOPROLOL TARTRATE 50 MG: 50 TABLET, FILM COATED ORAL at 11:00

## 2025-02-07 ASSESSMENT — PAIN DESCRIPTION - ORIENTATION: ORIENTATION: LEFT

## 2025-02-07 ASSESSMENT — PAIN SCALES - GENERAL
PAINLEVEL_OUTOF10: 5
PAINLEVEL_OUTOF10: 3
PAINLEVEL_OUTOF10: 5

## 2025-02-07 ASSESSMENT — PAIN DESCRIPTION - DESCRIPTORS: DESCRIPTORS: ACHING;DISCOMFORT

## 2025-02-07 ASSESSMENT — PAIN DESCRIPTION - LOCATION: LOCATION: LEG

## 2025-02-07 ASSESSMENT — PAIN - FUNCTIONAL ASSESSMENT: PAIN_FUNCTIONAL_ASSESSMENT: ACTIVITIES ARE NOT PREVENTED

## 2025-02-07 NOTE — PROGRESS NOTES
Orthopaedic Progress Note      SUBJECTIVE   Mr. Maldonado is post op day # 7 L VALERIA    Seen at bedside this morning  no adverse events overnight  Sleeping well  +BM  tolerating oral diet  Denies any numbness/paresthesia in operative extremity  Good efforts PT OT, pain control stable       OBJECTIVE      Physical    VITALS:  /71   Pulse 89   Temp 97.2 °F (36.2 °C) (Oral)   Resp 18   Ht 1.727 m (5' 8\")   Wt 121.9 kg (268 lb 11.9 oz)   SpO2 92%   BMI 40.86 kg/m²   I/O last 3 completed shifts:  In: 250 [P.O.:250]  Out: 2350 [Urine:2350]    3/10 pain  Gen: alert and oriented  Head: normorcephalic, atraumatic  Resp: unlabored, room air  Pelvis: stable  LLE prineo intact, no drainage  Sensation to light touch intact  Gastroc TA EHL intact  Distal pulses palpable, warm well perfused  Calf supple nontender to palpation       Data  CBC:   Lab Results   Component Value Date/Time    WBC 7.5 02/06/2025 07:42 AM    HGB 12.4 02/06/2025 07:42 AM     02/06/2025 07:42 AM     BMP:    Lab Results   Component Value Date/Time     02/06/2025 07:41 AM    K 4.5 02/06/2025 07:41 AM    K 5.3 10/19/2021 08:48 AM    CL 98 02/06/2025 07:41 AM    CO2 24 02/06/2025 07:41 AM    BUN 18 02/06/2025 07:41 AM    CREATININE 0.8 02/06/2025 07:41 AM    CALCIUM 9.2 02/06/2025 07:41 AM    GLUCOSE 109 02/06/2025 07:41 AM     Uric Acid:  No components found for: \"URIC\"  PT/INR:    Lab Results   Component Value Date/Time    INR 1.21 01/31/2025 03:28 AM     Troponin:  No results found for: \"TROPONINI\"  Urine Culture:  No components found for: \"CURINE\"      Current Inpatient Medications    Current Facility-Administered Medications: enoxaparin Sodium (LOVENOX) injection 30 mg, 30 mg, SubCUTAneous, BID  naloxegol (MOVANTIK) tablet 12.5 mg, 12.5 mg, Oral, QAM AC  senna (SENOKOT) tablet 17.2 mg, 2 tablet, Oral, Nightly  capsaicin (ZOSTRIX) 0.025 % cream, , Topical, BID  acetaminophen (TYLENOL) tablet 1,000 mg, 1,000 mg, Oral, Q6H PRN  sodium

## 2025-02-07 NOTE — PROGRESS NOTES
[facility]  INPATIENT PHYSICAL THERAPY  DAILY NOTE  [facility] ORTHOPEDICS 7K - 7K-28/028-A      Discharge Recommendations: Subacute/Skilled Nursing Facility  Equipment Recommendations: No               Time In: 909  Time Out: 944  Timed Code Treatment Minutes: 35 Minutes  Minutes: 35          Date: 2025  Patient Name: Gabo Maldonado,  Gender:  male        MRN: [id number]  : 1940  (84 y.o.)     Referring Practitioner: Leonor Hampton PA-C  Diagnosis: Trauma  Additional Pertinent Hx: 84-year-old male with a past medical history of hypertension, erectile dysfunction, hyperlipidemia, pituitary macroadenoma, and COPD originally presented to [facility] after a fall down 2 concrete steps resulting in left-sided hip pain.  The patient was found to have a left femoral neck fracture for which orthopedics was consulted with plans for operative management the following day.  The patient developed strokelike symptoms that included a right-sided facial droop and right-sided ataxia progressing to the need for a stroke alert to be called.  Patient fell outside the window for TNK, and had no evidence of LVO, and improved with Narcan, so no intervention deemed necessary by neurology.  On the floor, the patient developed lethargy, and shown to be hypercapnic, so he was placed on BiPAP.  Despite this, including changes to the settings, the patient did become more hypercapnic, so he transferred to ICU for hypercapnic respiratory failure. LEFT HIP TOTAL ARTHROPLASTY  By Dr Estrada     Prior Level of Function:  Lives With: Spouse  Type of Home: House  Home Layout: Two level, Bed/Bath upstairs, 1/2 bath on main level  Home Access: Stairs to enter with rails  Entrance Stairs - Number of Steps: 4  Entrance Stairs - Rails: Right  Home Equipment: Walker - Rolling   Bathroom Shower/Tub: Tub/Shower unit  Bathroom Equipment: Grab bars in shower    Prior Level of Assist for ADLs: Independent  Prior Level of Assist

## 2025-02-07 NOTE — PROGRESS NOTES
Spiritual Health History and Assessment/Progress Note  [facility]    (P) Follow-up,  ,  ,      Name: Gabo Maldonado MRN: [id number]    Age: 84 y.o.     Sex: male   Language: English   Pentecostal: Anglican   Trauma     Date: 2/7/2025            Total Time Calculated: (P) 10 min              [facility]        Referral/Consult From: (P) Rounding   Encounter Overview/Reason: (P) Follow-up  Service Provided For: (P) Patient and family together    Irasema, Belief, Meaning:   Patient identifies as spiritual, is connected with a irasema tradition or spiritual practice, and has beliefs or practices that help with coping during difficult times  Family/Friends identify as spiritual, are connected with a irasema tradition or spiritual practice, and have beliefs or practices that help with coping during difficult times      Importance and Influence:  Patient has spiritual/personal beliefs that influence decisions regarding their health  Family/Friends have spiritual/personal beliefs that influence decisions regarding the patient's health    Community:  Patient is connected with a spiritual community  Family/Friends are connected with a spiritual community:    Assessment and Plan of Care:     Patient Interventions include: Facilitated expression of thoughts and feelings  Family/Friends Interventions include: Facilitated expression of thoughts and feelings    Patient Plan of Care: Spiritual Care available upon further referral  Family/Friends Plan of Care: Spiritual Care available upon further referral    Electronically signed by PANCHO Morgan on 2/7/2025 at 5:13 PM

## 2025-02-07 NOTE — PROGRESS NOTES
13:30 - Was called to patients room along with RN for concern of mumbled speech by Speech therapy. Vitals were obtained. Hospitalist was notified in person and came and evaluated patient. Patient stated they were in a deep sleep. See Hospitalist note     14:30 This nurse reassessed patient. Performed neuro checks. No facial drooping, mumbled speech, numbness or tingling or vision changes    1608 RN reassessed patient. Performed neuro checks. No facial drooping, mumbled speech, numbness or tingling. Patient denies having any weakness when getting up to chair.

## 2025-02-07 NOTE — PROGRESS NOTES
[facility]  INPATIENT SPEECH THERAPY  [facility] ORTHOPEDICS 7K  DAILY NOTE    Discharge Recommendations: Inpatient Rehabilitation    SLP Individual Minutes  Time In: 1303  Time Out: 1316  Minutes: 13  Timed Code Treatment Minutes: 13 Minutes       Date: 2025  Patient Name: Gabo Maldonado      CSN: [id number]   : 1940  (84 y.o.)  Gender: male   Referring Physician:  Joseline Hernández PA-C   Diagnosis: Trauma  Precautions: Fall risk   Current Diet: Regular diet and thin liquids   Respiratory Status: Room Air  Swallowing Strategies: Standard Universal Swallow Precautions  Date of Last MBS/FEES: Not Applicable    Pain:  No pain reported.    Subjective:  Session approved by nursing staff. Upon entrance into room, patient seen upright in bed with family present. Fully alert and cooperative. Upon ST introduction, patient/family reporting patient woke up from a deep sleep ~20 minutes ago. States upon awakening, patient with mumbled speech production, low vocal intensity, patient reports of bilateral numbness in hands and difficulty swallowing. Call light placed on with ST immediately notifying nursing staff. Nursing staff obtaining vitals and JOHNNY Greenwood immediately in to assess patient.     ST assessment:   Orientation:  indep   OME: Fully intact   Sequencing of events leading up to ST arrival: good thought organization/cohesion. Speech intelligibility 100%. Resolution of symptoms noted.   Appropriate maintained attention to conversation. No overt eye gaze/deviation noted with patient reports of above symptoms.     Short-Term Goals:  SHORT TERM GOAL #1:  Goal 1: Patient will complete functional immediate/delayed recall tasks with 70% accuracy provided mod cues  to improve retention of pertinent, novel information r/t daily living requirements.  INTERVENTIONS: See above.     SHORT TERM GOAL #2:  Goal 2: Patient will complete problem solving, verbal/visual reasoning, and executive

## 2025-02-07 NOTE — PROGRESS NOTES
by: Ck Gutierrez MD on    01/29/2025 08:27 PM      All CTs at this facility use dose modulation techniques and iterative    reconstructions, and/or weight-based dosing   when appropriate to reduce radiation to a low as reasonably achievable.      CT PELVIS WO CONTRAST Additional Contrast? None   Final Result   Nondisplaced impacted left femoral neck fracture.      This document has been electronically signed by: Ck Gutierrez MD on    01/29/2025 12:34 AM      All CTs at this facility use dose modulation techniques and iterative    reconstructions, and/or weight-based dosing   when appropriate to reduce radiation to a low as reasonably achievable.      XR FEMUR LEFT (MIN 2 VIEWS)   Final Result   1. Normal left femur      This document has been electronically signed by: Ck Gutierrez MD on    01/28/2025 11:25 PM      XR PELVIS (1-2 VIEWS)   Final Result   1. No acute findings.      This document has been electronically signed by: Ck Gutierrez MD on    01/28/2025 11:27 PM          Diet: ADULT DIET; Regular; 4 carb choices (60 gm/meal); Low Sodium (2 gm); 2000 ml  ADULT ORAL NUTRITION SUPPLEMENT; Breakfast, Dinner; Standard 4 oz Oral Supplement    DVT prophylaxis: [x] Lovenox                                 [] SCDs                                 [] SQ Heparin                                 [] Encourage ambulation           [] Already on Anticoagulation     Disposition:    [] Home       [] TCU       [] Rehab       [] Psych       [] SNF       [] Long Term Care Facility       [] Other-    Code Status: Full Code    PT/OT Eval Status:         Electronically signed by IKLEY Greenwood CNP on 2/7/2025 at 12:01 PM

## 2025-02-07 NOTE — PROGRESS NOTES
[facility]  OCCUPATIONAL THERAPY MISSED TREATMENT NOTE  [facility] 7K  7K-28/028-A      Date: 2025  Patient Name: Gabo Maldonado        CSN: [id number]   : 1940  (84 y.o.)  Gender: male   Referring Practitioner: Leonor Hampton PA-C            REASON FOR MISSED TREATMENT: Patient Refused.  Patient laying in bed upon arrival; family present.  Patient notes \"I was up earlier and walked around with that girl this morning and I just want to try to go back to sleep right now\".  Family notes upon arrival this afternoon, patient was sleeping and when they woke him up he had a hard time swallowing his food, mumbled speech however was assessed by nursing staff and ok'd therapy.  Patient continues to decline at this time despite education and importance of participation.  Will attempt next available time.

## 2025-02-07 NOTE — PROGRESS NOTES
[facility]  SPEECH THERAPY MISSED TREATMENT NOTE  [facility]      Date: 2025  Patient Name: Gabo Maldonado        MRN: [id number]    : 1940  (84 y.o.)    REASON FOR MISSED TREATMENT:   attempts to provide skilled cognitive therapy. Upon entrance into room, patient working with therapy. Will check back later this date as able.     Donna Poon M.S. CCC-SLP [id number] 2025

## 2025-02-08 LAB
ANION GAP SERPL CALC-SCNC: 9 MEQ/L (ref 8–16)
BUN SERPL-MCNC: 20 MG/DL (ref 7–22)
CALCIUM SERPL-MCNC: 9.1 MG/DL (ref 8.5–10.5)
CHLORIDE SERPL-SCNC: 100 MEQ/L (ref 98–111)
CO2 SERPL-SCNC: 28 MEQ/L (ref 23–33)
CREAT SERPL-MCNC: 0.8 MG/DL (ref 0.4–1.2)
DEPRECATED RDW RBC AUTO: 47.8 FL (ref 35–45)
ERYTHROCYTE [DISTWIDTH] IN BLOOD BY AUTOMATED COUNT: 13.8 % (ref 11.5–14.5)
GFR SERPL CREATININE-BSD FRML MDRD: 86 ML/MIN/1.73M2
GLUCOSE SERPL-MCNC: 135 MG/DL (ref 70–108)
HCT VFR BLD AUTO: 36.6 % (ref 42–52)
HGB BLD-MCNC: 12 GM/DL (ref 14–18)
MAGNESIUM SERPL-MCNC: 2.1 MG/DL (ref 1.6–2.4)
MCH RBC QN AUTO: 31.5 PG (ref 26–33)
MCHC RBC AUTO-ENTMCNC: 32.8 GM/DL (ref 32.2–35.5)
MCV RBC AUTO: 96.1 FL (ref 80–94)
PLATELET # BLD AUTO: 207 THOU/MM3 (ref 130–400)
PMV BLD AUTO: 10.7 FL (ref 9.4–12.4)
POTASSIUM SERPL-SCNC: 4.3 MEQ/L (ref 3.5–5.2)
RBC # BLD AUTO: 3.81 MILL/MM3 (ref 4.7–6.1)
SODIUM SERPL-SCNC: 137 MEQ/L (ref 135–145)
WBC # BLD AUTO: 6.5 THOU/MM3 (ref 4.8–10.8)

## 2025-02-08 PROCEDURE — 99232 SBSQ HOSP IP/OBS MODERATE 35: CPT

## 2025-02-08 PROCEDURE — 85027 COMPLETE CBC AUTOMATED: CPT

## 2025-02-08 PROCEDURE — 97530 THERAPEUTIC ACTIVITIES: CPT

## 2025-02-08 PROCEDURE — 6370000000 HC RX 637 (ALT 250 FOR IP): Performed by: STUDENT IN AN ORGANIZED HEALTH CARE EDUCATION/TRAINING PROGRAM

## 2025-02-08 PROCEDURE — 2500000003 HC RX 250 WO HCPCS: Performed by: PHYSICIAN ASSISTANT

## 2025-02-08 PROCEDURE — 83735 ASSAY OF MAGNESIUM: CPT

## 2025-02-08 PROCEDURE — 97116 GAIT TRAINING THERAPY: CPT

## 2025-02-08 PROCEDURE — 6370000000 HC RX 637 (ALT 250 FOR IP): Performed by: PHYSICIAN ASSISTANT

## 2025-02-08 PROCEDURE — 97110 THERAPEUTIC EXERCISES: CPT

## 2025-02-08 PROCEDURE — 6360000002 HC RX W HCPCS: Performed by: PHYSICIAN ASSISTANT

## 2025-02-08 PROCEDURE — 6370000000 HC RX 637 (ALT 250 FOR IP)

## 2025-02-08 PROCEDURE — 1200000003 HC TELEMETRY R&B

## 2025-02-08 PROCEDURE — 80048 BASIC METABOLIC PNL TOTAL CA: CPT

## 2025-02-08 PROCEDURE — 36415 COLL VENOUS BLD VENIPUNCTURE: CPT

## 2025-02-08 RX ADMIN — SALINE NASAL SPRAY 1 SPRAY: 1.5 SOLUTION NASAL at 21:55

## 2025-02-08 RX ADMIN — ACETAMINOPHEN 1000 MG: 500 TABLET ORAL at 12:31

## 2025-02-08 RX ADMIN — SENNOSIDES 17.2 MG: 8.6 TABLET, COATED ORAL at 21:44

## 2025-02-08 RX ADMIN — SODIUM CHLORIDE, PRESERVATIVE FREE 10 ML: 5 INJECTION INTRAVENOUS at 21:45

## 2025-02-08 RX ADMIN — METOPROLOL TARTRATE 50 MG: 50 TABLET, FILM COATED ORAL at 21:55

## 2025-02-08 RX ADMIN — CHLORHEXIDINE GLUCONATE 15 ML: 1.2 RINSE ORAL at 08:39

## 2025-02-08 RX ADMIN — SODIUM CHLORIDE, PRESERVATIVE FREE 10 ML: 5 INJECTION INTRAVENOUS at 08:39

## 2025-02-08 RX ADMIN — ACETAMINOPHEN 1000 MG: 500 TABLET ORAL at 04:59

## 2025-02-08 RX ADMIN — Medication 6 MG: at 21:41

## 2025-02-08 RX ADMIN — ENOXAPARIN SODIUM 30 MG: 100 INJECTION SUBCUTANEOUS at 21:44

## 2025-02-08 RX ADMIN — CAPSAICIN: 0.25 CREAM TOPICAL at 08:44

## 2025-02-08 RX ADMIN — METOPROLOL TARTRATE 50 MG: 50 TABLET, FILM COATED ORAL at 08:39

## 2025-02-08 RX ADMIN — CHLORHEXIDINE GLUCONATE 15 ML: 1.2 RINSE ORAL at 21:41

## 2025-02-08 RX ADMIN — Medication 12.5 MG: at 08:39

## 2025-02-08 RX ADMIN — CAPSAICIN: 0.25 CREAM TOPICAL at 21:54

## 2025-02-08 RX ADMIN — POLYETHYLENE GLYCOL 3350 17 G: 17 POWDER, FOR SOLUTION ORAL at 08:39

## 2025-02-08 RX ADMIN — ENOXAPARIN SODIUM 30 MG: 100 INJECTION SUBCUTANEOUS at 08:39

## 2025-02-08 RX ADMIN — ACETAMINOPHEN 1000 MG: 500 TABLET ORAL at 21:42

## 2025-02-08 ASSESSMENT — PAIN SCALES - GENERAL
PAINLEVEL_OUTOF10: 5
PAINLEVEL_OUTOF10: 5
PAINLEVEL_OUTOF10: 0
PAINLEVEL_OUTOF10: 7

## 2025-02-08 ASSESSMENT — PAIN DESCRIPTION - DESCRIPTORS
DESCRIPTORS: THROBBING;ACHING
DESCRIPTORS: ACHING;DULL

## 2025-02-08 ASSESSMENT — PAIN DESCRIPTION - ORIENTATION
ORIENTATION: LEFT
ORIENTATION: LEFT

## 2025-02-08 ASSESSMENT — PAIN DESCRIPTION - LOCATION
LOCATION: HIP
LOCATION: HIP

## 2025-02-08 ASSESSMENT — PAIN - FUNCTIONAL ASSESSMENT: PAIN_FUNCTIONAL_ASSESSMENT: ACTIVITIES ARE NOT PREVENTED

## 2025-02-08 NOTE — PROGRESS NOTES
Hospitalist Progress Note      Patient:  Gabo Maldonado 84 y.o. male     : 1940  Unit/Bed:[facility]-A  Date of Admission: 2025        ASSESSMENT AND PLAN    Active Problems  #L femoral neck fracture: Ortho consulted on admission, POD8 L VALERIA. Ortho primary. PT/OT. Lovenox in-house for ppx. Deferred OAC per below.    -ASA 81mg on discharge  #New-onset AF: [id number] 3. No home AC. Patient deferred OAC at discharge given cost following risk/benefit discussion previously. Patient to follow-up with cardiology for Watchmen discussion. Continue rate-control with Lopressor 50mg bid  #NSVT: Short-run noted on telemetry. Electrolytes WNL. Continue telemetry, can consider event monitor at dc  #?COPD: Per chart review, hypercapnia during admission with concern for underlying COPDe. Patient stable on RA. Albuterol inhaler prn at home. Likely would benefit from OP PFTs for further delineation of underlying pulmonary disease  Resolved Problems  #Sepsis 2/2 CAP: Right-sided consolidation noted on previous imaging, Pna pcr with Morxella. Hypotension at that time requiring ICU/pressors. Weaned/transferred to floor. Completed CTX. Remains on RA  #Acute hypercapnic respiratory failure  Chronic Conditions (reviewed and stable unless otherwise stated)  #HTN: Continue Lopressor. Lisinopril held in setting of soft bps  #Chronic diastolic HF: ECHO 25: EF 60-65%, diastolic dysfunction present. No SGLT2 - will defer to cardiology follow-up OP. Continue BB. Daily weights, I/Os  #Obesity  #CAD: Non-obstructive 201 LHC per chart. ASA at dc  #Hx pituitary adenoma: With acromegaly. Noted  #GERD: PPI    LDA: []CVC / []PICC / []Midline / []Gallardo / []Drains / []Mediport / []None  Antibiotics: No  Steroids: No  Labs (still needed?): [x]Yes / []No  IVF (still needed?): []Yes / [x]No    Level of care: []Step Down / [x]Med-Surg  Bed Status: [x]Inpatient / []Observation  Telemetry: [x]Yes / []No  PT/OT: [x]Yes / []No    DVT

## 2025-02-08 NOTE — PROGRESS NOTES
[facility]  INPATIENT PHYSICAL THERAPY  DAILY NOTE  [facility] 7K - 7K-28/028-A      Discharge Recommendations: Subacute/Skilled Nursing Facility  Equipment Recommendations: No             Time In: 0858  Time Out: 0936  Timed Code Treatment Minutes: 38 Minutes  Minutes: 38      Date: 2025  Patient Name: Gabo Maldonado,  Gender:  male        MRN: [id number]  : 1940  (84 y.o.)     Referring Practitioner: Leonor Hampton PA-C  Diagnosis: Trauma  Additional Pertinent Hx: 84-year-old male with a past medical history of hypertension, erectile dysfunction, hyperlipidemia, pituitary macroadenoma, and COPD originally presented to [facility] after a fall down 2 concrete steps resulting in left-sided hip pain.  The patient was found to have a left femoral neck fracture for which orthopedics was consulted with plans for operative management the following day.  The patient developed strokelike symptoms that included a right-sided facial droop and right-sided ataxia progressing to the need for a stroke alert to be called.  Patient fell outside the window for TNK, and had no evidence of LVO, and improved with Narcan, so no intervention deemed necessary by neurology.  On the floor, the patient developed lethargy, and shown to be hypercapnic, so he was placed on BiPAP.  Despite this, including changes to the settings, the patient did become more hypercapnic, so he transferred to ICU for hypercapnic respiratory failure. LEFT HIP TOTAL ARTHROPLASTY  By Dr Estrada     Prior Level of Function:  Lives With: Spouse  Type of Home: House  Home Layout: Two level, Bed/Bath upstairs, 1/2 bath on main level  Home Access: Stairs to enter with rails  Entrance Stairs - Number of Steps: 4  Entrance Stairs - Rails: Right  Home Equipment: Walker - Rolling   Bathroom Shower/Tub: Tub/Shower unit  Bathroom Equipment: Grab bars in shower    Prior Level of Assist for ADLs: Independent  Prior Level of Assist for

## 2025-02-08 NOTE — PROGRESS NOTES
IntraVENous, Q6H PRN  polyethylene glycol (GLYCOLAX) packet 17 g, 17 g, Oral, Daily  0.9 % sodium chloride infusion, , IntraVENous, PRN  naloxone (NARCAN) injection 0.4 mg, 0.4 mg, IntraVENous, PRN        PLAN    1) continue with current medical management  2) Weightbearing as tolerated left lower extremity, activity as tolerated  3) continue with Lovenox as part of VTE prophylaxis  4) PT/OT  5) reinforce with dry dressings as needed for drainage, otherwise leave open to air.  Do not remove the Prineo dressing  6) anticipating ECF upon discharge, pending pre-CERT    Ck Hogue PA-C

## 2025-02-09 LAB
ANION GAP SERPL CALC-SCNC: 13 MEQ/L (ref 8–16)
BUN SERPL-MCNC: 17 MG/DL (ref 7–22)
CALCIUM SERPL-MCNC: 9 MG/DL (ref 8.5–10.5)
CHLORIDE SERPL-SCNC: 98 MEQ/L (ref 98–111)
CO2 SERPL-SCNC: 28 MEQ/L (ref 23–33)
CREAT SERPL-MCNC: 0.8 MG/DL (ref 0.4–1.2)
DEPRECATED RDW RBC AUTO: 47.9 FL (ref 35–45)
ERYTHROCYTE [DISTWIDTH] IN BLOOD BY AUTOMATED COUNT: 13.8 % (ref 11.5–14.5)
GFR SERPL CREATININE-BSD FRML MDRD: 86 ML/MIN/1.73M2
GLUCOSE SERPL-MCNC: 114 MG/DL (ref 70–108)
HCT VFR BLD AUTO: 36.7 % (ref 42–52)
HGB BLD-MCNC: 12.2 GM/DL (ref 14–18)
MAGNESIUM SERPL-MCNC: 2.1 MG/DL (ref 1.6–2.4)
MCH RBC QN AUTO: 32.2 PG (ref 26–33)
MCHC RBC AUTO-ENTMCNC: 33.2 GM/DL (ref 32.2–35.5)
MCV RBC AUTO: 96.8 FL (ref 80–94)
PLATELET # BLD AUTO: 232 THOU/MM3 (ref 130–400)
PMV BLD AUTO: 10.4 FL (ref 9.4–12.4)
POTASSIUM SERPL-SCNC: 4.4 MEQ/L (ref 3.5–5.2)
RBC # BLD AUTO: 3.79 MILL/MM3 (ref 4.7–6.1)
SODIUM SERPL-SCNC: 139 MEQ/L (ref 135–145)
WBC # BLD AUTO: 6.7 THOU/MM3 (ref 4.8–10.8)

## 2025-02-09 PROCEDURE — 80048 BASIC METABOLIC PNL TOTAL CA: CPT

## 2025-02-09 PROCEDURE — 6360000002 HC RX W HCPCS: Performed by: PHYSICIAN ASSISTANT

## 2025-02-09 PROCEDURE — 6370000000 HC RX 637 (ALT 250 FOR IP)

## 2025-02-09 PROCEDURE — 6370000000 HC RX 637 (ALT 250 FOR IP): Performed by: PHYSICIAN ASSISTANT

## 2025-02-09 PROCEDURE — 83735 ASSAY OF MAGNESIUM: CPT

## 2025-02-09 PROCEDURE — 97530 THERAPEUTIC ACTIVITIES: CPT

## 2025-02-09 PROCEDURE — 99232 SBSQ HOSP IP/OBS MODERATE 35: CPT

## 2025-02-09 PROCEDURE — 94640 AIRWAY INHALATION TREATMENT: CPT

## 2025-02-09 PROCEDURE — 6370000000 HC RX 637 (ALT 250 FOR IP): Performed by: STUDENT IN AN ORGANIZED HEALTH CARE EDUCATION/TRAINING PROGRAM

## 2025-02-09 PROCEDURE — 36415 COLL VENOUS BLD VENIPUNCTURE: CPT

## 2025-02-09 PROCEDURE — 85027 COMPLETE CBC AUTOMATED: CPT

## 2025-02-09 PROCEDURE — 1200000003 HC TELEMETRY R&B

## 2025-02-09 PROCEDURE — 94761 N-INVAS EAR/PLS OXIMETRY MLT: CPT

## 2025-02-09 PROCEDURE — 2500000003 HC RX 250 WO HCPCS: Performed by: PHYSICIAN ASSISTANT

## 2025-02-09 RX ADMIN — ACETAMINOPHEN 1000 MG: 500 TABLET ORAL at 13:12

## 2025-02-09 RX ADMIN — CAPSAICIN: 0.25 CREAM TOPICAL at 09:56

## 2025-02-09 RX ADMIN — SENNOSIDES 17.2 MG: 8.6 TABLET, COATED ORAL at 20:56

## 2025-02-09 RX ADMIN — SODIUM CHLORIDE, PRESERVATIVE FREE 10 ML: 5 INJECTION INTRAVENOUS at 16:45

## 2025-02-09 RX ADMIN — CHLORHEXIDINE GLUCONATE 15 ML: 1.2 RINSE ORAL at 20:52

## 2025-02-09 RX ADMIN — Medication 12.5 MG: at 06:29

## 2025-02-09 RX ADMIN — METOPROLOL TARTRATE 50 MG: 50 TABLET, FILM COATED ORAL at 09:55

## 2025-02-09 RX ADMIN — CAPSAICIN: 0.25 CREAM TOPICAL at 20:53

## 2025-02-09 RX ADMIN — SALINE NASAL SPRAY 1 SPRAY: 1.5 SOLUTION NASAL at 20:51

## 2025-02-09 RX ADMIN — Medication 2 PUFF: at 08:08

## 2025-02-09 RX ADMIN — Medication 2 PUFF: at 22:15

## 2025-02-09 RX ADMIN — SODIUM CHLORIDE, PRESERVATIVE FREE 10 ML: 5 INJECTION INTRAVENOUS at 20:52

## 2025-02-09 RX ADMIN — POLYETHYLENE GLYCOL 3350 17 G: 17 POWDER, FOR SOLUTION ORAL at 09:54

## 2025-02-09 RX ADMIN — Medication 6 MG: at 20:52

## 2025-02-09 RX ADMIN — ENOXAPARIN SODIUM 30 MG: 100 INJECTION SUBCUTANEOUS at 20:54

## 2025-02-09 RX ADMIN — ACETAMINOPHEN 1000 MG: 500 TABLET ORAL at 20:52

## 2025-02-09 RX ADMIN — METOPROLOL TARTRATE 50 MG: 50 TABLET, FILM COATED ORAL at 20:51

## 2025-02-09 RX ADMIN — TIOTROPIUM BROMIDE INHALATION SPRAY 2 PUFF: 3.12 SPRAY, METERED RESPIRATORY (INHALATION) at 08:08

## 2025-02-09 RX ADMIN — ENOXAPARIN SODIUM 30 MG: 100 INJECTION SUBCUTANEOUS at 09:54

## 2025-02-09 RX ADMIN — CHLORHEXIDINE GLUCONATE 15 ML: 1.2 RINSE ORAL at 09:56

## 2025-02-09 ASSESSMENT — PAIN SCALES - GENERAL
PAINLEVEL_OUTOF10: 0
PAINLEVEL_OUTOF10: 8
PAINLEVEL_OUTOF10: 5
PAINLEVEL_OUTOF10: 0

## 2025-02-09 ASSESSMENT — PAIN DESCRIPTION - LOCATION
LOCATION: LEG
LOCATION: HIP

## 2025-02-09 ASSESSMENT — PAIN DESCRIPTION - ORIENTATION
ORIENTATION: LEFT
ORIENTATION: LEFT

## 2025-02-09 ASSESSMENT — PAIN - FUNCTIONAL ASSESSMENT: PAIN_FUNCTIONAL_ASSESSMENT: ACTIVITIES ARE NOT PREVENTED

## 2025-02-09 ASSESSMENT — PAIN DESCRIPTION - DESCRIPTORS: DESCRIPTORS: ACHING;SHARP

## 2025-02-09 NOTE — PROGRESS NOTES
Hospitalist Progress Note      Patient:  Gabo Maldonado 84 y.o. male     : 1940  Unit/Bed:[facility]-A  Date of Admission: 2025        ASSESSMENT AND PLAN    Active Problems  #L femoral neck fracture: Ortho consulted on admission, POD8 L VALERIA. Ortho primary. PT/OT. Lovenox in-house for ppx. Deferred OAC per below.    -ASA 81mg on discharge  #New-onset AF: [id number] 3. No home AC. Patient deferred OAC at discharge given cost following risk/benefit discussion previously. Patient to follow-up with cardiology for Watchmen discussion. Continue rate-control with Lopressor 50mg bid   -Event monitor ordered for discharge  #NSVT: Short-run noted on telemetry. Electrolytes WNL. Continue telemetry, event monitor at AL  #?COPD: Per chart review, hypercapnia during admission with concern for underlying COPDe. Patient stable on RA. Albuterol inhaler prn at home. Likely would benefit from OP PFTs for further delineation of underlying pulmonary disease  Resolved Problems  #Sepsis 2/2 CAP: Right-sided consolidation noted on previous imaging, Pna pcr with Morxella. Hypotension at that time requiring ICU/pressors. Weaned/transferred to floor. Completed CTX. Remains on RA  #Acute hypercapnic respiratory failure  Chronic Conditions (reviewed and stable unless otherwise stated)  #HTN: Continue Lopressor. Lisinopril held in setting of soft bps  #Chronic diastolic HF: ECHO 25: EF 60-65%, diastolic dysfunction present. No SGLT2 - will defer to cardiology follow-up OP. Continue BB. Daily weights, I/Os  #Obesity  #CAD: Non-obstructive 201 LHC per chart. ASA at dc  #Hx pituitary adenoma: With acromegaly. Noted  #GERD: PPI    LDA: []CVC / []PICC / []Midline / []Gallardo / []Drains / []Mediport / []None  Antibiotics: No  Steroids: No  Labs (still needed?): [x]Yes / []No  IVF (still needed?): []Yes / [x]No    Level of care: []Step Down / [x]Med-Surg  Bed Status: [x]Inpatient / []Observation  Telemetry: [x]Yes / []No  PT/OT:

## 2025-02-09 NOTE — PROGRESS NOTES
Occupational Therapy  [facility] 7K  Occupational Therapy  Daily Note    Discharge Recommendations: Subacute/skilled nursing facility  Equipment Recommendations: No (continue to assess for needs)      Time In: 1144  Time Out: 1212  Timed Code Treatment Minutes: 28 Minutes  Minutes: 28          Date: 2025  Patient Name: Gabo Maldonado,   Gender: male      Room: [facility]028  MRN: [id number]  : 1940  (84 y.o.)  Referring Practitioner: Leonor Hampton PA-C  Diagnosis: Trauma  Additional Pertinent Hx: Per EMR: 84-year-old male with a past medical history of hypertension, erectile dysfunction, hyperlipidemia, pituitary macroadenoma, and COPD originally presented to [facility] after a fall down 2 concrete steps resulting in left-sided hip pain.  The patient was found to have a left femoral neck fracture for which orthopedics was consulted with plans for operative management the following day.  The patient developed strokelike symptoms that included a right-sided facial droop and right-sided ataxia progressing to the need for a stroke alert to be called.  Patient fell outside the window for TNK, and had no evidence of LVO, and improved with Narcan, so no intervention deemed necessary by neurology.  On the floor, the patient developed lethargy, and shown to be hypercapnic, so he was placed on BiPAP.  Despite this, including changes to the settings, the patient did become more hypercapnic, so he transferred to ICU for hypercapnic respiratory failure. LEFT HIP TOTAL ARTHROPLASTY  By Dr Estrada.\"    Restrictions/Precautions:  Restrictions/Precautions: Surgical Protocols, Fall Risk, Weight Bearing  Left Lower Extremity Weight Bearing: Weight Bearing As Tolerated  Position Activity Restriction  Hip Precautions: Posterior hip precautions     Social/Functional History:  Lives With: Spouse  Type of Home: House  Home Layout: Two level, Bed/Bath upstairs, 1/2 bath on main level  Home

## 2025-02-10 ENCOUNTER — APPOINTMENT (OUTPATIENT)
Age: 85
DRG: 853 | End: 2025-02-10
Payer: MEDICARE

## 2025-02-10 VITALS
RESPIRATION RATE: 18 BRPM | WEIGHT: 267.64 LBS | OXYGEN SATURATION: 97 % | HEART RATE: 80 BPM | DIASTOLIC BLOOD PRESSURE: 64 MMHG | TEMPERATURE: 97.5 F | BODY MASS INDEX: 40.56 KG/M2 | SYSTOLIC BLOOD PRESSURE: 113 MMHG | HEIGHT: 68 IN

## 2025-02-10 LAB
ANION GAP SERPL CALC-SCNC: 13 MEQ/L (ref 8–16)
BUN SERPL-MCNC: 16 MG/DL (ref 7–22)
CALCIUM SERPL-MCNC: 9.1 MG/DL (ref 8.5–10.5)
CHLORIDE SERPL-SCNC: 98 MEQ/L (ref 98–111)
CO2 SERPL-SCNC: 25 MEQ/L (ref 23–33)
CREAT SERPL-MCNC: 0.9 MG/DL (ref 0.4–1.2)
DEPRECATED RDW RBC AUTO: 48.6 FL (ref 35–45)
ECHO BSA: 2.46 M2
ERYTHROCYTE [DISTWIDTH] IN BLOOD BY AUTOMATED COUNT: 13.9 % (ref 11.5–14.5)
GFR SERPL CREATININE-BSD FRML MDRD: 83 ML/MIN/1.73M2
GLUCOSE SERPL-MCNC: 111 MG/DL (ref 70–108)
HCT VFR BLD AUTO: 35.6 % (ref 42–52)
HGB BLD-MCNC: 11.7 GM/DL (ref 14–18)
MAGNESIUM SERPL-MCNC: 2.1 MG/DL (ref 1.6–2.4)
MCH RBC QN AUTO: 31.9 PG (ref 26–33)
MCHC RBC AUTO-ENTMCNC: 32.9 GM/DL (ref 32.2–35.5)
MCV RBC AUTO: 97 FL (ref 80–94)
PLATELET # BLD AUTO: 220 THOU/MM3 (ref 130–400)
PMV BLD AUTO: 10.1 FL (ref 9.4–12.4)
POTASSIUM SERPL-SCNC: 4.6 MEQ/L (ref 3.5–5.2)
RBC # BLD AUTO: 3.67 MILL/MM3 (ref 4.7–6.1)
SODIUM SERPL-SCNC: 136 MEQ/L (ref 135–145)
WBC # BLD AUTO: 6.8 THOU/MM3 (ref 4.8–10.8)

## 2025-02-10 PROCEDURE — 36415 COLL VENOUS BLD VENIPUNCTURE: CPT

## 2025-02-10 PROCEDURE — 83735 ASSAY OF MAGNESIUM: CPT

## 2025-02-10 PROCEDURE — 6370000000 HC RX 637 (ALT 250 FOR IP): Performed by: STUDENT IN AN ORGANIZED HEALTH CARE EDUCATION/TRAINING PROGRAM

## 2025-02-10 PROCEDURE — 6370000000 HC RX 637 (ALT 250 FOR IP): Performed by: PHYSICIAN ASSISTANT

## 2025-02-10 PROCEDURE — 85027 COMPLETE CBC AUTOMATED: CPT

## 2025-02-10 PROCEDURE — 6360000002 HC RX W HCPCS: Performed by: PHYSICIAN ASSISTANT

## 2025-02-10 PROCEDURE — 99232 SBSQ HOSP IP/OBS MODERATE 35: CPT

## 2025-02-10 PROCEDURE — 80048 BASIC METABOLIC PNL TOTAL CA: CPT

## 2025-02-10 PROCEDURE — 6370000000 HC RX 637 (ALT 250 FOR IP)

## 2025-02-10 PROCEDURE — 2500000003 HC RX 250 WO HCPCS: Performed by: PHYSICIAN ASSISTANT

## 2025-02-10 PROCEDURE — 93270 REMOTE 30 DAY ECG REV/REPORT: CPT

## 2025-02-10 PROCEDURE — 94640 AIRWAY INHALATION TREATMENT: CPT

## 2025-02-10 RX ADMIN — METOPROLOL TARTRATE 50 MG: 50 TABLET, FILM COATED ORAL at 11:59

## 2025-02-10 RX ADMIN — POLYETHYLENE GLYCOL 3350 17 G: 17 POWDER, FOR SOLUTION ORAL at 09:25

## 2025-02-10 RX ADMIN — SODIUM CHLORIDE, PRESERVATIVE FREE 10 ML: 5 INJECTION INTRAVENOUS at 09:26

## 2025-02-10 RX ADMIN — CAPSAICIN: 0.25 CREAM TOPICAL at 09:24

## 2025-02-10 RX ADMIN — ACETAMINOPHEN 1000 MG: 500 TABLET ORAL at 06:36

## 2025-02-10 RX ADMIN — SALINE NASAL SPRAY 1 SPRAY: 1.5 SOLUTION NASAL at 09:25

## 2025-02-10 RX ADMIN — Medication 2 PUFF: at 06:00

## 2025-02-10 RX ADMIN — CHLORHEXIDINE GLUCONATE 15 ML: 1.2 RINSE ORAL at 09:26

## 2025-02-10 RX ADMIN — ENOXAPARIN SODIUM 30 MG: 100 INJECTION SUBCUTANEOUS at 09:24

## 2025-02-10 RX ADMIN — Medication 12.5 MG: at 06:42

## 2025-02-10 RX ADMIN — TIOTROPIUM BROMIDE INHALATION SPRAY 2 PUFF: 3.12 SPRAY, METERED RESPIRATORY (INHALATION) at 05:59

## 2025-02-10 ASSESSMENT — PAIN - FUNCTIONAL ASSESSMENT: PAIN_FUNCTIONAL_ASSESSMENT: ACTIVITIES ARE NOT PREVENTED

## 2025-02-10 ASSESSMENT — PAIN SCALES - GENERAL
PAINLEVEL_OUTOF10: 0
PAINLEVEL_OUTOF10: 3
PAINLEVEL_OUTOF10: 0

## 2025-02-10 ASSESSMENT — PAIN DESCRIPTION - LOCATION: LOCATION: HIP

## 2025-02-10 ASSESSMENT — PAIN DESCRIPTION - ORIENTATION: ORIENTATION: LEFT

## 2025-02-10 ASSESSMENT — PAIN DESCRIPTION - DESCRIPTORS: DESCRIPTORS: ACHING;DULL;DISCOMFORT

## 2025-02-10 NOTE — PROGRESS NOTES
Orthopaedic Progress Note      SUBJECTIVE   Mr. Maldonado is post op day # 10 L VALERIA    Seen at bedside this morning  no adverse events overnight  Sleeping well  +BM  tolerating oral diet  Denies any numbness/paresthesia in operative extremity  Good efforts PT OT  pain control stable       OBJECTIVE      Physical    VITALS:  /61   Pulse 72   Temp 97.5 °F (36.4 °C) (Oral)   Resp 20   Ht 1.727 m (5' 8\")   Wt 121.5 kg (267 lb 13.7 oz)   SpO2 95%   BMI 40.73 kg/m²   I/O last 3 completed shifts:  In: 1290 [P.O.:1290]  Out: 3300 [Urine:3300]    4/10 pain  Gen: alert and oriented  Head: normorcephalic, atraumatic  Resp: unlabored, room air  Pelvis: stable  LLE prineo intact, no drainage  Sensation to light touch intact  Gastroc TA EHL intact  Distal pulses palpable, warm well perfused  Calf supple nontender to palpation       Data  CBC:   Lab Results   Component Value Date/Time    WBC 6.7 02/09/2025 06:43 AM    HGB 12.2 02/09/2025 06:43 AM     02/09/2025 06:43 AM     BMP:    Lab Results   Component Value Date/Time     02/09/2025 06:43 AM    K 4.4 02/09/2025 06:43 AM    K 5.3 10/19/2021 08:48 AM    CL 98 02/09/2025 06:43 AM    CO2 28 02/09/2025 06:43 AM    BUN 17 02/09/2025 06:43 AM    CREATININE 0.8 02/09/2025 06:43 AM    CALCIUM 9.0 02/09/2025 06:43 AM    GLUCOSE 114 02/09/2025 06:43 AM     Uric Acid:  No components found for: \"URIC\"  PT/INR:    Lab Results   Component Value Date/Time    INR 1.21 01/31/2025 03:28 AM     Troponin:  No results found for: \"TROPONINI\"  Urine Culture:  No components found for: \"CURINE\"      Current Inpatient Medications    Current Facility-Administered Medications: enoxaparin Sodium (LOVENOX) injection 30 mg, 30 mg, SubCUTAneous, BID  naloxegol (MOVANTIK) tablet 12.5 mg, 12.5 mg, Oral, QAM AC  senna (SENOKOT) tablet 17.2 mg, 2 tablet, Oral, Nightly  capsaicin (ZOSTRIX) 0.025 % cream, , Topical, BID  acetaminophen (TYLENOL) tablet 1,000 mg, 1,000 mg, Oral, Q6H

## 2025-02-10 NOTE — PROGRESS NOTES
range of motion. Trachea midline.  No gross JVD appreciated.  Respiratory:  Normal effort. Clear to auscultation, without rales or wheezes or rhonchi.  Cardiovascular: Normal rate, regular rhythm with normal S1/S2 without murmurs.    No lower extremity edema.   Abdomen: Soft, non-tender, non-distended with normal bowel sounds.  Musculoskeletal: No joint swelling or tenderness. Normal tone. No abnormal movements.   Skin: Warm and dry. No rashes or lesions.  Neurologic:  No focal sensory/motor deficits in the upper or lower extremities. Cranial nerves:  grossly non-focal 2-12.     Psychiatric: Alert and oriented, normal insight and thought content.   Capillary Refill: Brisk,< 3 seconds.  Peripheral Pulses: +2 palpable, equal bilaterally.       Labs/Radiology: See chart or assessment above.     Electronically signed by Adrian Weathers MD on 2/10/2025 at 9:23 AM

## 2025-02-10 NOTE — CARE COORDINATION
02/03/25 7:32 AM    Pt transferred to 4K18. Handoff report given to KAYLAN Nj CM.   
1/31/25, 3:42 PM EST    DISCHARGE ON GOING EVALUATION    Gabo W [facility] day: 2  Location: -09/009-A Reason for admit: Heart murmur [R01.1]  Trauma [T14.90XA]  Left hip pain [M25.552]  Inability to ambulate due to hip [R26.2]  Fall, initial encounter [W19.XXXA]  Closed fracture of neck of left femur, initial encounter ([facility]) [S72.002A]     Procedures:   1/30 Intubated  1/30 CVC right  1/31 LEFT HIP TOTAL ARTHROPLASTY w/Dr Estrada  1/31 Extubated    Imaging since last note:   1/30 MRI Brain: No significant abnormalities.   1/31 CXR: No interval change since previous study dated 1/30/2025..     Barriers to Discharge: Transferred to ICU yesterday and was intubated d/t decreased LOC/increased CO2 despite bipap. Taken to surgery today for left hip total arthroplasty w/Dr Estrada. Extubated this afternoon to 2L O2. Afebrile. Afib 80's. WBAT LLE. SLP/PT/OT. Ortho, Intensivist, Neurology, and Neurosurgery following. Telemetry, CVC, wound care, external urinary catheter, SCDs. Levo @ 5 mcg/min, IV zithromax, inhaler, IV rocephin, IV solucortef 100 mg Q8H, prn IV morphine, prn roxicodone, IV protonix, Electrolyte replacement protocols.     PCP: Pedro Escobedo DO  Readmission Risk Score: 16.6    Patient Goals/Plan/Treatment Preferences: From home w/wife. Independent PTA. Has walker and lift chair. Monitor for needs post-op/post-extubation; therapy to eval.     
2/10/25, 9:06 AM EST    DISCHARGE PLANNING EVALUATION    Precert approved for [facility], can accept today.  Transport available at 1:00 pm.  Discussed with patient, who is in agreement and plans to contact family himself.   Confirmed with [facility].     2/10/25, 11:04 AM EST    Patient goals/plan/ treatment preferences discussed by  and .  Patient goals/plan/ treatment preferences reviewed with patient/ family.  Patient/ family verbalize understanding of discharge plan and are in agreement with goal/plan/treatment preferences.  Understanding was demonstrated using the teach back method.  AVS provided by RN at time of discharge, which includes all necessary medical information pertaining to the patients current course of illness, treatment, post-discharge goals of care, and treatment preferences.     Services At/After Discharge: Skilled Nursing Facility (SNF) and In ambulance            
2/3/25, 9:01 AM EST    DISCHARGE ON GOING EVALUATION    Gabo K[facility]OUGH [facility] day: 5  Location: [facility]18/018 Reason for admit: Heart murmur [R01.1]  Trauma [T14.90XA]  Left hip pain [M25.552]  Inability to ambulate due to hip [R26.2]  Fall, initial encounter [W19.XXXA]  Closed fracture of neck of left femur, initial encounter ([facility]) [S72.002A]     Procedures:   1/31  Total Arthroplasty  Barriers to Discharge:   From ICU (ventilator, pressors there)  Trauma/Fall w Left Femur Fracture  PMH: A-fib, COPD  Pneumonia Panel/Moraxella Catarrhalis  IV Rocephin  IV PPI  Await Therapy Recommendations  PCP: Pedro Escobedo,   Readmission Risk Score: 16.5  Patient Goals/Plan/Treatment Preferences:   Lives w spouse, tele-sitter in room, not safe for home w x2 assist, has RW, SW referral for SNF (precert), not IPR candidate per [place] Coordinator    
2/4/25, 2:08 PM EST    DISCHARGE ON GOING EVALUATION    Gabo W [facility] day: 6  Location: [facility]28/028-A Reason for admit: Heart murmur [R01.1]  Trauma [T14.90XA]  Left hip pain [M25.552]  Inability to ambulate due to hip [R26.2]  Fall, initial encounter [W19.XXXA]  Closed fracture of neck of left femur, initial encounter ([facility]) [S72.002A]     Procedures:   1/31  Total Arthroplasty     Imaging since last note:   US abdomen planned.     Barriers to Discharge: Transfer from 4K. POD 4. PT/OT. Hospitalist and ortho following. Inhaler. Lovenox. Movantik. Pain control. IV rocephin. Completed.     PCP: Pedro Escobedo,   Readmission Risk Score: 16.8    Patient Goals/Plan/Treatment Preferences: Await acceptance to SNF.     
2/5/25, 8:50 AM EST    DISCHARGE PLANNING EVALUATION    Planning [facility] at discharge, precert started 2/5  
2/6/25, 1:26 PM EST    DISCHARGE PLANNING EVALUATION    [facility]    
2/7/25, 2:46 PM EST    DISCHARGE PLANNING EVALUATION    Precert for [facility]   
CM Note  Client w 7K Transfer: Hand-Off given to AURELIO Serna CM  Electronically signed by Guerrero Green RN on 2/4/2025 at 6:39 AM    
Case Management Assessment Initial Evaluation    Date/Time of Evaluation: 2025 8:14 AM  Assessment Completed by: Daphne Mckinnon RN    If patient is discharged prior to next notation, then this note serves as note for discharge by case management.    Patient Name: Gabo Maldonado                   YOB: 1940  Diagnosis: Heart murmur [R01.1]  Trauma [T14.90XA]  Left hip pain [M25.552]  Inability to ambulate due to hip [R26.2]  Fall, initial encounter [W19.XXXA]  Closed fracture of neck of left femur, initial encounter ([facility]) [S72.002A]                   Date / Time: 2025  9:27 PM  Location: [facility]     Patient Admission Status: Inpatient   Readmission Risk Low 0-14, Mod 15-19), High > 20: Readmission Risk Score: 12.2    Current PCP: Pedro Escobedo,   Health Care Decision Makers:   Primary Decision Maker: Deana El - Child - [phone]    Secondary Decision Maker: Shayy Garrett - Child - [phone]    Supplemental (Other) Decision Maker: Isaac Joel - Child - [phone]    Additional Case Management Notes: Presented post fall. Trauma, hospitalist, and ortho following. Planning OR when cleared. MRI ordered, r/t code stroke, have been unable to obtain.Requiring continuous bipap. IVF. Pain control. Echo ordered. Code stroke.     Procedures: none    Imagin/28 CT pelvis: Nondisplaced impacted left femoral neck fracture.    CTA head: Patent cerebral and basilar arterial vasculature, without significant   stenosis. No large vessel occlusion.    CTA neck:   1. Patent major neck arterial vasculature, without significant stenosis.  2. Mild ectatic proximal aortic arch up to 3.9 cm.   CTA chest:   1. Negative for acute pulmonary embolism.   2. Mild dilated central pulmonary arteries, could suggest pulmonary   arterial hypertension.    CT head:   1. No acute intracranial abnormality.  2. Global cerebral volume loss and chronic microvascular ischemic 
DISCHARGE PLANNING EVALUATION  2/4/25, 11:22 AM EST    Reason for Referral: snf for rehab    Decision Maker: makes own decisions, daughter is POA  Current Services: none   New Services Requested: snf for rehab   Family/ Social/ Home environment: patient lives at home with his wife, wife is able to assist with care once patient is more independent  Payment Source:[employer] medicare   Transportation at Discharge: undetermined   Post-acute (PAC) provider list was provided to patient. Patient was informed of their freedom to choose PAC provider. Discussed and offered to show the patient the relevant PAC Providers quality and resource use measures on Medicare Compare web site via computer based on patient's goals of care and treatment preferences. Questions regarding selection process were answered.      Teach Back Method used with patient and wife regarding care plan and discharge plan  Patient and wife verbalized understanding of the plan of care and contribute to goal setting.       Patient preferences and discharge plan: spoke with patient and wife.  They request [facility], referral made, semi-private room available.   [facility] has met with family and they are in agreement with semi-private room.      Electronically signed by EUGENIO Rebollar on 2/4/2025 at 11:22 AM         
never intubated

## 2025-02-10 NOTE — PLAN OF CARE
Problem: Discharge Planning  Goal: Discharge to home or other facility with appropriate resources  1/31/2025 0827 by Shanelle Pop RN  Outcome: Progressing  Flowsheets (Taken 1/31/2025 0827)  Discharge to home or other facility with appropriate resources: Identify barriers to discharge with patient and caregiver     Problem: Pain  Goal: Verbalizes/displays adequate comfort level or baseline comfort level  1/31/2025 0827 by Shanelle Pop RN  Outcome: Progressing  Flowsheets  Taken 1/31/2025 0827 by Shanelle Pop RN  Verbalizes/displays adequate comfort level or baseline comfort level:   Encourage patient to monitor pain and request assistance   Implement non-pharmacological measures as appropriate and evaluate response   Assess pain using appropriate pain scale   Consider cultural and social influences on pain and pain management   Administer analgesics based on type and severity of pain and evaluate response   Notify Licensed Independent Practitioner if interventions unsuccessful or patient reports new pain  Taken 1/31/2025 0030 by Shivani Price RN  Verbalizes/displays adequate comfort level or baseline comfort level:   Encourage patient to monitor pain and request assistance   Assess pain using appropriate pain scale     Problem: Safety - Adult  Goal: Free from fall injury  1/31/2025 0827 by Shanelle Pop RN  Outcome: Progressing  Flowsheets (Taken 1/31/2025 0827)  Free From Fall Injury: Instruct family/caregiver on patient safety     Problem: Neurosensory - Adult  Goal: Achieves stable or improved neurological status  1/31/2025 0827 by Shanelle Pop RN  Outcome: Progressing  Flowsheets (Taken 1/31/2025 0827)  Achieves stable or improved neurological status: Assess for and report changes in neurological status     Problem: Neurosensory - Adult  Goal: Achieves maximal functionality and self care  1/31/2025 0827 by Shanelle Pop RN  Outcome: Progressing     Problem: Respiratory - 
  Problem: Discharge Planning  Goal: Discharge to home or other facility with appropriate resources  1/31/2025 2058 by Bryce Pandey RN  Outcome: Progressing  Flowsheets (Taken 1/31/2025 2058)  Discharge to home or other facility with appropriate resources:   Identify barriers to discharge with patient and caregiver   Identify discharge learning needs (meds, wound care, etc)   Arrange for needed discharge resources and transportation as appropriate     Problem: Pain  Goal: Verbalizes/displays adequate comfort level or baseline comfort level  1/31/2025 2058 by Bryce Pandey RN  Outcome: Progressing  Flowsheets (Taken 1/31/2025 2058)  Verbalizes/displays adequate comfort level or baseline comfort level:   Encourage patient to monitor pain and request assistance   Administer analgesics based on type and severity of pain and evaluate response   Assess pain using appropriate pain scale     Problem: Safety - Adult  Goal: Free from fall injury  1/31/2025 2058 by Bryce Pandey RN  Outcome: Progressing  Flowsheets (Taken 1/31/2025 2058)  Free From Fall Injury: Instruct family/caregiver on patient safety     Problem: Neurosensory - Adult  Goal: Achieves stable or improved neurological status  1/31/2025 2058 by Bryce Pandey RN  Outcome: Progressing     Problem: Neurosensory - Adult  Goal: Achieves maximal functionality and self care  1/31/2025 2058 by Bryce Pandey RN  Outcome: Progressing     Problem: Respiratory - Adult  Goal: Achieves optimal ventilation and oxygenation  1/31/2025 2058 by Bryce Pandey RN  Outcome: Progressing     Problem: Cardiovascular - Adult  Goal: Absence of cardiac dysrhythmias or at baseline  1/31/2025 2058 by Bryce Pandey RN  Outcome: Progressing     Problem: Skin/Tissue Integrity - Adult  Goal: Skin integrity remains intact  Description: 1.  Monitor for areas of redness and/or skin breakdown  2.  Assess vascular access sites hourly  3.  Every 4-6 hours minimum:  Change oxygen 
  Problem: Discharge Planning  Goal: Discharge to home or other facility with appropriate resources  2/1/2025 0948 by Ignacia Fabian RN  Outcome: Progressing  Flowsheets (Taken 2/1/2025 0741)  Discharge to home or other facility with appropriate resources:   Identify barriers to discharge with patient and caregiver   Arrange for needed discharge resources and transportation as appropriate     Problem: Pain  Goal: Verbalizes/displays adequate comfort level or baseline comfort level  2/1/2025 0948 by Ignacia Fabian RN  Outcome: Progressing  Flowsheets (Taken 2/1/2025 0741)  Verbalizes/displays adequate comfort level or baseline comfort level:   Encourage patient to monitor pain and request assistance   Assess pain using appropriate pain scale     Problem: Safety - Adult  Goal: Free from fall injury  2/1/2025 0948 by Ignacia Fabian RN  Outcome: Progressing  Free From Fall Injury: Instruct family/caregiver on patient safety     Problem: Neurosensory - Adult  Goal: Achieves stable or improved neurological status  2/1/2025 0948 by Ignacia Fabian RN  Outcome: Progressing  Flowsheets (Taken 2/1/2025 0741)  Achieves stable or improved neurological status:   Assess for and report changes in neurological status   Initiate measures to prevent increased intracranial pressure     Problem: Neurosensory - Adult  Goal: Achieves maximal functionality and self care  2/1/2025 0948 by Ignacia Fabian RN  Outcome: Progressing  Flowsheets (Taken 2/1/2025 0741)  Achieves maximal functionality and self care: Monitor swallowing and airway patency with patient fatigue and changes in neurological status     Problem: Respiratory - Adult  Goal: Achieves optimal ventilation and oxygenation  2/1/2025 0948 by Ignacia Fabian RN  Outcome: Progressing  Flowsheets (Taken 2/1/2025 0741)  Achieves optimal ventilation and oxygenation:   Assess for changes in respiratory status   Assess for changes in mentation and behavior     Problem: 
  Problem: Discharge Planning  Goal: Discharge to home or other facility with appropriate resources  2/1/2025 1608 by Ann Langford RN  Outcome: Progressing  Flowsheets (Taken 2/1/2025 1608)  Discharge to home or other facility with appropriate resources:   Identify barriers to discharge with patient and caregiver   Identify discharge learning needs (meds, wound care, etc)   Refer to discharge planning if patient needs post-hospital services based on physician order or complex needs related to functional status, cognitive ability or social support system   Arrange for needed discharge resources and transportation as appropriate   Arrange for interpreters to assist at discharge as needed  Note:  Patient working with social work for discharge planning.        Problem: Pain  Goal: Verbalizes/displays adequate comfort level or baseline comfort level  2/1/2025 1608 by Ann Langford RN  Outcome: Progressing  Flowsheets (Taken 2/1/2025 1608)  Verbalizes/displays adequate comfort level or baseline comfort level:   Encourage patient to monitor pain and request assistance   Administer analgesics based on type and severity of pain and evaluate response   Consider cultural and social influences on pain and pain management   Assess pain using appropriate pain scale   Implement non-pharmacological measures as appropriate and evaluate response   Notify Licensed Independent Practitioner if interventions unsuccessful or patient reports new pain  Note: Pain Assessment: 0-10  Pain Level: 2   Patient's Stated Pain Goal: 0 - No pain   Is pain goal met at this time?  No     Non-Pharmaceutical Pain Intervention(s): Rest       Problem: Safety - Adult  Goal: Free from fall injury  2/1/2025 1608 by Ann Langford RN  Outcome: Progressing  Flowsheets (Taken 2/1/2025 1608)  Free From Fall Injury:   Instruct family/caregiver on patient safety   Based on caregiver fall risk screen, instruct family/caregiver to ask for assistance with 
  Problem: Discharge Planning  Goal: Discharge to home or other facility with appropriate resources  2/1/2025 2309 by Itzel Dubon, RN  Outcome: Progressing  Flowsheets (Taken 2/1/2025 2309)  Discharge to home or other facility with appropriate resources:   Identify barriers to discharge with patient and caregiver   Arrange for needed discharge resources and transportation as appropriate   Identify discharge learning needs (meds, wound care, etc)   Refer to discharge planning if patient needs post-hospital services based on physician order or complex needs related to functional status, cognitive ability or social support system     Problem: Pain  Goal: Verbalizes/displays adequate comfort level or baseline comfort level  2/1/2025 2309 by Itzel Dubon RN  Outcome: Progressing  Flowsheets (Taken 2/1/2025 2309)  Verbalizes/displays adequate comfort level or baseline comfort level:   Encourage patient to monitor pain and request assistance   Assess pain using appropriate pain scale   Administer analgesics based on type and severity of pain and evaluate response   Implement non-pharmacological measures as appropriate and evaluate response   Consider cultural and social influences on pain and pain management   Notify Licensed Independent Practitioner if interventions unsuccessful or patient reports new pain     Problem: Safety - Adult  Goal: Free from fall injury  2/1/2025 2309 by Itzel Dubon RN  Outcome: Progressing  Flowsheets (Taken 2/1/2025 2309)  Free From Fall Injury: Instruct family/caregiver on patient safety     Problem: Neurosensory - Adult  Goal: Achieves stable or improved neurological status  2/1/2025 2309 by Itzel Dubon, RN  Outcome: Progressing  Flowsheets (Taken 2/1/2025 2309)  Achieves stable or improved neurological status: Assess for and report changes in neurological status     Problem: Neurosensory - Adult  Goal: Achieves maximal functionality and self care  2/1/2025 2309 by Ling 
  Problem: Discharge Planning  Goal: Discharge to home or other facility with appropriate resources  2/10/2025 1331 by Whit Zepeda LPN  Outcome: Completed     Problem: Pain  Goal: Verbalizes/displays adequate comfort level or baseline comfort level  2/10/2025 1331 by Whit Zepeda LPN  Outcome: Completed     Problem: Safety - Adult  Goal: Free from fall injury  2/10/2025 1331 by Whit Zepeda LPN  Outcome: Completed     Problem: Neurosensory - Adult  Goal: Achieves stable or improved neurological status  2/10/2025 1331 by Whit Zepeda LPN  Outcome: Completed     Problem: Neurosensory - Adult  Goal: Achieves maximal functionality and self care  2/10/2025 1331 by Whit Zepeda LPN  Outcome: Completed     Problem: Respiratory - Adult  Goal: Achieves optimal ventilation and oxygenation  2/10/2025 1331 by Whit Zepeda LPN  Outcome: Completed     Problem: Cardiovascular - Adult  Goal: Absence of cardiac dysrhythmias or at baseline  2/10/2025 1331 by Whit Zepeda LPN  Outcome: Completed     Problem: Skin/Tissue Integrity - Adult  Goal: Skin integrity remains intact  Description: 1.  Monitor for areas of redness and/or skin breakdown  2.  Assess vascular access sites hourly  3.  Every 4-6 hours minimum:  Change oxygen saturation probe site  4.  Every 4-6 hours:  If on nasal continuous positive airway pressure, respiratory therapy assess nares and determine need for appliance change or resting period  2/10/2025 1331 by Whit Zepeda LPN  Outcome: Completed     Problem: Musculoskeletal - Adult  Goal: Return mobility to safest level of function  2/10/2025 1331 by Whit Zepeda LPN  Outcome: Completed     Problem: Musculoskeletal - Adult  Goal: Maintain proper alignment of affected body part  2/10/2025 1331 by Whit Zepeda LPN  Outcome: Completed     Problem: Musculoskeletal - Adult  Goal: Return ADL status to a safe level of function  2/10/2025 1331 by Whit Zepeda LPN  Outcome: Completed     Problem: Skin/Tissue Integrity  Goal: 
  Problem: Discharge Planning  Goal: Discharge to home or other facility with appropriate resources  2/4/2025 2226 by Denise Hurtado, RN  Outcome: Progressing  Flowsheets (Taken 2/3/2025 1428 by Elke Otoole RN)  Discharge to home or other facility with appropriate resources:   Identify barriers to discharge with patient and caregiver   Arrange for needed discharge resources and transportation as appropriate   Identify discharge learning needs (meds, wound care, etc)     Problem: Pain  Goal: Verbalizes/displays adequate comfort level or baseline comfort level  Outcome: Progressing  Flowsheets (Taken 2/4/2025 0141 by Manish Del Rio, RN)  Verbalizes/displays adequate comfort level or baseline comfort level: Administer analgesics based on type and severity of pain and evaluate response     Problem: Safety - Adult  Goal: Free from fall injury  Outcome: Progressing  Flowsheets (Taken 2/3/2025 1428 by Elke Otoole RN)  Free From Fall Injury:   Instruct family/caregiver on patient safety   Based on caregiver fall risk screen, instruct family/caregiver to ask for assistance with transferring infant if caregiver noted to have fall risk factors     Problem: Neurosensory - Adult  Goal: Achieves stable or improved neurological status  Outcome: Progressing  Flowsheets (Taken 2/3/2025 1428 by Elke Otoole RN)  Achieves stable or improved neurological status:   Assess for and report changes in neurological status   Initiate measures to prevent increased intracranial pressure     Problem: Neurosensory - Adult  Goal: Achieves maximal functionality and self care  Outcome: Progressing  Flowsheets (Taken 2/3/2025 1428 by Elke Otoole RN)  Achieves maximal functionality and self care:   Monitor swallowing and airway patency with patient fatigue and changes in neurological status   Encourage and assist patient to increase activity and self care with guidance from physical therapy/occupational therapy   
  Problem: Discharge Planning  Goal: Discharge to home or other facility with appropriate resources  2/8/2025 0643 by Chani Freedman RN  Outcome: Progressing  2/7/2025 1751 by Hiral Villanueva LPN  Outcome: Progressing  Flowsheets (Taken 2/7/2025 1751)  Discharge to home or other facility with appropriate resources:   Identify barriers to discharge with patient and caregiver   Arrange for needed discharge resources and transportation as appropriate     Problem: Pain  Goal: Verbalizes/displays adequate comfort level or baseline comfort level  2/8/2025 0643 by Chani Freedman RN  Outcome: Progressing  2/7/2025 1751 by Hiral Villanueva LPN  Outcome: Progressing  Flowsheets (Taken 2/7/2025 1751)  Verbalizes/displays adequate comfort level or baseline comfort level:   Assess pain using appropriate pain scale   Implement non-pharmacological measures as appropriate and evaluate response   Administer analgesics based on type and severity of pain and evaluate response   Encourage patient to monitor pain and request assistance     Problem: Safety - Adult  Goal: Free from fall injury  2/8/2025 0643 by Chani Freedman RN  Outcome: Progressing  2/7/2025 1751 by Hiral Villanueva LPN  Outcome: Progressing  Flowsheets (Taken 2/7/2025 1751)  Free From Fall Injury: Instruct family/caregiver on patient safety     Problem: Neurosensory - Adult  Goal: Achieves stable or improved neurological status  2/7/2025 1751 by Hiral Villanueva LPN  Outcome: Progressing  Flowsheets (Taken 2/7/2025 1751)  Achieves stable or improved neurological status: Assess for and report changes in neurological status  Goal: Achieves maximal functionality and self care  2/7/2025 1751 by Hiral Villanueva LPN  Outcome: Progressing     Problem: Respiratory - Adult  Goal: Achieves optimal ventilation and oxygenation  2/8/2025 0612 by Yunier Traore RCP  Outcome: Progressing  Flowsheets (Taken 2/8/2025 0612)  Achieves optimal ventilation and oxygenation:   Assess 
  Problem: Discharge Planning  Goal: Discharge to home or other facility with appropriate resources  Outcome: Progressing     Problem: Pain  Goal: Verbalizes/displays adequate comfort level or baseline comfort level  Outcome: Progressing     Problem: Safety - Adult  Goal: Free from fall injury  Outcome: Progressing     Problem: Respiratory - Adult  Goal: Achieves optimal ventilation and oxygenation  2/5/2025 2121 by Yunier Traore RCP  Outcome: Progressing  Flowsheets (Taken 2/4/2025 0102)  Achieves optimal ventilation and oxygenation:   Assess for changes in respiratory status   Position to facilitate oxygenation and minimize respiratory effort   Respiratory therapy support as indicated   Assess for changes in mentation and behavior   Assess and instruct to report shortness of breath or any respiratory difficulty     Problem: Cardiovascular - Adult  Goal: Absence of cardiac dysrhythmias or at baseline  Outcome: Progressing     Problem: Skin/Tissue Integrity - Adult  Goal: Skin integrity remains intact  Description: 1.  Monitor for areas of redness and/or skin breakdown  2.  Assess vascular access sites hourly  3.  Every 4-6 hours minimum:  Change oxygen saturation probe site  4.  Every 4-6 hours:  If on nasal continuous positive airway pressure, respiratory therapy assess nares and determine need for appliance change or resting period  Outcome: Progressing     Problem: Skin/Tissue Integrity  Goal: Skin integrity remains intact  Description: 1.  Monitor for areas of redness and/or skin breakdown  2.  Assess vascular access sites hourly  3.  Every 4-6 hours minimum:  Change oxygen saturation probe site  4.  Every 4-6 hours:  If on nasal continuous positive airway pressure, respiratory therapy assess nares and determine need for appliance change or resting period  Outcome: Progressing     
  Problem: Discharge Planning  Goal: Discharge to home or other facility with appropriate resources  Outcome: Progressing  Flowsheets  Taken 1/30/2025 0143 by Farideh Cline RN  Discharge to home or other facility with appropriate resources:   Identify barriers to discharge with patient and caregiver   Identify discharge learning needs (meds, wound care, etc)   Arrange for needed discharge resources and transportation as appropriate  Taken 1/29/2025 1714 by Mehnaz Antoine RN  Discharge to home or other facility with appropriate resources:   Arrange for interpreters to assist at discharge as needed   Arrange for needed discharge resources and transportation as appropriate   Identify barriers to discharge with patient and caregiver     Problem: Pain  Goal: Verbalizes/displays adequate comfort level or baseline comfort level  Outcome: Progressing  Flowsheets (Taken 1/30/2025 0143)  Verbalizes/displays adequate comfort level or baseline comfort level:   Encourage patient to monitor pain and request assistance   Assess pain using appropriate pain scale   Administer analgesics based on type and severity of pain and evaluate response   Consider cultural and social influences on pain and pain management   Implement non-pharmacological measures as appropriate and evaluate response   Notify Licensed Independent Practitioner if interventions unsuccessful or patient reports new pain     Problem: Safety - Adult  Goal: Free from fall injury  Outcome: Progressing  Flowsheets (Taken 1/30/2025 0143)  Free From Fall Injury:   Based on caregiver fall risk screen, instruct family/caregiver to ask for assistance with transferring infant if caregiver noted to have fall risk factors   Instruct family/caregiver on patient safety     Problem: Neurosensory - Adult  Goal: Achieves stable or improved neurological status  Outcome: Progressing  Flowsheets (Taken 1/30/2025 0143)  Achieves stable or improved neurological status: Assess for and 
  Problem: Discharge Planning  Goal: Discharge to home or other facility with appropriate resources  Outcome: Progressing  Flowsheets (Taken 1/30/2025 2000)  Discharge to home or other facility with appropriate resources: Identify barriers to discharge with patient and caregiver     Problem: Pain  Goal: Verbalizes/displays adequate comfort level or baseline comfort level  Outcome: Progressing  Flowsheets (Taken 1/30/2025 2000)  Verbalizes/displays adequate comfort level or baseline comfort level:   Encourage patient to monitor pain and request assistance   Assess pain using appropriate pain scale     Problem: Safety - Adult  Goal: Free from fall injury  Outcome: Progressing  Flowsheets (Taken 1/30/2025 0143 by Farideh Cline, RN)  Free From Fall Injury:   Based on caregiver fall risk screen, instruct family/caregiver to ask for assistance with transferring infant if caregiver noted to have fall risk factors   Instruct family/caregiver on patient safety     Problem: Neurosensory - Adult  Goal: Achieves stable or improved neurological status  Outcome: Progressing  Flowsheets (Taken 1/30/2025 2000)  Achieves stable or improved neurological status: Assess for and report changes in neurological status  Goal: Achieves maximal functionality and self care  Outcome: Progressing  Flowsheets (Taken 1/30/2025 2000)  Achieves maximal functionality and self care: Monitor swallowing and airway patency with patient fatigue and changes in neurological status     Problem: Respiratory - Adult  Goal: Achieves optimal ventilation and oxygenation  Outcome: Progressing  Flowsheets (Taken 1/30/2025 2000)  Achieves optimal ventilation and oxygenation:   Assess for changes in respiratory status   Oxygen supplementation based on oxygen saturation or arterial blood gases   Respiratory therapy support as indicated     Problem: Cardiovascular - Adult  Goal: Absence of cardiac dysrhythmias or at baseline  Outcome: Progressing  Flowsheets (Taken 
  Problem: Respiratory - Adult  Goal: Achieves optimal ventilation and oxygenation  1/31/2025 1936 by Rosi Moon RCP  Outcome: Progressing  Flowsheets (Taken 1/31/2025 1240)  Achieves optimal ventilation and oxygenation:   Assess for changes in respiratory status   Respiratory therapy support as indicated  1/31/2025 0827 by Shanelle Pop, RN  Outcome: Progressing  Flowsheets (Taken 1/31/2025 0827)  Achieves optimal ventilation and oxygenation:   Assess for changes in respiratory status   Assess for changes in mentation and behavior   Position to facilitate oxygenation and minimize respiratory effort   Oxygen supplementation based on oxygen saturation or arterial blood gases   Assess the need for suctioning and aspirate as needed   Assess and instruct to report shortness of breath or any respiratory difficulty   Respiratory therapy support as indicated   Encourage broncho-pulmonary hygiene including cough, deep breathe, incentive spirometry   Patient lung sounds are considered normal for their current lung condition. No signs of distress noted. Current treatment regimen appropriate   
  Problem: Respiratory - Adult  Goal: Achieves optimal ventilation and oxygenation  2/1/2025 0857 by Maria Luz Cruz RCP  Outcome: Progressing     
  Problem: Respiratory - Adult  Goal: Clear lung sounds  Outcome: Progressing    Continue tx's to improve breath sounds, increase aeration and decrease WOB.  
  Problem: Respiratory - Adult  Goal: Clear lung sounds  Outcome: Progressing    Continue tx's to improve breath sounds, increase aeration and decresae WOB.  
Decreased level of awareness     CTH negative   CTA head and neck negative  MRI brain negative for acute stroke.     Recommend primary team investigate other etiologies that could explain the patients symptoms      No further inpatient neurologic work-up at this time.  Neurology will sign off.  Please do not hesitate to call our service if there are any questions or concerns.     This case was discussed with Dr. Conroy and he is in agreement with the assessment and plan.    Electronically signed by KILEY BAILEY CNP on 1/31/25 at 11:14 AM EST   
Gabo Maldonado     CC: S/p left femur fracture  HPI: 84-year-old male with a past medical history of ataxic gait, hypertension, hyperlipidemia who presented to [facility] after fall without loss of consciousness or head trauma on 2025.  Found to have nondisplaced left femoral neck fracture.  S/p left hemiarthroplasty.  Patient had new onset A-fib during this admission.  Other PMH: Pituitary macroadenoma, herpes zoster    Home meds: Albuterol 2 puffs every 6 hours as needed, lisinopril 20 mg daily, metoprolol 50 mg daily    Allergies:  Dilaudid [hydromorphone hcl] and Hydrocodone-acetaminophen.  Surgical HX: Cardiac catheterization, cholecystectomy, hernia repair  Family HX: Heart disease, arthritis, hypertension, stroke    Social HX: Former smoker    Subjective + overnight:  Vitals: /78   Pulse 88   Temp 97.9 °F (36.6 °C) (Oral)   Resp 18   Ht 1.727 m (5' 8\")   Wt 121.9 kg (268 lb 11.9 oz)   SpO2 95%   BMI 40.86 kg/m² .   24 HR INTAKE/OUTPUT:    Intake/Output Summary (Last 24 hours) at 2025 0808  Last data filed at 2025 0610  Gross per 24 hour   Intake 940 ml   Output 1440 ml   Net -500 ml       Physical exam:  Labs:  Na: 141. K: 4.0. Cl: 105. Bicarb: 25. BUN: 19. Cr: 0.8. Glu: 106. Ca: 9.1. iCal: 1.33. M.9.  CBC - WBC: 7.8. HGB: 12.4. HCT: 36.6. PLT: 146.  Imaging:  Micro: Respiratory culture shows moderate gram-positive cocci occurring singly and in pairs, rare gram-negative bacilli.  Pneumonia panel positive for Moraxella  EKG: Atrial fibrillation 2025    Summary statement: 84-year-old male with a past medical history of ataxic gait, hypertension who presented to [facility] after fall, s/p left hemiarthroplasty.  Internal medicine has been consulted to manage medical conditions in the setting of new onset atrial fibrillation.    A&P:   New onset A-fib: [id number] 3.  Patient is not on anticoagulation at home.  Anticoagulation was initially held due to recent surgery  Benefits/risks of 
Planning snf  
Problem: Discharge Planning  Goal: Discharge to home or other facility with appropriate resources  Outcome: Progressing  Flowsheets (Taken 2/3/2025 1428)  Discharge to home or other facility with appropriate resources:   Identify barriers to discharge with patient and caregiver   Arrange for needed discharge resources and transportation as appropriate   Identify discharge learning needs (meds, wound care, etc)     Problem: Pain  Goal: Verbalizes/displays adequate comfort level or baseline comfort level  Outcome: Progressing  Flowsheets (Taken 2/3/2025 1428)  Verbalizes/displays adequate comfort level or baseline comfort level:   Encourage patient to monitor pain and request assistance   Assess pain using appropriate pain scale   Administer analgesics based on type and severity of pain and evaluate response   Implement non-pharmacological measures as appropriate and evaluate response   Consider cultural and social influences on pain and pain management   Notify Licensed Independent Practitioner if interventions unsuccessful or patient reports new pain     Problem: Safety - Adult  Goal: Free from fall injury  Outcome: Progressing  Flowsheets (Taken 2/3/2025 1428)  Free From Fall Injury:   Instruct family/caregiver on patient safety   Based on caregiver fall risk screen, instruct family/caregiver to ask for assistance with transferring infant if caregiver noted to have fall risk factors     Problem: Neurosensory - Adult  Goal: Achieves stable or improved neurological status  Outcome: Progressing  Flowsheets (Taken 2/3/2025 1428)  Achieves stable or improved neurological status:   Assess for and report changes in neurological status   Initiate measures to prevent increased intracranial pressure  Goal: Achieves maximal functionality and self care  Outcome: Progressing  Flowsheets (Taken 2/3/2025 1428)  Achieves maximal functionality and self care:   Monitor swallowing and airway patency with patient fatigue and changes 
Problem: Respiratory - Adult    Goal: Achieves optimal ventilation and oxygenation  Achieves optimal ventilation and oxygenation:   Assess for changes in respiratory status   Position to facilitate oxygenation and minimize respiratory effort   Respiratory therapy support as indicated   Assess for changes in mentation and behavior   Assess and instruct to report shortness of breath or any respiratory difficulty    Outcome: Progressing  
Problem: Respiratory - Adult    Goal: Achieves optimal ventilation and oxygenation  Achieves optimal ventilation and oxygenation:   Assess for changes in respiratory status   Respiratory therapy support as indicated   Assess for changes in mentation and behavior   Assess and instruct to report shortness of breath or any respiratory difficulty    Outcome: Progressing  
Tolerated L VALERIA well  Post operatively  to ICU, intubated  Defer extubation and advancement of diet to critical care  PT OT once appropriate  Anticoagulation to begin tomorrow morning   WBAT LLE once appropriate  Reinforce dressing prn saturation    Leonor Hampton PA-C    
Was notified by RN that family in the room reported  when they came in patient was difficult to wake up, and was slow in speech RN reported when she went to patient room he was awake and oriented. Examined patient was resting on the bed comfortably AOX4 , denies sob,cp, palpitations, neurology exam intact.Patient stated earlier on he was deep asleep when family came in and as they tried to wake him up he was still feeling sleepy  
Peña, Patt, RN  Skin Integrity Remains Intact: Monitor for areas of redness and/or skin breakdown     
cardiac rate and rhythm     Problem: Skin/Tissue Integrity - Adult  Goal: Skin integrity remains intact  Description: 1.  Monitor for areas of redness and/or skin breakdown  2.  Assess vascular access sites hourly  3.  Every 4-6 hours minimum:  Change oxygen saturation probe site  4.  Every 4-6 hours:  If on nasal continuous positive airway pressure, respiratory therapy assess nares and determine need for appliance change or resting period  Outcome: Progressing  Flowsheets (Taken 2/7/2025 1751)  Skin Integrity Remains Intact: Monitor for areas of redness and/or skin breakdown     Problem: Musculoskeletal - Adult  Goal: Return mobility to safest level of function  Outcome: Progressing  Goal: Maintain proper alignment of affected body part  Outcome: Progressing  Goal: Return ADL status to a safe level of function  Outcome: Progressing     Problem: Skin/Tissue Integrity  Goal: Skin integrity remains intact  Description: 1.  Monitor for areas of redness and/or skin breakdown  2.  Assess vascular access sites hourly  3.  Every 4-6 hours minimum:  Change oxygen saturation probe site  4.  Every 4-6 hours:  If on nasal continuous positive airway pressure, respiratory therapy assess nares and determine need for appliance change or resting period  Outcome: Progressing  Flowsheets (Taken 2/7/2025 1751)  Skin Integrity Remains Intact: Monitor for areas of redness and/or skin breakdown     Problem: Gastrointestinal - Adult  Goal: Minimal or absence of nausea and vomiting  Outcome: Progressing     Problem: Genitourinary - Adult  Goal: Absence of urinary retention  Outcome: Progressing     Problem: Coping  Goal: Pt/Family able to verbalize concerns and demonstrate effective coping strategies  Description: INTERVENTIONS:  1. Assist patient/family to identify coping skills, available support systems and cultural and spiritual values  2. Provide emotional support, including active listening and acknowledgement of concerns of patient 
functionality and self care:   Monitor swallowing and airway patency with patient fatigue and changes in neurological status   Encourage and assist patient to increase activity and self care with guidance from physical therapy/occupational therapy     Problem: Respiratory - Adult  Goal: Achieves optimal ventilation and oxygenation  Outcome: Progressing  Flowsheets (Taken 2/6/2025 1757)  Achieves optimal ventilation and oxygenation:   Assess for changes in respiratory status   Assess for changes in mentation and behavior     Problem: Cardiovascular - Adult  Goal: Absence of cardiac dysrhythmias or at baseline  2/6/2025 1757 by Reagan Light, RN  Outcome: Progressing  Flowsheets (Taken 2/6/2025 1757)  Absence of cardiac dysrhythmias or at baseline:   Monitor cardiac rate and rhythm   Assess for signs of decreased cardiac output     Problem: Skin/Tissue Integrity - Adult  Goal: Skin integrity remains intact  Description: 1.  Monitor for areas of redness and/or skin breakdown  2.  Assess vascular access sites hourly  3.  Every 4-6 hours minimum:  Change oxygen saturation probe site  4.  Every 4-6 hours:  If on nasal continuous positive airway pressure, respiratory therapy assess nares and determine need for appliance change or resting period  2/6/2025 1757 by Reagan Light, RN  Outcome: Progressing  Flowsheets  Taken 2/6/2025 1757  Skin Integrity Remains Intact:   Monitor for areas of redness and/or skin breakdown   Assess vascular access sites hourly    Problem: Musculoskeletal - Adult  Goal: Return mobility to safest level of function  Outcome: Progressing  Flowsheets (Taken 2/6/2025 1757)  Return Mobility to Safest Level of Function:   Assess patient stability and activity tolerance for standing, transferring and ambulating with or without assistive devices   Assist with transfers and ambulation using safe patient handling equipment as needed   Ensure adequate protection for wounds/incisions during mobilization   
neurological status  2/2/2025 0732 by Ann Langford RN  Outcome: Progressing  Flowsheets (Taken 2/2/2025 0732)  Achieves stable or improved neurological status: Assess for and report changes in neurological status     Problem: Neurosensory - Adult  Goal: Achieves maximal functionality and self care  2/2/2025 0732 by Ann Langford RN  Outcome: Progressing  Flowsheets (Taken 2/2/2025 0732)  Achieves maximal functionality and self care:   Monitor swallowing and airway patency with patient fatigue and changes in neurological status   Encourage and assist patient to increase activity and self care with guidance from physical therapy/occupational therapy     Problem: Respiratory - Adult  Goal: Achieves optimal ventilation and oxygenation  2/2/2025 0732 by Ann Langford RN  Outcome: Progressing  Flowsheets (Taken 2/2/2025 0732)  Achieves optimal ventilation and oxygenation: Assess for changes in respiratory status     Problem: Cardiovascular - Adult  Goal: Absence of cardiac dysrhythmias or at baseline  2/2/2025 0732 by Ann Langford RN  Outcome: Progressing  Flowsheets (Taken 2/2/2025 0732)  Absence of cardiac dysrhythmias or at baseline: Monitor cardiac rate and rhythm     Problem: Skin/Tissue Integrity - Adult  Goal: Skin integrity remains intact  Description: 1.  Monitor for areas of redness and/or skin breakdown  2.  Assess vascular access sites hourly  3.  Every 4-6 hours minimum:  Change oxygen saturation probe site  4.  Every 4-6 hours:  If on nasal continuous positive airway pressure, respiratory therapy assess nares and determine need for appliance change or resting period  2/2/2025 0732 by Ann Langford RN  Outcome: Progressing  Flowsheets (Taken 2/2/2025 0732)  Skin Integrity Remains Intact: Monitor for areas of redness and/or skin breakdown     Problem: Musculoskeletal - Adult  Goal: Return mobility to safest level of function  2/2/2025 0732 by Ann Langford RN  Outcome: 
of patient and caregivers   Reduce environmental stimuli, as able   Instruct patient/family in relaxation techniques, as appropriate   Assess for spiritual pain/suffering and initiate Spiritual Care, Psychosocial Clinical Specialist consults as needed   Patient agreeable with plan of care.  
status  2/6/2025 1757 by Reagan Light, RN  Outcome: Progressing  Flowsheets (Taken 2/6/2025 1757)  Nutrient intake appropriate for improving, restoring, or maintaining nutritional needs:   Assess nutritional status and recommend course of action   Monitor oral intake, labs, and treatment plans

## 2025-02-10 NOTE — DISCHARGE SUMMARY
Physician Discharge Summary     Patient ID:  Gabo Maldonado  [id number]  84 y.o.  1940    Admit date: 1/28/2025    Discharge date and time: 2/10/25    Admitting Physician: Isaac Estrada MD     Discharge Physician: Isaac Estrada MD    Admission Diagnoses: Heart murmur [R01.1]  Trauma [T14.90XA]  Left hip pain [M25.552]  Inability to ambulate due to hip [R26.2]  Fall, initial encounter [W19.XXXA]  Closed fracture of neck of left femur, initial encounter ([facility]) [S72.002A]    Discharge Diagnoses: Heart murmur [R01.1]  Trauma [T14.90XA]  Left hip pain [M25.552]  Inability to ambulate due to hip [R26.2]  Fall, initial encounter [W19.XXXA]  Closed fracture of neck of left femur, initial encounter ([facility]) [S72.002A]    Admission Condition: fair    Discharged Condition: fair    Indication for Admission: unstable orthopaedic injury    Hospital Course: Patient is now s/p L VALERIA on 1/31/25 by Dr. Estrada. Patient presented to the ED on 1/28/25 after sustaining a mechanical fall resulting in a left femoral neck fracture. Surgical intervention was recommended, patient agreeable to proceed. Hospitalist was consulted for perioperative risk assessment, medication reconciliation, and medical management after trauma transferred plan of care and attending status to orthopaedic service team . Optimized from consulting services, patient was intubated pre operatively due to hypoxia. On the day of surgery, patient was identified in the pre-operative holding area and agreeable to proceed with surgery. Written consent was obtained. Please see operative note for further details of this procedure. Patient received dang-operative antibiotics. Patient recovered in PACU before transfer to ICU. Tolerated extubated without difficulty and stable for transfer down and out of unit. Patient was started on tylenol and oxy for pain control and lovenox for dvt prophylaxis while in house and ASA upon discharge. Patient advanced diet and there were no

## 2025-02-10 NOTE — PROGRESS NOTES
AVS reviewed with patient and wife. Patient discharged with all belongings. Patient being transported to [place] by [facility].

## 2025-02-13 ENCOUNTER — HOSPITAL ENCOUNTER (EMERGENCY)
Age: 85
Discharge: HOME | End: 2025-02-13
Attending: STUDENT IN AN ORGANIZED HEALTH CARE EDUCATION/TRAINING PROGRAM
Payer: MEDICARE

## 2025-02-13 VITALS
TEMPERATURE: 97.3 F | DIASTOLIC BLOOD PRESSURE: 77 MMHG | SYSTOLIC BLOOD PRESSURE: 118 MMHG | RESPIRATION RATE: 18 BRPM | OXYGEN SATURATION: 94 % | HEART RATE: 74 BPM

## 2025-02-13 DIAGNOSIS — M96.830 POSTPROCEDURAL HEMORRHAGE AND HEMATOMA OF MUSCULOSKELETAL STRUCTURE FOLLOWING MUSCULOSKELETAL SYSTEM PROCEDURE: Primary | ICD-10-CM

## 2025-02-13 DIAGNOSIS — M96.840 POSTPROCEDURAL HEMORRHAGE AND HEMATOMA OF MUSCULOSKELETAL STRUCTURE FOLLOWING MUSCULOSKELETAL SYSTEM PROCEDURE: Primary | ICD-10-CM

## 2025-02-13 PROCEDURE — 99283 EMERGENCY DEPT VISIT LOW MDM: CPT

## 2025-02-13 ASSESSMENT — PAIN - FUNCTIONAL ASSESSMENT
PAIN_FUNCTIONAL_ASSESSMENT: NONE - DENIES PAIN
PAIN_FUNCTIONAL_ASSESSMENT: NONE - DENIES PAIN

## 2025-02-13 NOTE — ED PROVIDER NOTES
[facility] [address]  [phone]    If symptoms worsen    Isaac Estrada MD  [address] [address]  [phone]    In 2 days  As needed      Rom Kaminski DO    This transcription was electronically signed. Parts of this transcriptions may have been dictated by use of voice recognition software and electronically transcribed, and parts may have been transcribed with the assistance of an ED scribe. The transcription may contain errors not detected in proofreading.  Please refer to my supervising physician's documentation if my documentation differs.    Electronically Signed: Rom Kaminski DO, 02/13/25, 8:42 PM

## 2025-02-13 NOTE — ED NOTES
Pt to ED via EMS from [facility] c/o hip replacement incision bleeding. Pt states he had his left hip replaced last month and is currently getting rehab. Pt states he scrapped his incision two different times on a chair/bedside commode and it started to bleed profusely. Pt states they were able to stop the bleeding with dressings but this time it hasn't stopped. Pt denies any pain and states rehab is going well. VSS. Bleeding controlled at this time. Bandages soiled with blood.

## 2025-02-14 ENCOUNTER — TELEPHONE (OUTPATIENT)
Dept: FAMILY MEDICINE CLINIC | Age: 85
End: 2025-02-14

## 2025-02-14 NOTE — DISCHARGE INSTRUCTIONS
You are seen here today for bleeding around your incision site.  Make sure today keep the pressure dressing on it until nursing staff is ready to change it.  If the bleeding returns make sure put pressure on it immediately.  Return here if you are unable to get the bleeding is stopped.  Follow-up with your surgeon at [facility] this coming Wednesday at noon.

## 2025-02-14 NOTE — ED NOTES
Pt turned in bed to change depends. Pt incision site started to bleed. Bleeding seen through dressing. Dr. Kaminski notified. States to continue to monitor for excess bleeding.

## 2025-02-19 ENCOUNTER — TELEPHONE (OUTPATIENT)
Dept: CARDIOLOGY CLINIC | Age: 85
End: 2025-02-19

## 2025-02-19 NOTE — TELEPHONE ENCOUNTER
Patient see's Dr Trujillo on: 2/24/25    Referring Provider: Evon Arreola MD    Primary Cardiologist:    ARA send to:     History: HTN, adenomatous colon polyp (2011), syncope, HLD, HTN, PAF, pituitary macroadenoma (2009), fall on 1/28/25 sustaining in left femoral neck fracture     Echo: 1/30/25  Left Ventricle Normal left ventricular systolic function with a visually estimated EF of 60 - 65%. Left ventricle size is normal. Moderately increased wall thickness. Normal wall motion. Diastolic dysfunction present with increased LAP with normal LV EF.   Left Atrium Left atrium size is normal.   Right Ventricle Right ventricle size is normal. Normal systolic function.   Right Atrium Right atrium size is normal.   Aortic Valve Not well visualized. Trileaflet valve. Moderately thickened cusps. Mildly calcified cusps. Trace regurgitation. Mild stenosis of the aortic valve.   Mitral Valve Annular calcification. Mildly thickened leaflets. Mild regurgitation. No stenosis noted.   Tricuspid Valve Valve structure is normal. Mild regurgitation. No stenosis noted.   Pulmonic Valve The pulmonic valve visualization is suboptimal but appears to be functioning normally. Mild regurgitation. No stenosis noted.   Aorta Normal sized aortic root and ascending aorta.   IVC/Hepatic Veins IVC diameter is less than or equal to 21 mm and decreases greater than 50% during inspiration; therefore the estimated right atrial pressure is normal (~3 mmHg). IVC size is normal.   Pericardium No pericardial effusion.       Labs: 2/10/25          OAC vs antiplatelet inhibitor:    Do you have trouble swallowing?    Any issues with anesthesia or sedation?    Gabo Maldonado is being referred for Left Atrial Appendage Closure with WATCHMAN device for management of stroke risk resulting from non-valvular atrial fibrillation. Based on their past history, it has been determined that they are poor candidates for long-term oral-anticoagulation, however may be

## 2025-02-24 ENCOUNTER — TELEPHONE (OUTPATIENT)
Dept: CARDIOLOGY CLINIC | Age: 85
End: 2025-02-24

## 2025-02-24 ENCOUNTER — OFFICE VISIT (OUTPATIENT)
Dept: CARDIOLOGY CLINIC | Age: 85
End: 2025-02-24
Payer: MEDICARE

## 2025-02-24 VITALS — SYSTOLIC BLOOD PRESSURE: 118 MMHG | DIASTOLIC BLOOD PRESSURE: 70 MMHG | HEART RATE: 72 BPM

## 2025-02-24 DIAGNOSIS — I48.0 PAROXYSMAL ATRIAL FIBRILLATION (HCC): Primary | ICD-10-CM

## 2025-02-24 PROCEDURE — 1123F ACP DISCUSS/DSCN MKR DOCD: CPT | Performed by: INTERNAL MEDICINE

## 2025-02-24 PROCEDURE — 99205 OFFICE O/P NEW HI 60 MIN: CPT | Performed by: INTERNAL MEDICINE

## 2025-02-24 NOTE — PROGRESS NOTES
[facility] [place] [facility]  [facility] CARDIOLOGY  [address]  [address]  [place] [address]  Dept: [phone]  Dept Fax: [phone]  Loc: [phone]    Visit Date: 2/24/2025    Mr. Maldonado is a 85 y.o. male  who presented for:  Chief Complaint   Patient presents with    Follow-up       HPI:     History of Present Illness  The patient is an 85-year-old male who presents for evaluation of atrial fibrillation.    He recently fell and needed hip surgery. He has been diagnosed with atrial fibrillation, a condition he was previously unaware of. He is currently on a regimen of two aspirin tablets daily, as recommended by Dr. Soler. He reports no difficulty in swallowing or any adverse reactions to anesthesia during his hip surgery. He does not take anticoagulants for any other conditions. He has expressed interest in exploring alternative treatment options such as ablation for his atrial fibrillation. His medical history includes a hip fracture, which occurred on 01/31/2025 due to a fall on ice at the nursing home. He was subsequently hospitalized and underwent a series of tests. During his hospital stay, he was administered Lovenox but declined the use of Eliquis.  He wore and event monitor showing pauses in the early AM.      MEDICATIONS  Current: aspirin  Past: Lovenox         Referring Provider: Evon Arreola MD     Primary Cardiologist:     ARA send to:      History: HTN, adenomatous colon polyp (2011), syncope, HLD, HTN, PAF, pituitary macroadenoma (2009), fall on 1/28/25 sustaining in left femoral neck fracture      Echo: 1/30/25  Left Ventricle Normal left ventricular systolic function with a visually estimated EF of 60 - 65%. Left ventricle size is normal. Moderately increased wall thickness. Normal wall motion. Diastolic dysfunction present with increased LAP with normal LV EF.   Left Atrium Left atrium size is normal.   Right Ventricle Right ventricle size is normal.

## 2025-02-24 NOTE — TELEPHONE ENCOUNTER
Update OV note from 2/24/2025 to stop Metoprolol.  Monitor strips scanned in chart.   Pt notified.    Site: Forehead (28 Apr 2023 02:45)  Bilirubin: 7.6 (28 Apr 2023 02:45)

## 2025-02-24 NOTE — TELEPHONE ENCOUNTER
Padmini from [facility] called needing an order stating to stop metoprolol. Ronnie gone for the day so please write order and have him sign.   Fax: [phone]  Phone: [phone]

## 2025-02-24 NOTE — TELEPHONE ENCOUNTER
Monitor strips scanned in chart.  Looks like long pause on Saturday.   He has an appt today but just wanted to make sure patient feeling ok.

## 2025-02-25 NOTE — TELEPHONE ENCOUNTER
Order signed and faxed.   I called [facility] and spoke to Padmini and she is aware order has been faxed to stop the Metoprolol.
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
## 2025-03-03 ENCOUNTER — CARE COORDINATION (OUTPATIENT)
Dept: CASE MANAGEMENT | Age: 85
End: 2025-03-03

## 2025-03-03 DIAGNOSIS — S72.002A LEFT DISPLACED FEMORAL NECK FRACTURE (HCC): Primary | ICD-10-CM

## 2025-03-03 PROCEDURE — 1111F DSCHRG MED/CURRENT MED MERGE: CPT | Performed by: FAMILY MEDICINE

## 2025-03-06 ENCOUNTER — OFFICE VISIT (OUTPATIENT)
Dept: FAMILY MEDICINE CLINIC | Age: 85
End: 2025-03-06

## 2025-03-06 VITALS
RESPIRATION RATE: 16 BRPM | DIASTOLIC BLOOD PRESSURE: 68 MMHG | BODY MASS INDEX: 41.45 KG/M2 | SYSTOLIC BLOOD PRESSURE: 118 MMHG | HEART RATE: 76 BPM | WEIGHT: 272.6 LBS

## 2025-03-06 DIAGNOSIS — E66.01 OBESITY, CLASS III, BMI 40-49.9 (MORBID OBESITY): ICD-10-CM

## 2025-03-06 DIAGNOSIS — E22.0 ACROMEGALY (HCC): ICD-10-CM

## 2025-03-06 DIAGNOSIS — I25.10 CORONARY ARTERY DISEASE DUE TO LIPID RICH PLAQUE: ICD-10-CM
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
## 2025-03-06 DIAGNOSIS — S72.012A: Primary | ICD-10-CM

## 2025-03-06 DIAGNOSIS — I10 ESSENTIAL HYPERTENSION: ICD-10-CM

## 2025-03-06 DIAGNOSIS — I25.83 CORONARY ARTERY DISEASE DUE TO LIPID RICH PLAQUE: ICD-10-CM

## 2025-03-06 ASSESSMENT — ENCOUNTER SYMPTOMS
GASTROINTESTINAL NEGATIVE: 1
RESPIRATORY NEGATIVE: 1

## 2025-03-06 NOTE — PROGRESS NOTES
Skin:     General: Skin is warm and dry.      Findings: No rash (on exposed surfaces).   Neurological:      General: No focal deficit present.      Mental Status: He is alert.   Psychiatric:         Attention and Perception: Attention normal.         Mood and Affect: Mood normal.         Speech: Speech normal.         Behavior: Behavior normal. Behavior is cooperative.         Thought Content: Thought content normal.         Judgment: Judgment normal.            Assessment & Plan   ASSESSMENT/PLAN:  1. Closed intracapsular fracture of left femur, initial encounter ([facility])  2. Fall, initial encounter  3. Coronary artery disease due to lipid rich plaque  -     Handicap placard  4. Essential hypertension  5. Obesity, Class III, BMI 40-49.9 (morbid obesity)  6. 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 Estrada again in 3 weeks    Return for Keep known appt.             An electronic signature was used to authenticate this note.    --Pedro Escobedo,

## 2025-03-12 ENCOUNTER — TELEPHONE (OUTPATIENT)
Dept: FAMILY MEDICINE CLINIC | Age: 85
End: 2025-03-12

## 2025-03-12 NOTE — TELEPHONE ENCOUNTER
Monitor daily weights.  Notify office if weight continues to climb and LE edema worsens.  If weight is stable, will hold off on diuretic at this time.  Yes, continue ASA.  Pt no longer on metoprolol.

## 2025-03-12 NOTE — TELEPHONE ENCOUNTER
Shahnaz with [facility]  called office for a couple things. Pt forgot to mention at his appt with us last week that his legs were swelling. Shahnaz says today he has +1 edema in bilateral lower legs.     Is pt to continue with the ASA daily?    Pt says Metoprolol was stopped but  med list still has this on there. She wants to confirm Metoprolol was discontinued.

## 2025-03-15 LAB — ECHO BSA: 2.46 M2

## 2025-04-08 ENCOUNTER — HOSPITAL ENCOUNTER (OUTPATIENT)
Dept: PHYSICAL THERAPY | Age: 85
Setting detail: THERAPIES SERIES
Discharge: HOME | End: 2025-04-08
Payer: MEDICARE

## 2025-04-08 PROCEDURE — 97161 PT EVAL LOW COMPLEX 20 MIN: CPT

## 2025-04-08 PROCEDURE — 97110 THERAPEUTIC EXERCISES: CPT

## 2025-04-08 ASSESSMENT — HOOS JR
WALKING ON UNEVEN SURFACE: MILD
RISING FROM SITTING: MODERATE
HOOS JR TOTAL INTERVAL SCORE: 61.815
HOOS JR RAW SCORE: 9
LYING IN BED (TURNING OVER, MAINTAINING HIP POSITION): MILD
HOOS JR RAW SCORE: 9
BENDING TO THE FLOOR TO PICK UP OBJECT: EXTREME
SITTING: MILD

## 2025-04-08 NOTE — PROGRESS NOTES
[facility]  PHYSICAL THERAPY  [x] EVALUATION  [] DAILY NOTE (LAND) [] DAILY NOTE (AQUATIC ) [] PROGRESS NOTE [] DISCHARGE NOTE    [x] OUTPATIENT REHABILITATION CENTER [facility]   [] [facility] [place]    [] [facility]   [] [facility]    Date: 2025  Patient Name:  Gabo Maldonado  : 1940  MRN: [id number]  CSN: [id number]    Referring Practitioner Isaac Estrada MD [id number]      Diagnosis  Diagnoses       Z47.1 (ICD-10-CM) - Aftercare following joint replacement surgery    Z96.642 (ICD-10-CM) - Presence of left artificial hip joint    S72.002D (ICD-10-CM) - Fracture of unspecified part of neck of left femur, subsequent encounter for closed fracture with routine healing           Treatment Diagnosis M25.552  Left Hip Pain  M25.652 Stiffness of left hip, not elsewhere classified  R53.1 Weakness  Z47.1 Aftercare following joint replacement surgery   Date of Evaluation 25   Additional Pertinent History Gabo Maldonado has a past medical history of Accelerated hypertension, Adenomatous colon polyp, Ataxic gait, Blood transfusion reaction, Cervical muscle pain, Episodic lightheadedness, not positional, Erectile dysfunction, History of adenomatous polyp of colon, , History of blood transfusion, Hyperlipidemia, Hypertension, Other disorders of kidney and ureter in diseases classified elsewhere, Paroxysmal atrial fibrillation (HCC), Pituitary macroadenoma (HCC), Pneumonia due to COVID-19 virus, S/P laparoscopic cholecystectomy, Testicular pain, left, Tobacco dependence in remission, and Zoster.  he has a past surgical history that includes hernia repair (); Tonsillectomy (); Tooth Extraction (unsure); Abdomen surgery (unsure); Colonoscopy; Cholecystectomy; Dental surgery; Cardiac catheterization; Tonsillectomy and adenoidectomy; and hip surgery (N/A, 2025).     Allergies Allergies   Allergen Reactions    Dilaudid [Hydromorphone Hcl]

## 2025-04-11 ENCOUNTER — HOSPITAL ENCOUNTER (OUTPATIENT)
Dept: PHYSICAL THERAPY | Age: 85
Setting detail: THERAPIES SERIES
Discharge: HOME | End: 2025-04-11
Payer: MEDICARE

## 2025-04-11 PROCEDURE — 97110 THERAPEUTIC EXERCISES: CPT

## 2025-04-11 NOTE — PROGRESS NOTES
Shortness Of Breath and Swelling    Hydrocodone-Acetaminophen Other (See Comments)     Visual halucinations.      Medications   Current Outpatient Medications:     aspirin 81 MG EC tablet, Take 1 tablet by mouth in the morning and at bedtime for 21 days, Disp: 42 tablet, Rfl: 0    lisinopril (PRINIVIL;ZESTRIL) 20 MG tablet, TAKE 1 TABLET TWICE A DAY, Disp: 180 tablet, Rfl: 3    ascorbic acid (VITAMIN C) 500 MG tablet, Take 1 tablet by mouth daily, Disp: 30 tablet, Rfl: 3    Cholecalciferol (VITAMIN D-3 PO), Take by mouth, Disp: , Rfl:     ZINC PO, Take by mouth, Disp: , Rfl:     Misc Natural Products (GLUCOSAMINE CHONDROITIN MSM PO), Take by mouth, Disp: , Rfl:     docusate sodium (COLACE) 100 MG capsule, Take 1 capsule by mouth every evening, Disp: , Rfl:     multivitamin (THERAGRAN) per tablet, Take 1 tablet by mouth daily, Disp: , Rfl:     Lysine 500 MG CAPS, Take 500 mg by mouth. 2 daily  , Disp: , Rfl:     Arginine 500 MG CAPS, Take 1,000 mg by mouth daily , Disp: , Rfl:     B Complex Vitamins (VITAMIN B COMPLEX PO), Take  by mouth. Twice weekly , Disp: , Rfl:       Functional Outcome Measure Used HOOS    Functional Outcome Score 9/24 (4/8/25)       Insurance: Primary: Payor: [employer] /  /  / ,   Secondary:    Authorization Information PRE CERTIFICATION REQUIRED: Additional authorization not required.  INSURANCE THERAPY BENEFIT: Visits approved based on medical necessity.. .   AQUATIC THERAPY COVERED: Yes  MODALITIES COVERED:  Yes   Initial CPT Codes Requested 49845 - Therapeutic Exercise, 18834 - Manual Therapy , 52845 - Aquatic Therapeutic Exercise, 11018 - Neuromuscular Re-Education , and 69541 - Gait Training   Progress Note Counter 2/10 for progress note (Reporting Period: 4/8/25 to     )   Recertification Date 06/03/25   Physician Follow-Up Patient has been released from surgeon, does not have to follow up with that office now   Physician Orders    History of Present Illness Patient reports that

## 2025-04-15 ENCOUNTER — HOSPITAL ENCOUNTER (OUTPATIENT)
Dept: PHYSICAL THERAPY | Age: 85
Setting detail: THERAPIES SERIES
Discharge: HOME | End: 2025-04-15
Payer: MEDICARE

## 2025-04-15 PROCEDURE — 97110 THERAPEUTIC EXERCISES: CPT

## 2025-04-15 NOTE — PROGRESS NOTES
[facility]  PHYSICAL THERAPY  [] EVALUATION  [x] DAILY NOTE (LAND) [] DAILY NOTE (AQUATIC ) [] PROGRESS NOTE [] DISCHARGE NOTE    [x] OUTPATIENT REHABILITATION CENTER [facility]   [] [facility] CARE [place]    [] [facility]   [] [facility]    Date: 4/15/2025  Patient Name:  Gabo Maldonado  : 1940  MRN: [id number]  CSN: [id number]    Referring Practitioner Isaac Estrada MD [id number]      Diagnosis  Diagnoses       Z47.1 (ICD-10-CM) - Aftercare following joint replacement surgery    Z96.642 (ICD-10-CM) - Presence of left artificial hip joint    S72.002D (ICD-10-CM) - Fracture of unspecified part of neck of left femur, subsequent encounter for closed fracture with routine healing           Treatment Diagnosis M25.552  Left Hip Pain  M25.652 Stiffness of left hip, not elsewhere classified  R53.1 Weakness  Z47.1 Aftercare following joint replacement surgery   Date of Evaluation 25   Additional Pertinent History Gabo Maldonado has a past medical history of Accelerated hypertension, Adenomatous colon polyp, Ataxic gait, Blood transfusion reaction, Cervical muscle pain, Episodic lightheadedness, not positional, Erectile dysfunction, History of adenomatous polyp of colon, , History of blood transfusion, Hyperlipidemia, Hypertension, Other disorders of kidney and ureter in diseases classified elsewhere, Paroxysmal atrial fibrillation (HCC), Pituitary macroadenoma (HCC), Pneumonia due to COVID-19 virus, S/P laparoscopic cholecystectomy, Testicular pain, left, Tobacco dependence in remission, and Zoster.  he has a past surgical history that includes hernia repair (); Tonsillectomy (); Tooth Extraction (unsure); Abdomen surgery (unsure); Colonoscopy; Cholecystectomy; Dental surgery; Cardiac catheterization; Tonsillectomy and adenoidectomy; and hip surgery (N/A, 2025).     Allergies Allergies   Allergen Reactions    Dilaudid [Hydromorphone Hcl]

## 2025-04-17 ENCOUNTER — HOSPITAL ENCOUNTER (OUTPATIENT)
Dept: PHYSICAL THERAPY | Age: 85
Setting detail: THERAPIES SERIES
Discharge: HOME | End: 2025-04-17
Payer: MEDICARE

## 2025-04-17 PROCEDURE — 97110 THERAPEUTIC EXERCISES: CPT

## 2025-04-17 NOTE — PROGRESS NOTES
[facility]  PHYSICAL THERAPY  [] EVALUATION  [x] DAILY NOTE (LAND) [] DAILY NOTE (AQUATIC ) [] PROGRESS NOTE [] DISCHARGE NOTE    [x] OUTPATIENT REHABILITATION CENTER [facility]   [] [facility] CARE [place]    [] [facility]   [] [facility]    Date: 2025  Patient Name:  Gabo Maldonado  : 1940  MRN: [id number]  CSN: [id number]    Referring Practitioner Isaac Estrada MD [id number]      Diagnosis  Diagnoses       Z47.1 (ICD-10-CM) - Aftercare following joint replacement surgery    Z96.642 (ICD-10-CM) - Presence of left artificial hip joint    S72.002D (ICD-10-CM) - Fracture of unspecified part of neck of left femur, subsequent encounter for closed fracture with routine healing           Treatment Diagnosis M25.552  Left Hip Pain  M25.652 Stiffness of left hip, not elsewhere classified  R53.1 Weakness  Z47.1 Aftercare following joint replacement surgery   Date of Evaluation 25   Additional Pertinent History Gabo Maldonado has a past medical history of Accelerated hypertension, Adenomatous colon polyp, Ataxic gait, Blood transfusion reaction, Cervical muscle pain, Episodic lightheadedness, not positional, Erectile dysfunction, History of adenomatous polyp of colon, , History of blood transfusion, Hyperlipidemia, Hypertension, Other disorders of kidney and ureter in diseases classified elsewhere, Paroxysmal atrial fibrillation (HCC), Pituitary macroadenoma (HCC), Pneumonia due to COVID-19 virus, S/P laparoscopic cholecystectomy, Testicular pain, left, Tobacco dependence in remission, and Zoster.  he has a past surgical history that includes hernia repair (); Tonsillectomy (); Tooth Extraction (unsure); Abdomen surgery (unsure); Colonoscopy; Cholecystectomy; Dental surgery; Cardiac catheterization; Tonsillectomy and adenoidectomy; and hip surgery (N/A, 2025).     Allergies Allergies   Allergen Reactions    Dilaudid [Hydromorphone Hcl]

## 2025-04-21 ENCOUNTER — HOSPITAL ENCOUNTER (OUTPATIENT)
Dept: PHYSICAL THERAPY | Age: 85
Setting detail: THERAPIES SERIES
Discharge: HOME | End: 2025-04-21
Payer: MEDICARE

## 2025-04-21 PROCEDURE — 97110 THERAPEUTIC EXERCISES: CPT

## 2025-04-21 NOTE — PROGRESS NOTES
[facility]  PHYSICAL THERAPY  [] EVALUATION  [x] DAILY NOTE (LAND) [] DAILY NOTE (AQUATIC ) [] PROGRESS NOTE [] DISCHARGE NOTE    [x] OUTPATIENT REHABILITATION CENTER [facility]   [] [facility] CARE [place]    [] [facility]   [] [facility]    Date: 2025  Patient Name:  Gabo Maldonado  : 1940  MRN: [id number]  [facility]: [id number]    Referring Practitioner Isaac Estrada MD [id number]      Diagnosis  Diagnoses       Z47.1 (ICD-10-CM) - Aftercare following joint replacement surgery    Z96.642 (ICD-10-CM) - Presence of left artificial hip joint    S72.002D (ICD-10-CM) - Fracture of unspecified part of neck of left femur, subsequent encounter for closed fracture with routine healing           Treatment Diagnosis M25.552  Left Hip Pain  M25.652 Stiffness of left hip, not elsewhere classified  R53.1 Weakness  Z47.1 Aftercare following joint replacement surgery   Date of Evaluation 25   Additional Pertinent History Gabo Maldonado has a past medical history of Accelerated hypertension, Adenomatous colon polyp, Ataxic gait, Blood transfusion reaction, Cervical muscle pain, Episodic lightheadedness, not positional, Erectile dysfunction, History of adenomatous polyp of colon, , History of blood transfusion, Hyperlipidemia, Hypertension, Other disorders of kidney and ureter in diseases classified elsewhere, Paroxysmal atrial fibrillation (HCC), Pituitary macroadenoma (HCC), Pneumonia due to COVID-19 virus, S/P laparoscopic cholecystectomy, Testicular pain, left, Tobacco dependence in remission, and Zoster.  he has a past surgical history that includes hernia repair (); Tonsillectomy (); Tooth Extraction (unsure); Abdomen surgery (unsure); Colonoscopy; Cholecystectomy; Dental surgery; Cardiac catheterization; Tonsillectomy and adenoidectomy; and hip surgery (N/A, 2025).     Allergies Allergies   Allergen Reactions    Dilaudid [Hydromorphone Hcl]

## 2025-04-24 ENCOUNTER — HOSPITAL ENCOUNTER (OUTPATIENT)
Dept: PHYSICAL THERAPY | Age: 85
Setting detail: THERAPIES SERIES
Discharge: HOME | End: 2025-04-24
Payer: MEDICARE

## 2025-04-24 PROCEDURE — 97110 THERAPEUTIC EXERCISES: CPT

## 2025-04-24 NOTE — PROGRESS NOTES
[facility]  PHYSICAL THERAPY  [] EVALUATION  [x] DAILY NOTE (LAND) [] DAILY NOTE (AQUATIC ) [] PROGRESS NOTE [] DISCHARGE NOTE    [x] OUTPATIENT REHABILITATION CENTER [facility]   [] [facility] CARE [place]    [] [facility]   [] [facility]    Date: 2025  Patient Name:  Gabo Maldonado  : 1940  MRN: [id number]  [facility]: [id number]    Referring Practitioner Isaac Estrada MD [id number]      Diagnosis  Diagnoses       Z47.1 (ICD-10-CM) - Aftercare following joint replacement surgery    Z96.642 (ICD-10-CM) - Presence of left artificial hip joint    S72.002D (ICD-10-CM) - Fracture of unspecified part of neck of left femur, subsequent encounter for closed fracture with routine healing           Treatment Diagnosis M25.552  Left Hip Pain  M25.652 Stiffness of left hip, not elsewhere classified  R53.1 Weakness  Z47.1 Aftercare following joint replacement surgery   Date of Evaluation 25   Additional Pertinent History Gabo Maldonado has a past medical history of Accelerated hypertension, Adenomatous colon polyp, Ataxic gait, Blood transfusion reaction, Cervical muscle pain, Episodic lightheadedness, not positional, Erectile dysfunction, History of adenomatous polyp of colon, , History of blood transfusion, Hyperlipidemia, Hypertension, Other disorders of kidney and ureter in diseases classified elsewhere, Paroxysmal atrial fibrillation (HCC), Pituitary macroadenoma (HCC), Pneumonia due to COVID-19 virus, S/P laparoscopic cholecystectomy, Testicular pain, left, Tobacco dependence in remission, and Zoster.  he has a past surgical history that includes hernia repair (); Tonsillectomy (); Tooth Extraction (unsure); Abdomen surgery (unsure); Colonoscopy; Cholecystectomy; Dental surgery; Cardiac catheterization; Tonsillectomy and adenoidectomy; and hip surgery (N/A, 2025).     Allergies Allergies   Allergen Reactions    Dilaudid [Hydromorphone Hcl]

## 2025-04-29 ENCOUNTER — HOSPITAL ENCOUNTER (OUTPATIENT)
Dept: PHYSICAL THERAPY | Age: 85
Setting detail: THERAPIES SERIES
Discharge: HOME | End: 2025-04-29
Payer: MEDICARE

## 2025-04-29 PROCEDURE — 97110 THERAPEUTIC EXERCISES: CPT

## 2025-04-29 NOTE — PROGRESS NOTES
[facility]  PHYSICAL THERAPY  [] EVALUATION  [x] DAILY NOTE (LAND) [] DAILY NOTE (AQUATIC ) [] PROGRESS NOTE [] DISCHARGE NOTE    [x] OUTPATIENT REHABILITATION CENTER [facility]   [] [facility] CARE [place]    [] [facility]   [] [facility]    Date: 2025  Patient Name:  Gabo Maldonado  : 1940  MRN: [id number]  CSN: [id number]    Referring Practitioner Isaac Estrada MD [id number]      Diagnosis  Diagnoses       Z47.1 (ICD-10-CM) - Aftercare following joint replacement surgery    Z96.642 (ICD-10-CM) - Presence of left artificial hip joint    S72.002D (ICD-10-CM) - Fracture of unspecified part of neck of left femur, subsequent encounter for closed fracture with routine healing           Treatment Diagnosis M25.552  Left Hip Pain  M25.652 Stiffness of left hip, not elsewhere classified  R53.1 Weakness  Z47.1 Aftercare following joint replacement surgery   Date of Evaluation 25   Additional Pertinent History Gabo Maldonado has a past medical history of Accelerated hypertension, Adenomatous colon polyp, Ataxic gait, Blood transfusion reaction, Cervical muscle pain, Episodic lightheadedness, not positional, Erectile dysfunction, History of adenomatous polyp of colon, , History of blood transfusion, Hyperlipidemia, Hypertension, Other disorders of kidney and ureter in diseases classified elsewhere, Paroxysmal atrial fibrillation (HCC), Pituitary macroadenoma (HCC), Pneumonia due to COVID-19 virus, S/P laparoscopic cholecystectomy, Testicular pain, left, Tobacco dependence in remission, and Zoster.  he has a past surgical history that includes hernia repair (); Tonsillectomy (); Tooth Extraction (unsure); Abdomen surgery (unsure); Colonoscopy; Cholecystectomy; Dental surgery; Cardiac catheterization; Tonsillectomy and adenoidectomy; and hip surgery (N/A, 2025).     Allergies Allergies   Allergen Reactions    Dilaudid [Hydromorphone Hcl]

## 2025-05-01 ENCOUNTER — HOSPITAL ENCOUNTER (OUTPATIENT)
Dept: PHYSICAL THERAPY | Age: 85
Setting detail: THERAPIES SERIES
Discharge: HOME | End: 2025-05-01
Payer: MEDICARE

## 2025-05-01 PROCEDURE — 97110 THERAPEUTIC EXERCISES: CPT

## 2025-05-01 NOTE — PROGRESS NOTES
[facility]  PHYSICAL THERAPY  [] EVALUATION  [x] DAILY NOTE (LAND) [] DAILY NOTE (AQUATIC ) [] PROGRESS NOTE [] DISCHARGE NOTE    [x] OUTPATIENT REHABILITATION CENTER [facility]   [] [facility] CARE [place]    [] [facility]   [] [facility]    Date: 2025  Patient Name:  Gabo Maldonado  : 1940  MRN: [id number]  [facility]: [id number]    Referring Practitioner Isaac Estrada MD [id number]      Diagnosis  Diagnoses       Z47.1 (ICD-10-CM) - Aftercare following joint replacement surgery    Z96.642 (ICD-10-CM) - Presence of left artificial hip joint    S72.002D (ICD-10-CM) - Fracture of unspecified part of neck of left femur, subsequent encounter for closed fracture with routine healing           Treatment Diagnosis M25.552  Left Hip Pain  M25.652 Stiffness of left hip, not elsewhere classified  R53.1 Weakness  Z47.1 Aftercare following joint replacement surgery   Date of Evaluation 25   Additional Pertinent History Gabo Maldonado has a past medical history of Accelerated hypertension, Adenomatous colon polyp, Ataxic gait, Blood transfusion reaction, Cervical muscle pain, Episodic lightheadedness, not positional, Erectile dysfunction, History of adenomatous polyp of colon, , History of blood transfusion, Hyperlipidemia, Hypertension, Other disorders of kidney and ureter in diseases classified elsewhere, Paroxysmal atrial fibrillation (HCC), Pituitary macroadenoma (HCC), Pneumonia due to COVID-19 virus, S/P laparoscopic cholecystectomy, Testicular pain, left, Tobacco dependence in remission, and Zoster.  he has a past surgical history that includes hernia repair (); Tonsillectomy (); Tooth Extraction (unsure); Abdomen surgery (unsure); Colonoscopy; Cholecystectomy; Dental surgery; Cardiac catheterization; Tonsillectomy and adenoidectomy; and hip surgery (N/A, 2025).     Allergies Allergies   Allergen Reactions    Dilaudid [Hydromorphone Hcl]

## 2025-05-06 ENCOUNTER — HOSPITAL ENCOUNTER (OUTPATIENT)
Dept: PHYSICAL THERAPY | Age: 85
Setting detail: THERAPIES SERIES
Discharge: HOME | End: 2025-05-06
Payer: MEDICARE

## 2025-05-06 PROCEDURE — 97110 THERAPEUTIC EXERCISES: CPT

## 2025-05-06 NOTE — PROGRESS NOTES
[facility]  PHYSICAL THERAPY  [] EVALUATION  [x] DAILY NOTE (LAND) [] DAILY NOTE (AQUATIC ) [] PROGRESS NOTE [] DISCHARGE NOTE    [x] OUTPATIENT REHABILITATION CENTER [facility]   [] [facility] CARE [place]    [] [facility]   [] [facility]    Date: 2025  Patient Name:  Gabo Maldonado  : 1940  MRN: [id number]  [facility]: [id number]    Referring Practitioner Isaac Estrada MD [id number]      Diagnosis  Diagnoses       Z47.1 (ICD-10-CM) - Aftercare following joint replacement surgery    Z96.642 (ICD-10-CM) - Presence of left artificial hip joint    S72.002D (ICD-10-CM) - Fracture of unspecified part of neck of left femur, subsequent encounter for closed fracture with routine healing           Treatment Diagnosis M25.552  Left Hip Pain  M25.652 Stiffness of left hip, not elsewhere classified  R53.1 Weakness  Z47.1 Aftercare following joint replacement surgery   Date of Evaluation 25   Additional Pertinent History Gabo Maldonado has a past medical history of Accelerated hypertension, Adenomatous colon polyp, Ataxic gait, Blood transfusion reaction, Cervical muscle pain, Episodic lightheadedness, not positional, Erectile dysfunction, History of adenomatous polyp of colon, , History of blood transfusion, Hyperlipidemia, Hypertension, Other disorders of kidney and ureter in diseases classified elsewhere, Paroxysmal atrial fibrillation (HCC), Pituitary macroadenoma (HCC), Pneumonia due to COVID-19 virus, S/P laparoscopic cholecystectomy, Testicular pain, left, Tobacco dependence in remission, and Zoster.  he has a past surgical history that includes hernia repair (); Tonsillectomy (); Tooth Extraction (unsure); Abdomen surgery (unsure); Colonoscopy; Cholecystectomy; Dental surgery; Cardiac catheterization; Tonsillectomy and adenoidectomy; and hip surgery (N/A, 2025).     Allergies Allergies   Allergen Reactions    Dilaudid [Hydromorphone Hcl]

## 2025-05-08 ENCOUNTER — HOSPITAL ENCOUNTER (OUTPATIENT)
Dept: PHYSICAL THERAPY | Age: 85
Setting detail: THERAPIES SERIES
Discharge: HOME | End: 2025-05-08
Payer: MEDICARE

## 2025-05-08 PROCEDURE — 97530 THERAPEUTIC ACTIVITIES: CPT

## 2025-05-08 PROCEDURE — 97110 THERAPEUTIC EXERCISES: CPT

## 2025-05-08 ASSESSMENT — HOOS JR
GOING UP OR DOWN STAIRS: MILD
HOOS JR RAW SCORE: 8
RISING FROM SITTING: MODERATE
HOOS JR RAW SCORE: 8
BENDING TO THE FLOOR TO PICK UP OBJECT: MODERATE
LYING IN BED (TURNING OVER, MAINTAINING HIP POSITION): MILD
HOOS JR TOTAL INTERVAL SCORE: 64.664
WALKING ON UNEVEN SURFACE: MODERATE

## 2025-05-08 NOTE — PROGRESS NOTES
limited by fatigue  []  Patient limited by pain   []  Patient limited by medical complications  []  Other:     Assessment: Patient was initially making good progress during his time in skilled PT services, however about 3-4 visits into outpatient rehab, pt began experiencing increased L hip, thigh and knee pain, at times radiating into L toes. Had f/u with surgeon on 4/30 and imaging completed revealed \"inflammation in low back causing compression on nerves\" per patient report. New order received to address lumbar radiculopathy. Lumbar spine assessed today, with patient continuing to demonstrating L hip stiffness and weakness, although improving, and neural tension. Patient will benefit from continued skilled PT services to increase strength, ROM and reduce pain and radicular symptoms to promote return to PLOF.      GOALS:  Patient Goal: to improve strength in leg     Short Term Goals: 4 weeks  Patient will report decrease in pain to 2-3/10 at most to allow ease of ADLs and household tasks. GOAL NOT MET: pain varies due to new onset of pinched nerve, can be 2/10 or as high as 6/10.  Continue Goal  2. Patient will improve L hip flexion AROM from 70 to 90 degrees to allow normalized gait pattern. GOAL NOT MET: see above.  Discontinue Goal  3. Patient will improve L knee strength to 5/5 to allow ease of walking and stair negoation. GOAL NOT MET: see above.  Continue Goal    Long Term Goals: 8 weeks   Patient will improve B hip strength to 4+/5 to allow ease of walking and stair negoation. GOAL NOT MET: see above.  Continue Goal  2. Patient will be independent with HEP in order to prevent re-injury and improve functional abilities. GOAL MET:  completes daily.  Continue Goal  3. Patient will improve HOOS Jr score from 9/24 to 2/24 to allow improved functional mobility and ease of recreational activities. GOAL NOT MET: 8/24.  Continue Goal  4. Patient will ambulate with LRAD without LOB with minimal to no deviation to

## 2025-05-13 ENCOUNTER — HOSPITAL ENCOUNTER (OUTPATIENT)
Dept: PHYSICAL THERAPY | Age: 85
Setting detail: THERAPIES SERIES
Discharge: HOME | End: 2025-05-13
Payer: MEDICARE

## 2025-05-13 PROCEDURE — 97110 THERAPEUTIC EXERCISES: CPT

## 2025-05-13 NOTE — PROGRESS NOTES
[facility]  PHYSICAL THERAPY  [] EVALUATION  [x] DAILY NOTE (LAND) [] DAILY NOTE (AQUATIC ) [] PROGRESS NOTE [] DISCHARGE NOTE    [x] OUTPATIENT REHABILITATION CENTER [facility]   [] [facility] CARE [place]    [] [facility]   [] [facility]    Date: 2025  Patient Name:  Gabo Maldonado  : 1940  MRN: [id number]  CSN: [id number]    Referring Practitioner Isaac Estrada MD [id number]      Diagnosis  Diagnoses       Z47.1 (ICD-10-CM) - Aftercare following joint replacement surgery    Z96.642 (ICD-10-CM) - Presence of left artificial hip joint    S72.002D (ICD-10-CM) - Fracture of unspecified part of neck of left femur, subsequent encounter for closed fracture with routine healing           Treatment Diagnosis M25.552  Left Hip Pain  M25.652 Stiffness of left hip, not elsewhere classified  R53.1 Weakness  Z47.1 Aftercare following joint replacement surgery   Date of Evaluation 25   Additional Pertinent History Gabo Maldonado has a past medical history of Accelerated hypertension, Adenomatous colon polyp, Ataxic gait, Blood transfusion reaction, Cervical muscle pain, Episodic lightheadedness, not positional, Erectile dysfunction, History of adenomatous polyp of colon, , History of blood transfusion, Hyperlipidemia, Hypertension, Other disorders of kidney and ureter in diseases classified elsewhere, Paroxysmal atrial fibrillation (HCC), Pituitary macroadenoma (HCC), Pneumonia due to COVID-19 virus, S/P laparoscopic cholecystectomy, Testicular pain, left, Tobacco dependence in remission, and Zoster.  he has a past surgical history that includes hernia repair (); Tonsillectomy (); Tooth Extraction (unsure); Abdomen surgery (unsure); Colonoscopy; Cholecystectomy; Dental surgery; Cardiac catheterization; Tonsillectomy and adenoidectomy; and hip surgery (N/A, 2025).     Allergies Allergies   Allergen Reactions    Dilaudid [Hydromorphone Hcl]

## 2025-05-15 ENCOUNTER — HOSPITAL ENCOUNTER (OUTPATIENT)
Dept: PHYSICAL THERAPY | Age: 85
Setting detail: THERAPIES SERIES
Discharge: HOME | End: 2025-05-15
Payer: MEDICARE

## 2025-05-15 PROCEDURE — 97110 THERAPEUTIC EXERCISES: CPT

## 2025-05-15 NOTE — PROGRESS NOTES
[facility]  PHYSICAL THERAPY  [] EVALUATION  [x] DAILY NOTE (LAND) [] DAILY NOTE (AQUATIC ) [] PROGRESS NOTE [] DISCHARGE NOTE    [x] OUTPATIENT REHABILITATION CENTER [facility]   [] [facility] CARE [place]    [] [facility]   [] [facility]    Date: 5/15/2025  Patient Name:  Gabo Maldonado  : 1940  MRN: [id number]  CSN: [id number]    Referring Practitioner Isaac Estrada MD [id number]      Diagnosis  Diagnoses       Z47.1 (ICD-10-CM) - Aftercare following joint replacement surgery    Z96.642 (ICD-10-CM) - Presence of left artificial hip joint    S72.002D (ICD-10-CM) - Fracture of unspecified part of neck of left femur, subsequent encounter for closed fracture with routine healing           Treatment Diagnosis M25.552  Left Hip Pain  M25.652 Stiffness of left hip, not elsewhere classified  R53.1 Weakness  Z47.1 Aftercare following joint replacement surgery   Date of Evaluation 25   Additional Pertinent History Gabo Maldonado has a past medical history of Accelerated hypertension, Adenomatous colon polyp, Ataxic gait, Blood transfusion reaction, Cervical muscle pain, Episodic lightheadedness, not positional, Erectile dysfunction, History of adenomatous polyp of colon, , History of blood transfusion, Hyperlipidemia, Hypertension, Other disorders of kidney and ureter in diseases classified elsewhere, Paroxysmal atrial fibrillation (HCC), Pituitary macroadenoma (HCC), Pneumonia due to COVID-19 virus, S/P laparoscopic cholecystectomy, Testicular pain, left, Tobacco dependence in remission, and Zoster.  he has a past surgical history that includes hernia repair (); Tonsillectomy (); Tooth Extraction (unsure); Abdomen surgery (unsure); Colonoscopy; Cholecystectomy; Dental surgery; Cardiac catheterization; Tonsillectomy and adenoidectomy; and hip surgery (N/A, 2025).     Allergies Allergies   Allergen Reactions    Dilaudid [Hydromorphone Hcl]

## 2025-05-20 ENCOUNTER — HOSPITAL ENCOUNTER (OUTPATIENT)
Dept: PHYSICAL THERAPY | Age: 85
Setting detail: THERAPIES SERIES
Discharge: HOME | End: 2025-05-20
Payer: MEDICARE

## 2025-05-20 PROCEDURE — 97110 THERAPEUTIC EXERCISES: CPT

## 2025-05-20 NOTE — PROGRESS NOTES
Gait with '  x Cued for L TKE     Specific Interventions Next Treatment: nu step, core stabilization, stretching-HS/gentle hip flexor stretch, L knee extension and flexion ROM, hip flexion ROM, hip and knee strengthening, gait-working toward straight cane, balance tasks.     Activity/Treatment Tolerance:  [x]  Patient tolerated treatment well  []  Patient limited by fatigue  []  Patient limited by pain   []  Patient limited by medical complications  []  Other:     Assessment: Continued with current exercise program. Trialed standing exercises followed with supine exercises. Pt voiced increase in pain, 5/10 while completing standing. Noted decrease in pain after completing supine stretches. Frequent cues to avoid moderated reliance with UE while in // bars and ambulating with RW, little to no carry-over noted.  Will continue to progress as tolerated.       GOALS:  Patient Goal: to improve strength in leg     Short Term Goals: 4 weeks  Patient will report decrease in pain to 2-3/10 at most to allow ease of ADLs and household tasks.   3. Patient will improve L knee strength to 5/5 to allow ease of walking and stair negoation.     Long Term Goals: 8 weeks   Patient will improve B hip strength to 4+/5 to allow ease of walking and stair negoation.   2. Patient will be independent with HEP in order to prevent re-injury and improve functional abilities.   3. Patient will improve HOOS Jr score from 9/24 to 2/24 to allow improved functional mobility and ease of recreational activities.   4. Patient will ambulate with LRAD without LOB with minimal to no deviation to allow return to prior level of function.     NEW: 5. Patient will improve B hip IR/ER to 30 degrees to improve ability to don socks and shoes.  NEW: 6. Patient will increase 90/90 hamstring length to 55 degrees on L to promote reduced pain and ease of functional mobility tasks.        Patient Education:   [x]  HEP/Education Completed: goals assessment, core

## 2025-05-22 ENCOUNTER — APPOINTMENT (OUTPATIENT)
Dept: PHYSICAL THERAPY | Age: 85
End: 2025-05-22
Payer: MEDICARE

## 2025-05-22 ENCOUNTER — HOSPITAL ENCOUNTER (OUTPATIENT)
Dept: PHYSICAL THERAPY | Age: 85
Setting detail: THERAPIES SERIES
Discharge: HOME | End: 2025-05-22
Payer: MEDICARE

## 2025-05-22 PROCEDURE — 97110 THERAPEUTIC EXERCISES: CPT

## 2025-05-22 NOTE — PROGRESS NOTES
Shortness Of Breath and Swelling    Hydrocodone-Acetaminophen Other (See Comments)     Visual halucinations.      Medications   Current Outpatient Medications:     aspirin 81 MG EC tablet, Take 1 tablet by mouth in the morning and at bedtime for 21 days, Disp: 42 tablet, Rfl: 0    lisinopril (PRINIVIL;ZESTRIL) 20 MG tablet, TAKE 1 TABLET TWICE A DAY, Disp: 180 tablet, Rfl: 3    ascorbic acid (VITAMIN C) 500 MG tablet, Take 1 tablet by mouth daily, Disp: 30 tablet, Rfl: 3    Cholecalciferol (VITAMIN D-3 PO), Take by mouth, Disp: , Rfl:     ZINC PO, Take by mouth, Disp: , Rfl:     Misc Natural Products (GLUCOSAMINE CHONDROITIN MSM PO), Take by mouth, Disp: , Rfl:     docusate sodium (COLACE) 100 MG capsule, Take 1 capsule by mouth every evening, Disp: , Rfl:     multivitamin (THERAGRAN) per tablet, Take 1 tablet by mouth daily, Disp: , Rfl:     Lysine 500 MG CAPS, Take 500 mg by mouth. 2 daily  , Disp: , Rfl:     Arginine 500 MG CAPS, Take 1,000 mg by mouth daily , Disp: , Rfl:     B Complex Vitamins (VITAMIN B COMPLEX PO), Take  by mouth. Twice weekly , Disp: , Rfl:       Functional Outcome Measure Used HOOS Jr   Functional Outcome Score 9/24 (4/8/25) 8/24 (5/8/25)      Insurance: Primary: Payor: [employer] /  /  / ,   Secondary:    Authorization Information PRE CERTIFICATION REQUIRED: Additional authorization not required.  INSURANCE THERAPY BENEFIT: Visits approved based on medical necessity.. .   AQUATIC THERAPY COVERED: Yes  MODALITIES COVERED:  Yes   Initial CPT Codes Requested 51894 - Therapeutic Exercise, 88768 - Manual Therapy , 71132 - Aquatic Therapeutic Exercise, 46448 - Neuromuscular Re-Education , and 12163 - Gait Training   Progress Note Counter 4/10 for progress note (Reporting Period: 5/13/25 to     )   Recertification Date 06/03/25   Physician Follow-Up Patient has been released from surgeon, does not have to follow up with that office now   Physician Orders    History of Present Illness Patient

## 2025-05-27 ENCOUNTER — HOSPITAL ENCOUNTER (OUTPATIENT)
Dept: PHYSICAL THERAPY | Age: 85
Setting detail: THERAPIES SERIES
Discharge: HOME | End: 2025-05-27
Payer: MEDICARE

## 2025-05-27 PROCEDURE — 97110 THERAPEUTIC EXERCISES: CPT

## 2025-05-27 NOTE — PROGRESS NOTES
[facility]  PHYSICAL THERAPY  [] EVALUATION  [x] DAILY NOTE (LAND) [] DAILY NOTE (AQUATIC ) [] PROGRESS NOTE [] DISCHARGE NOTE    [x] OUTPATIENT REHABILITATION CENTER [facility]   [] [facility] CARE [place]    [] [facility]   [] [facility]    Date: 2025  Patient Name:  Gabo Maldonado  : 1940  MRN: [id number]  CSN: [id number]    Referring Practitioner Isaac Estrada MD [id number]      Diagnosis  Diagnoses       Z47.1 (ICD-10-CM) - Aftercare following joint replacement surgery    Z96.642 (ICD-10-CM) - Presence of left artificial hip joint    S72.002D (ICD-10-CM) - Fracture of unspecified part of neck of left femur, subsequent encounter for closed fracture with routine healing           Treatment Diagnosis M25.552  Left Hip Pain  M25.652 Stiffness of left hip, not elsewhere classified  R53.1 Weakness  Z47.1 Aftercare following joint replacement surgery   Date of Evaluation 25   Additional Pertinent History Gabo Maldonado has a past medical history of Accelerated hypertension, Adenomatous colon polyp, Ataxic gait, Blood transfusion reaction, Cervical muscle pain, Episodic lightheadedness, not positional, Erectile dysfunction, History of adenomatous polyp of colon, , History of blood transfusion, Hyperlipidemia, Hypertension, Other disorders of kidney and ureter in diseases classified elsewhere, Paroxysmal atrial fibrillation (HCC), Pituitary macroadenoma (HCC), Pneumonia due to COVID-19 virus, S/P laparoscopic cholecystectomy, Testicular pain, left, Tobacco dependence in remission, and Zoster.  he has a past surgical history that includes hernia repair (); Tonsillectomy (); Tooth Extraction (unsure); Abdomen surgery (unsure); Colonoscopy; Cholecystectomy; Dental surgery; Cardiac catheterization; Tonsillectomy and adenoidectomy; and hip surgery (N/A, 2025).     Allergies Allergies   Allergen Reactions    Dilaudid [Hydromorphone Hcl]

## 2025-05-29 ENCOUNTER — HOSPITAL ENCOUNTER (OUTPATIENT)
Dept: PHYSICAL THERAPY | Age: 85
Setting detail: THERAPIES SERIES
Discharge: HOME | End: 2025-05-29
Payer: MEDICARE

## 2025-05-29 PROCEDURE — 97110 THERAPEUTIC EXERCISES: CPT

## 2025-05-29 NOTE — PROGRESS NOTES
[facility]  PHYSICAL THERAPY  [] EVALUATION  [x] DAILY NOTE (LAND) [] DAILY NOTE (AQUATIC ) [] PROGRESS NOTE [] DISCHARGE NOTE    [x] OUTPATIENT REHABILITATION CENTER [facility]   [] [facility] CARE [place]    [] [facility]   [] [facility]    Date: 2025  Patient Name:  Gabo Maldonado  : 1940  MRN: [id number]  CSN: [id number]    Referring Practitioner Isaac Estrada MD [id number]      Diagnosis  Diagnoses       Z47.1 (ICD-10-CM) - Aftercare following joint replacement surgery    Z96.642 (ICD-10-CM) - Presence of left artificial hip joint    S72.002D (ICD-10-CM) - Fracture of unspecified part of neck of left femur, subsequent encounter for closed fracture with routine healing           Treatment Diagnosis M25.552  Left Hip Pain  M25.652 Stiffness of left hip, not elsewhere classified  R53.1 Weakness  Z47.1 Aftercare following joint replacement surgery   Date of Evaluation 25   Additional Pertinent History Gabo Maldonado has a past medical history of Accelerated hypertension, Adenomatous colon polyp, Ataxic gait, Blood transfusion reaction, Cervical muscle pain, Episodic lightheadedness, not positional, Erectile dysfunction, History of adenomatous polyp of colon, , History of blood transfusion, Hyperlipidemia, Hypertension, Other disorders of kidney and ureter in diseases classified elsewhere, Paroxysmal atrial fibrillation (HCC), Pituitary macroadenoma (HCC), Pneumonia due to COVID-19 virus, S/P laparoscopic cholecystectomy, Testicular pain, left, Tobacco dependence in remission, and Zoster.  he has a past surgical history that includes hernia repair (); Tonsillectomy (); Tooth Extraction (unsure); Abdomen surgery (unsure); Colonoscopy; Cholecystectomy; Dental surgery; Cardiac catheterization; Tonsillectomy and adenoidectomy; and hip surgery (N/A, 2025).     Allergies Allergies   Allergen Reactions    Dilaudid [Hydromorphone Hcl]

## 2025-05-30 ENCOUNTER — TRANSCRIBE ORDERS (OUTPATIENT)
Dept: ADMINISTRATIVE | Age: 85
End: 2025-05-30

## 2025-05-30 DIAGNOSIS — M54.16 LUMBAR RADICULOPATHY: Primary | ICD-10-CM

## 2025-06-03 ENCOUNTER — HOSPITAL ENCOUNTER (OUTPATIENT)
Dept: PHYSICAL THERAPY | Age: 85
Setting detail: THERAPIES SERIES
Discharge: HOME | End: 2025-06-03
Payer: MEDICARE

## 2025-06-03 PROCEDURE — 97110 THERAPEUTIC EXERCISES: CPT

## 2025-06-03 NOTE — PROGRESS NOTES
[facility]  PHYSICAL THERAPY  [] EVALUATION  [x] DAILY NOTE (LAND) [] DAILY NOTE (AQUATIC ) [] PROGRESS NOTE [] DISCHARGE NOTE    [x] OUTPATIENT REHABILITATION CENTER [facility]   [] [facility] CARE [place]    [] [facility]   [] [facility]    Date: 6/3/2025  Patient Name:  Gabo Maldonado  : 1940  MRN: [id number]  CSN: [id number]    Referring Practitioner Isaac Estrada MD [id number]      Diagnosis  Diagnoses       Z47.1 (ICD-10-CM) - Aftercare following joint replacement surgery    Z96.642 (ICD-10-CM) - Presence of left artificial hip joint    S72.002D (ICD-10-CM) - Fracture of unspecified part of neck of left femur, subsequent encounter for closed fracture with routine healing           Treatment Diagnosis M25.552  Left Hip Pain  M25.652 Stiffness of left hip, not elsewhere classified  R53.1 Weakness  Z47.1 Aftercare following joint replacement surgery   Date of Evaluation 25   Additional Pertinent History Gabo Maldonado has a past medical history of Accelerated hypertension, Adenomatous colon polyp, Ataxic gait, Blood transfusion reaction, Cervical muscle pain, Episodic lightheadedness, not positional, Erectile dysfunction, History of adenomatous polyp of colon, , History of blood transfusion, Hyperlipidemia, Hypertension, Other disorders of kidney and ureter in diseases classified elsewhere, Paroxysmal atrial fibrillation (HCC), Pituitary macroadenoma (HCC), Pneumonia due to COVID-19 virus, S/P laparoscopic cholecystectomy, Testicular pain, left, Tobacco dependence in remission, and Zoster.  he has a past surgical history that includes hernia repair (); Tonsillectomy (); Tooth Extraction (unsure); Abdomen surgery (unsure); Colonoscopy; Cholecystectomy; Dental surgery; Cardiac catheterization; Tonsillectomy and adenoidectomy; and hip surgery (N/A, 2025).     Allergies Allergies   Allergen Reactions    Dilaudid [Hydromorphone Hcl]

## 2025-06-04 ENCOUNTER — OFFICE VISIT (OUTPATIENT)
Dept: FAMILY MEDICINE CLINIC | Age: 85
End: 2025-06-04

## 2025-06-04 VITALS
HEART RATE: 74 BPM | BODY MASS INDEX: 39.77 KG/M2 | HEIGHT: 68 IN | WEIGHT: 262.4 LBS | DIASTOLIC BLOOD PRESSURE: 72 MMHG | RESPIRATION RATE: 18 BRPM | SYSTOLIC BLOOD PRESSURE: 128 MMHG

## 2025-06-04 DIAGNOSIS — E22.0 ACROMEGALY (HCC): ICD-10-CM

## 2025-06-04 DIAGNOSIS — Z00.00 MEDICARE ANNUAL WELLNESS VISIT, SUBSEQUENT: Primary | ICD-10-CM

## 2025-06-04 DIAGNOSIS — I25.10 CORONARY ARTERY DISEASE DUE TO LIPID RICH PLAQUE: ICD-10-CM

## 2025-06-04 DIAGNOSIS — E66.813 OBESITY, CLASS III, BMI 40-49.9 (MORBID OBESITY) (HCC): ICD-10-CM

## 2025-06-04 DIAGNOSIS — I10 ESSENTIAL HYPERTENSION: ICD-10-CM

## 2025-06-04 DIAGNOSIS — E78.2 MIXED HYPERLIPIDEMIA: ICD-10-CM

## 2025-06-04 DIAGNOSIS — E66.01 MORBIDLY OBESE (HCC): ICD-10-CM

## 2025-06-04 DIAGNOSIS — I25.83 CORONARY ARTERY DISEASE DUE TO LIPID RICH PLAQUE: ICD-10-CM

## 2025-06-04 DIAGNOSIS — R73.01 IFG (IMPAIRED FASTING GLUCOSE): ICD-10-CM

## 2025-06-04 RX ORDER — MELOXICAM 15 MG/1
TABLET ORAL
COMMUNITY
Start: 2025-04-30

## 2025-06-04 ASSESSMENT — ENCOUNTER SYMPTOMS
BACK PAIN: 1
GASTROINTESTINAL NEGATIVE: 1
RESPIRATORY NEGATIVE: 1

## 2025-06-04 ASSESSMENT — PATIENT HEALTH QUESTIONNAIRE - PHQ9
1. LITTLE INTEREST OR PLEASURE IN DOING THINGS: NOT AT ALL
SUM OF ALL RESPONSES TO PHQ QUESTIONS 1-9: 0
2. FEELING DOWN, DEPRESSED OR HOPELESS: NOT AT ALL
SUM OF ALL RESPONSES TO PHQ QUESTIONS 1-9: 0

## 2025-06-05 ENCOUNTER — HOSPITAL ENCOUNTER (OUTPATIENT)
Dept: PHYSICAL THERAPY | Age: 85
Setting detail: THERAPIES SERIES
Discharge: HOME | End: 2025-06-05
Payer: MEDICARE

## 2025-06-05 PROCEDURE — 97110 THERAPEUTIC EXERCISES: CPT

## 2025-06-05 NOTE — PROGRESS NOTES
2/1/25. Patient denies any complication with surgery. Patient reports that he fell in late Jan and had a hip fracture resulting in L VALERIA. Patient reports that he had home health PT a couple sessions. Patient has been working on exercises at home from hospital. Patient reports L hip and knee pain recently. Patient is using a walker currently. Patient did not use AD prior to surgery. Patient reports difficulty with sit to stand. Hip precautions initially post op-no hip flexion > 90 degrees, crossing legs/rotation, no hip abduction. Patient reports that he no longer has these precautions with being cleared from surgeon. Patient does have a cane available if needed.      SUBJECTIVE: States recliner chair is still helpful.  Rates pain 6/10 today.      Objective:      TREATMENT   Precautions: L VALERIA 2/1/25-lateral approach -has been released from precautions    Pain: 3/10 L posterior hip/groin.     \"X” in shaded column indicates activity completed today    “*\" next to exercise/intervention indicates progression   Modalities Parameters/  Location  Notes                     Manual Therapy Time/Technique  Notes                     Exercise/Intervention   Notes   NuStep seat 11 arms 11 6 min L4 x           TrA + SLR 15 x      Heel slides 15 x      Bridge  15 x   With ab brace   HS stretch with towel 3x10 sec      TrA brace 15*x5s      TrA + march 15*      TrA + hip ADD iso 10x5 sec      SKTC with towel - B 3x15 sec      BKFO - unilat and bilat 15   Bilateral only   L hip flexor stretch off edge of mat 3x15s      Piriformis stretch into IR with towel 3x15 sec      Adductor stretch 3x15 sec   Good stretch   Sciatic nerve glide- seated* 10x2s             Seated:       TrA + LAQ 15x 5 sec      HS stretch 3 x 20 sec      Sit<>stands from standard chair with arm rest 10   Cued for equal wt into both legs          Standing:       Step stretches: HS and hip flexor 4x20s each  x Cued to avoid pushing into pain: verbalized understanding

## 2025-06-10 ENCOUNTER — HOSPITAL ENCOUNTER (OUTPATIENT)
Dept: PHYSICAL THERAPY | Age: 85
Setting detail: THERAPIES SERIES
Discharge: HOME | End: 2025-06-10
Payer: MEDICARE

## 2025-06-10 PROCEDURE — 97530 THERAPEUTIC ACTIVITIES: CPT

## 2025-06-10 PROCEDURE — 97110 THERAPEUTIC EXERCISES: CPT

## 2025-06-10 ASSESSMENT — HOOS JR
LYING IN BED (TURNING OVER, MAINTAINING HIP POSITION): MILD
GOING UP OR DOWN STAIRS: MODERATE
HOOS JR TOTAL INTERVAL SCORE: 70.426
HOOS JR RAW SCORE: 6
WALKING ON UNEVEN SURFACE: MODERATE
HOOS JR RAW SCORE: 6
BENDING TO THE FLOOR TO PICK UP OBJECT: MILD

## 2025-06-10 NOTE — PROGRESS NOTES
deviation to allow return to prior level of function. GOAL NOT MET: RW, antalgic.  Continue Goal    NEW: 5. Patient will improve B hip IR/ER to 30 degrees to improve ability to don socks and shoes. GOAL NOT MET: L ER 23 deg, IR 23 deg, R ER 31 deg, IR 30 deg .  Revise Goal: Patient will improve L hip IR/ER to 30 degrees to improve ability to don socks and shoes.  NEW: 6. Patient will increase 90/90 hamstring length to 55 degrees on L to promote reduced pain and ease of functional mobility tasks. GOAL NOT MET: 50 deg.  Continue Goal      Patient Education:   [x]  HEP/Education Completed: continue HEP, hold for MRI and physician f/u  [facility]:  [email or web address]  []  No new Education completed  []  Reviewed Prior HEP      [x]  Patient verbalized and/or demonstrated understanding of education provided.  []  Patient unable to verbalize and/or demonstrate understanding of education provided.  Will continue education.  []  Barriers to learning:     PLAN:  Treatment Recommendations: Strengthening, Range of Motion, Balance Training, Transfer Training, Gait Training, Stair Training, Neuromuscular Re-education, Manual Therapy - Soft Tissue Mobilization, Manual Therapy - Joint Manipulation, Pain Management, Home Exercise Program, Patient Education, Integrative Dry Needling, Aquatics, and Modalities    []  Plan of care initiated.  Plan to see patient 2 times per week for 8 weeks to address the treatment planned outlined above.  []  Continue with current plan of care  []  Modify plan of care as follows: 2x/wk for 6 additional wks   [x]  Hold pending MRI and physician visit  []  Discharge    Time In 1300   Time Out 1347   Timed Code Minutes: 47 min    Total Treatment Time: 47 min       Electronically Signed by: Olman Eckert, PT

## 2025-06-12 ENCOUNTER — APPOINTMENT (OUTPATIENT)
Dept: PHYSICAL THERAPY | Age: 85
End: 2025-06-12
Payer: MEDICARE

## 2025-06-19 ENCOUNTER — HOSPITAL ENCOUNTER (OUTPATIENT)
Dept: MRI IMAGING | Age: 85
Discharge: HOME | End: 2025-06-19
Attending: STUDENT IN AN ORGANIZED HEALTH CARE EDUCATION/TRAINING PROGRAM
Payer: MEDICARE

## 2025-06-19 DIAGNOSIS — M54.16 LUMBAR RADICULOPATHY: ICD-10-CM

## 2025-06-19 PROCEDURE — 72148 MRI LUMBAR SPINE W/O DYE: CPT

## 2025-07-10 ENCOUNTER — HOSPITAL ENCOUNTER (OUTPATIENT)
Dept: PHYSICAL THERAPY | Age: 85
Setting detail: THERAPIES SERIES
Discharge: HOME | End: 2025-07-10
Payer: MEDICARE

## 2025-07-10 PROCEDURE — 97110 THERAPEUTIC EXERCISES: CPT

## 2025-07-10 PROCEDURE — 97012 MECHANICAL TRACTION THERAPY: CPT

## 2025-07-10 NOTE — PROGRESS NOTES
[facility]  PHYSICAL THERAPY  [] EVALUATION  [x] DAILY NOTE (LAND) [] DAILY NOTE (AQUATIC ) [] PROGRESS NOTE [] DISCHARGE NOTE    [x] OUTPATIENT REHABILITATION CENTER [facility]   [] [facility] CARE [place]    [] [facility]   [] [facility]    Date: 7/10/2025  Patient Name:  Gabo Maldonado  : 1940  MRN: [id number]  CSN: [id number]    Referring Practitioner Isaac Estrada MD [id number]      Diagnosis  Diagnoses       Z47.1 (ICD-10-CM) - Aftercare following joint replacement surgery    Z96.642 (ICD-10-CM) - Presence of left artificial hip joint    S72.002D (ICD-10-CM) - Fracture of unspecified part of neck of left femur, subsequent encounter for closed fracture with routine healing           Treatment Diagnosis M25.552  Left Hip Pain  M25.652 Stiffness of left hip, not elsewhere classified  R53.1 Weakness  Z47.1 Aftercare following joint replacement surgery   Date of Evaluation 25   Additional Pertinent History Gabo Maldonado has a past medical history of Accelerated hypertension, Adenomatous colon polyp, Ataxic gait, Blood transfusion reaction, Cervical muscle pain, Episodic lightheadedness, not positional, Erectile dysfunction, History of adenomatous polyp of colon, , History of blood transfusion, Hyperlipidemia, Hypertension, Other disorders of kidney and ureter in diseases classified elsewhere, Paroxysmal atrial fibrillation (HCC), Pituitary macroadenoma (HCC), Pneumonia due to COVID-19 virus, S/P laparoscopic cholecystectomy, Testicular pain, left, Tobacco dependence in remission, and Zoster.  he has a past surgical history that includes hernia repair (); Tonsillectomy (); Tooth Extraction (unsure); Abdomen surgery (unsure); Colonoscopy; Cholecystectomy; Dental surgery; Cardiac catheterization; Tonsillectomy and adenoidectomy; and hip surgery (N/A, 2025).     Allergies Allergies   Allergen Reactions    Dilaudid [Hydromorphone Hcl]

## 2025-07-18 ENCOUNTER — HOSPITAL ENCOUNTER (OUTPATIENT)
Dept: PHYSICAL THERAPY | Age: 85
Setting detail: THERAPIES SERIES
Discharge: HOME | End: 2025-07-18
Payer: MEDICARE

## 2025-07-18 PROCEDURE — 97530 THERAPEUTIC ACTIVITIES: CPT

## 2025-07-18 PROCEDURE — 97012 MECHANICAL TRACTION THERAPY: CPT

## 2025-07-18 NOTE — PROGRESS NOTES
[facility]  PHYSICAL THERAPY  [] EVALUATION  [x] DAILY NOTE (LAND) [] DAILY NOTE (AQUATIC ) [] PROGRESS NOTE [] DISCHARGE NOTE    [x] OUTPATIENT REHABILITATION CENTER [facility]   [] [facility] CARE [place]    [] [facility]   [] [facility]    Date: 2025  Patient Name:  Gabo Maldonado  : 1940  MRN: [id number]  CSN: [id number]    Referring Practitioner Isaac Estrada MD [id number]      Diagnosis  Diagnoses       Z47.1 (ICD-10-CM) - Aftercare following joint replacement surgery    Z96.642 (ICD-10-CM) - Presence of left artificial hip joint    S72.002D (ICD-10-CM) - Fracture of unspecified part of neck of left femur, subsequent encounter for closed fracture with routine healing           Treatment Diagnosis M25.552  Left Hip Pain  M25.652 Stiffness of left hip, not elsewhere classified  R53.1 Weakness  Z47.1 Aftercare following joint replacement surgery   Date of Evaluation 25   Additional Pertinent History Gabo Maldonado has a past medical history of Accelerated hypertension, Adenomatous colon polyp, Ataxic gait, Blood transfusion reaction, Cervical muscle pain, Episodic lightheadedness, not positional, Erectile dysfunction, History of adenomatous polyp of colon, , History of blood transfusion, Hyperlipidemia, Hypertension, Other disorders of kidney and ureter in diseases classified elsewhere, Paroxysmal atrial fibrillation (HCC), Pituitary macroadenoma (HCC), Pneumonia due to COVID-19 virus, S/P laparoscopic cholecystectomy, Testicular pain, left, Tobacco dependence in remission, and Zoster.  he has a past surgical history that includes hernia repair (); Tonsillectomy (); Tooth Extraction (unsure); Abdomen surgery (unsure); Colonoscopy; Cholecystectomy; Dental surgery; Cardiac catheterization; Tonsillectomy and adenoidectomy; and hip surgery (N/A, 2025).     Allergies Allergies   Allergen Reactions    Dilaudid [Hydromorphone Hcl]

## 2025-07-22 ENCOUNTER — HOSPITAL ENCOUNTER (OUTPATIENT)
Dept: PHYSICAL THERAPY | Age: 85
Setting detail: THERAPIES SERIES
Discharge: HOME | End: 2025-07-22
Payer: MEDICARE

## 2025-07-22 PROCEDURE — 97012 MECHANICAL TRACTION THERAPY: CPT

## 2025-07-22 PROCEDURE — 97110 THERAPEUTIC EXERCISES: CPT

## 2025-07-22 NOTE — PROGRESS NOTES
[facility]  PHYSICAL THERAPY  [] EVALUATION  [x] DAILY NOTE (LAND) [] DAILY NOTE (AQUATIC ) [] PROGRESS NOTE [] DISCHARGE NOTE    [x] OUTPATIENT REHABILITATION CENTER [facility]   [] [facility] CARE [place]    [] [facility]   [] [facility]    Date: 2025  Patient Name:  Gabo Maldonado  : 1940  MRN: [id number]  CSN: [id number]    Referring Practitioner Isaac Estrada MD [id number]      Diagnosis  Diagnoses       Z47.1 (ICD-10-CM) - Aftercare following joint replacement surgery    Z96.642 (ICD-10-CM) - Presence of left artificial hip joint    S72.002D (ICD-10-CM) - Fracture of unspecified part of neck of left femur, subsequent encounter for closed fracture with routine healing           Treatment Diagnosis M25.552  Left Hip Pain  M25.652 Stiffness of left hip, not elsewhere classified  R53.1 Weakness  Z47.1 Aftercare following joint replacement surgery   Date of Evaluation 25   Additional Pertinent History Gabo Maldonado has a past medical history of Accelerated hypertension, Adenomatous colon polyp, Ataxic gait, Blood transfusion reaction, Cervical muscle pain, Episodic lightheadedness, not positional, Erectile dysfunction, History of adenomatous polyp of colon, , History of blood transfusion, Hyperlipidemia, Hypertension, Other disorders of kidney and ureter in diseases classified elsewhere, Paroxysmal atrial fibrillation (HCC), Pituitary macroadenoma (HCC), Pneumonia due to COVID-19 virus, S/P laparoscopic cholecystectomy, Testicular pain, left, Tobacco dependence in remission, and Zoster.  he has a past surgical history that includes hernia repair (); Tonsillectomy (); Tooth Extraction (unsure); Abdomen surgery (unsure); Colonoscopy; Cholecystectomy; Dental surgery; Cardiac catheterization; Tonsillectomy and adenoidectomy; and hip surgery (N/A, 2025).     Allergies Allergies   Allergen Reactions    Dilaudid [Hydromorphone Hcl]

## 2025-07-24 ENCOUNTER — HOSPITAL ENCOUNTER (OUTPATIENT)
Dept: PHYSICAL THERAPY | Age: 85
Setting detail: THERAPIES SERIES
Discharge: HOME | End: 2025-07-24
Payer: MEDICARE

## 2025-07-24 PROCEDURE — 97110 THERAPEUTIC EXERCISES: CPT

## 2025-07-24 PROCEDURE — 97012 MECHANICAL TRACTION THERAPY: CPT

## 2025-07-24 NOTE — PROGRESS NOTES
[facility]  PHYSICAL THERAPY  [] EVALUATION  [x] DAILY NOTE (LAND) [] DAILY NOTE (AQUATIC ) [] PROGRESS NOTE [] DISCHARGE NOTE    [x] OUTPATIENT REHABILITATION CENTER [facility]   [] [facility] CARE [place]    [] [facility]   [] [facility]    Date: 2025  Patient Name:  Gabo Maldonado  : 1940  MRN: [id number]  CSN: [id number]    Referring Practitioner Isaac Estrada MD [id number]      Diagnosis  Diagnoses       Z47.1 (ICD-10-CM) - Aftercare following joint replacement surgery    Z96.642 (ICD-10-CM) - Presence of left artificial hip joint    S72.002D (ICD-10-CM) - Fracture of unspecified part of neck of left femur, subsequent encounter for closed fracture with routine healing           Treatment Diagnosis M25.552  Left Hip Pain  M25.652 Stiffness of left hip, not elsewhere classified  R53.1 Weakness  Z47.1 Aftercare following joint replacement surgery   Date of Evaluation 25   Additional Pertinent History Gabo Maldonado has a past medical history of Accelerated hypertension, Adenomatous colon polyp, Ataxic gait, Blood transfusion reaction, Cervical muscle pain, Episodic lightheadedness, not positional, Erectile dysfunction, History of adenomatous polyp of colon, , History of blood transfusion, Hyperlipidemia, Hypertension, Other disorders of kidney and ureter in diseases classified elsewhere, Paroxysmal atrial fibrillation (HCC), Pituitary macroadenoma (HCC), Pneumonia due to COVID-19 virus, S/P laparoscopic cholecystectomy, Testicular pain, left, Tobacco dependence in remission, and Zoster.  he has a past surgical history that includes hernia repair (); Tonsillectomy (); Tooth Extraction (unsure); Abdomen surgery (unsure); Colonoscopy; Cholecystectomy; Dental surgery; Cardiac catheterization; Tonsillectomy and adenoidectomy; and hip surgery (N/A, 2025).     Allergies Allergies   Allergen Reactions    Dilaudid [Hydromorphone Hcl]

## 2025-07-29 ENCOUNTER — HOSPITAL ENCOUNTER (OUTPATIENT)
Dept: PHYSICAL THERAPY | Age: 85
Setting detail: THERAPIES SERIES
Discharge: HOME | End: 2025-07-29
Payer: MEDICARE

## 2025-07-29 PROCEDURE — 97012 MECHANICAL TRACTION THERAPY: CPT

## 2025-07-29 PROCEDURE — 97110 THERAPEUTIC EXERCISES: CPT

## 2025-07-29 NOTE — PROGRESS NOTES
[facility]  PHYSICAL THERAPY  [] EVALUATION  [x] DAILY NOTE (LAND) [] DAILY NOTE (AQUATIC ) [] PROGRESS NOTE [] DISCHARGE NOTE    [x] OUTPATIENT REHABILITATION CENTER [facility]   [] [facility] CARE [place]    [] [facility]   [] [facility]    Date: 2025  Patient Name:  Gabo Maldonado  : 1940  MRN: [id number]  CSN: [id number]    Referring Practitioner Isaac Estrada MD [id number]      Diagnosis  Diagnoses       Z47.1 (ICD-10-CM) - Aftercare following joint replacement surgery    Z96.642 (ICD-10-CM) - Presence of left artificial hip joint    S72.002D (ICD-10-CM) - Fracture of unspecified part of neck of left femur, subsequent encounter for closed fracture with routine healing           Treatment Diagnosis M25.552  Left Hip Pain  M25.652 Stiffness of left hip, not elsewhere classified  R53.1 Weakness  Z47.1 Aftercare following joint replacement surgery   Date of Evaluation 25   Additional Pertinent History Gabo Maldonado has a past medical history of Accelerated hypertension, Adenomatous colon polyp, Ataxic gait, Blood transfusion reaction, Cervical muscle pain, Episodic lightheadedness, not positional, Erectile dysfunction, History of adenomatous polyp of colon, , History of blood transfusion, Hyperlipidemia, Hypertension, Other disorders of kidney and ureter in diseases classified elsewhere, Paroxysmal atrial fibrillation (HCC), Pituitary macroadenoma (HCC), Pneumonia due to COVID-19 virus, S/P laparoscopic cholecystectomy, Testicular pain, left, Tobacco dependence in remission, and Zoster.  he has a past surgical history that includes hernia repair (); Tonsillectomy (); Tooth Extraction (unsure); Abdomen surgery (unsure); Colonoscopy; Cholecystectomy; Dental surgery; Cardiac catheterization; Tonsillectomy and adenoidectomy; and hip surgery (N/A, 2025).     Allergies Allergies   Allergen Reactions    Dilaudid [Hydromorphone Hcl]

## 2025-07-31 ENCOUNTER — HOSPITAL ENCOUNTER (OUTPATIENT)
Dept: PHYSICAL THERAPY | Age: 85
Setting detail: THERAPIES SERIES
Discharge: HOME | End: 2025-07-31
Payer: MEDICARE

## 2025-07-31 PROCEDURE — 97012 MECHANICAL TRACTION THERAPY: CPT

## 2025-07-31 PROCEDURE — 97110 THERAPEUTIC EXERCISES: CPT

## 2025-07-31 NOTE — PROGRESS NOTES
that he had L VALERIA on 2/1/25. Patient denies any complication with surgery. Patient reports that he fell in late Jan and had a hip fracture resulting in L VALERIA. Patient reports that he had home health PT a couple sessions. Patient has been working on exercises at home from hospital. Patient reports L hip and knee pain recently. Patient is using a walker currently. Patient did not use AD prior to surgery. Patient reports difficulty with sit to stand. Hip precautions initially post op-no hip flexion > 90 degrees, crossing legs/rotation, no hip abduction. Patient reports that he no longer has these precautions with being cleared from surgeon. Patient does have a cane available if needed.      SUBJECTIVE: Pt reports no pain while ambulating. States injection went well.     Objective:      TREATMENT   Precautions: L VALERIA 2/1/25-lateral approach -has been released from precautions    Pain: 3/10 L posterior hip/groin.     \"X” in shaded column indicates activity completed today    “*\" next to exercise/intervention indicates progression   Modalities Parameters/  Location  Notes                     Manual Therapy Time/Technique  Notes   Lumbar mechanical traction  12* min 90# today* x Pt wt: 255#, pull: 25% pt body wt - no MHP today               Exercise/Intervention   Notes   NuStep seat 11 arms 11 6 min L2*  Reduced resistance as higher level caused pain          TrA + SLR 15 x      Heel slides 15 x      Bridge  15 x   With ab brace   HS stretch with towel 3x10 sec      TrA brace 15x5s  x           TrA + march 15  x    TrA + hip ADD iso 10x5 sec      SKTC with towel - B 3x15 sec      BKFO - unilat and bilat 15  x Bilateral only   L hip flexor stretch off edge of mat 3x15s      Piriformis stretch into IR with towel 3x15 sec  x    Adductor stretch 3x15 sec   Good stretch   Sciatic nerve glide- seated 10x2s             Seated:       TrA + LAQ 15x 5 sec      HS stretch 3 x 20 sec      Sit<>stands from standard chair with arm rest

## 2025-08-05 ENCOUNTER — HOSPITAL ENCOUNTER (OUTPATIENT)
Dept: PHYSICAL THERAPY | Age: 85
Setting detail: THERAPIES SERIES
Discharge: HOME | End: 2025-08-05
Payer: MEDICARE

## 2025-08-05 PROCEDURE — 97110 THERAPEUTIC EXERCISES: CPT

## 2025-08-05 PROCEDURE — 97012 MECHANICAL TRACTION THERAPY: CPT

## 2025-08-05 ASSESSMENT — HOOS JR
HOOS JR RAW SCORE: 4
HOOS JR RAW SCORE: 4
HOOS JR TOTAL INTERVAL SCORE: 76.776
BENDING TO THE FLOOR TO PICK UP OBJECT: MILD
WALKING ON UNEVEN SURFACE: MODERATE
GOING UP OR DOWN STAIRS: MILD

## 2025-08-06 ENCOUNTER — TELEPHONE (OUTPATIENT)
Dept: FAMILY MEDICINE CLINIC | Age: 85
End: 2025-08-06

## 2025-08-07 ENCOUNTER — APPOINTMENT (OUTPATIENT)
Dept: PHYSICAL THERAPY | Age: 85
End: 2025-08-07
Payer: MEDICARE

## 2025-08-12 ENCOUNTER — HOSPITAL ENCOUNTER (OUTPATIENT)
Dept: PHYSICAL THERAPY | Age: 85
Setting detail: THERAPIES SERIES
Discharge: HOME | End: 2025-08-12
Payer: MEDICARE

## 2025-08-12 PROCEDURE — 97110 THERAPEUTIC EXERCISES: CPT

## 2025-08-12 PROCEDURE — 97012 MECHANICAL TRACTION THERAPY: CPT

## 2025-08-19 ENCOUNTER — HOSPITAL ENCOUNTER (OUTPATIENT)
Dept: PHYSICAL THERAPY | Age: 85
Setting detail: THERAPIES SERIES
Discharge: HOME | End: 2025-08-19
Payer: MEDICARE

## 2025-08-19 PROCEDURE — 97110 THERAPEUTIC EXERCISES: CPT

## 2025-08-27 ENCOUNTER — HOSPITAL ENCOUNTER (OUTPATIENT)
Dept: PHYSICAL THERAPY | Age: 85
Setting detail: THERAPIES SERIES
Discharge: HOME | End: 2025-08-27
Payer: MEDICARE

## 2025-08-27 PROCEDURE — 97530 THERAPEUTIC ACTIVITIES: CPT

## 2025-09-02 ENCOUNTER — HOSPITAL ENCOUNTER (OUTPATIENT)
Dept: PHYSICAL THERAPY | Age: 85
Setting detail: THERAPIES SERIES
Discharge: HOME | End: 2025-09-02
Payer: MEDICARE

## 2025-09-02 PROCEDURE — 97110 THERAPEUTIC EXERCISES: CPT

## (undated) DEVICE — BLADE ES L6IN ELASTOMERIC COAT EXT DURABLE BEND UPTO 90DEG

## (undated) DEVICE — SUTURE ETHIBOND EXCEL SZ 5 L30IN NONABSORBABLE GRN L48MM V-40 MB46G

## (undated) DEVICE — PREP IM ENCHANCED TOTAL HIP BONE                                    PREPARATION KIT: Brand: PREP-IM

## (undated) DEVICE — SUTURE STRATAFIX SZ 3-0 30CM NONABSORB UD 26MM FS 3/8 SXMP2B412

## (undated) DEVICE — 450 ML BOTTLE OF 0.05% CHLORHEXIDINE GLUCONATE IN 99.95% STERILE WATER FOR IRRIGATION, USP AND APPLICATOR.: Brand: IRRISEPT ANTIMICROBIAL WOUND LAVAGE

## (undated) DEVICE — SUTURE ABSORBABLE MONOFILAMENT 2-0 CT-1 24 CM 36 MM VIO PDS+

## (undated) DEVICE — Device

## (undated) DEVICE — ADHESIVE SKIN CLOSURE WND 8.661X1.5 IN 22 CM LIQUIBAND SECUR

## (undated) DEVICE — COVER,TABLE,44X90,STERILE: Brand: MEDLINE

## (undated) DEVICE — DUAL CUT SAGITTAL BLADE

## (undated) DEVICE — PAD HIP IMMOB

## (undated) DEVICE — GLOVE PROTCT PF XL ANTIMICROBIAL A4 CUT RESIST SCEPTER LTX

## (undated) DEVICE — GLOVE SURG SZ 9 THK91MIL LTX FREE SYN POLYISOPRENE ANTI

## (undated) DEVICE — SUTURE ABSORBABLE MONOFILAMENT 1 OS-8 36 CM 40 MM VIO PDS +

## (undated) DEVICE — NEEDLE SPNL L3.5IN PNK HUB S STL REG WALL FIT STYL W/ QNCKE

## (undated) DEVICE — HOOD WITH PEEL AWAY FACE SHIELD: Brand: T7PLUS